# Patient Record
Sex: FEMALE | Race: WHITE | Employment: UNEMPLOYED | URBAN - METROPOLITAN AREA
[De-identification: names, ages, dates, MRNs, and addresses within clinical notes are randomized per-mention and may not be internally consistent; named-entity substitution may affect disease eponyms.]

---

## 2017-01-03 ENCOUNTER — HOSPITAL ENCOUNTER (INPATIENT)
Age: 67
LOS: 9 days | Discharge: NURSING, REHAB OR LONG-TERM CARE | DRG: 483 | End: 2017-01-13
Attending: EMERGENCY MEDICINE | Admitting: HOSPITALIST
Payer: MEDICARE

## 2017-01-03 ENCOUNTER — APPOINTMENT (OUTPATIENT)
Dept: CT IMAGING | Age: 67
DRG: 483 | End: 2017-01-03
Attending: PHYSICIAN ASSISTANT
Payer: MEDICARE

## 2017-01-03 ENCOUNTER — APPOINTMENT (OUTPATIENT)
Dept: GENERAL RADIOLOGY | Age: 67
DRG: 483 | End: 2017-01-03
Attending: PHYSICIAN ASSISTANT
Payer: MEDICARE

## 2017-01-03 ENCOUNTER — APPOINTMENT (OUTPATIENT)
Dept: GENERAL RADIOLOGY | Age: 67
DRG: 483 | End: 2017-01-03
Attending: EMERGENCY MEDICINE
Payer: MEDICARE

## 2017-01-03 ENCOUNTER — HOSPITAL ENCOUNTER (EMERGENCY)
Age: 67
Discharge: HOME | DRG: 483 | End: 2017-01-03
Attending: EMERGENCY MEDICINE
Payer: MEDICARE

## 2017-01-03 VITALS
WEIGHT: 293 LBS | BODY MASS INDEX: 51.91 KG/M2 | HEART RATE: 80 BPM | TEMPERATURE: 98.3 F | DIASTOLIC BLOOD PRESSURE: 79 MMHG | HEIGHT: 63 IN | SYSTOLIC BLOOD PRESSURE: 158 MMHG | OXYGEN SATURATION: 97 % | RESPIRATION RATE: 16 BRPM
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
## 2017-01-03 DIAGNOSIS — S20.212A CONTUSION, CHEST WALL, LEFT, INITIAL ENCOUNTER: ICD-10-CM

## 2017-01-03 DIAGNOSIS — S42.212A CLOSED DISPLACED FRACTURE OF SURGICAL NECK OF LEFT HUMERUS, UNSPECIFIED FRACTURE MORPHOLOGY, INITIAL ENCOUNTER: Primary | ICD-10-CM

## 2017-01-03 DIAGNOSIS — R74.8 ELEVATED LIVER ENZYMES: ICD-10-CM

## 2017-01-03 DIAGNOSIS — N39.0 URINARY TRACT INFECTION, SITE UNSPECIFIED: ICD-10-CM

## 2017-01-03 DIAGNOSIS — S81.811A LEG LACERATION, RIGHT, INITIAL ENCOUNTER: ICD-10-CM

## 2017-01-03 DIAGNOSIS — S42.202D CLOSED FRACTURE OF PROXIMAL END OF LEFT HUMERUS WITH ROUTINE HEALING, UNSPECIFIED FRACTURE MORPHOLOGY, SUBSEQUENT ENCOUNTER: ICD-10-CM

## 2017-01-03 DIAGNOSIS — R29.6 FREQUENT FALLS: ICD-10-CM

## 2017-01-03 LAB
ALBUMIN SERPL BCP-MCNC: 3.3 G/DL (ref 3.4–5)
ALBUMIN/GLOB SERPL: 0.7 {RATIO} (ref 0.8–1.7)
ALP SERPL-CCNC: 164 U/L (ref 45–117)
ALT SERPL-CCNC: 251 U/L (ref 13–56)
ANION GAP BLD CALC-SCNC: 9 MMOL/L (ref 3–18)
APAP SERPL-MCNC: <2 UG/ML (ref 10–30)
APPEARANCE UR: ABNORMAL
AST SERPL W P-5'-P-CCNC: 713 U/L (ref 15–37)
BACTERIA URNS QL MICRO: ABNORMAL /HPF
BASOPHILS # BLD AUTO: 0 K/UL (ref 0–0.06)
BASOPHILS # BLD: 0 % (ref 0–2)
BILIRUB SERPL-MCNC: 0.5 MG/DL (ref 0.2–1)
BILIRUB UR QL: ABNORMAL
BUN SERPL-MCNC: 15 MG/DL (ref 7–18)
BUN/CREAT SERPL: 14 (ref 12–20)
CALCIUM SERPL-MCNC: 9.3 MG/DL (ref 8.5–10.1)
CHLORIDE SERPL-SCNC: 104 MMOL/L (ref 100–108)
CO2 SERPL-SCNC: 29 MMOL/L (ref 21–32)
COLOR UR: ABNORMAL
CREAT SERPL-MCNC: 1.04 MG/DL (ref 0.6–1.3)
DIFFERENTIAL METHOD BLD: ABNORMAL
EOSINOPHIL # BLD: 0 K/UL (ref 0–0.4)
EOSINOPHIL NFR BLD: 0 % (ref 0–5)
EPITH CASTS URNS QL MICRO: ABNORMAL /LPF (ref 0–5)
ERYTHROCYTE [DISTWIDTH] IN BLOOD BY AUTOMATED COUNT: 14 % (ref 11.6–14.5)
GLOBULIN SER CALC-MCNC: 4.7 G/DL (ref 2–4)
GLUCOSE SERPL-MCNC: 144 MG/DL (ref 74–99)
GLUCOSE UR STRIP.AUTO-MCNC: NEGATIVE MG/DL
HCT VFR BLD AUTO: 46.7 % (ref 35–45)
HGB BLD-MCNC: 14.9 G/DL (ref 12–16)
HGB UR QL STRIP: NEGATIVE
KETONES UR QL STRIP.AUTO: NEGATIVE MG/DL
LEUKOCYTE ESTERASE UR QL STRIP.AUTO: ABNORMAL
LYMPHOCYTES # BLD AUTO: 12 % (ref 21–52)
LYMPHOCYTES # BLD: 1.3 K/UL (ref 0.9–3.6)
MCH RBC QN AUTO: 28.7 PG (ref 24–34)
MCHC RBC AUTO-ENTMCNC: 31.9 G/DL (ref 31–37)
MCV RBC AUTO: 90 FL (ref 74–97)
MONOCYTES # BLD: 0.9 K/UL (ref 0.05–1.2)
MONOCYTES NFR BLD AUTO: 8 % (ref 3–10)
NEUTS SEG # BLD: 8.4 K/UL (ref 1.8–8)
NEUTS SEG NFR BLD AUTO: 80 % (ref 40–73)
NITRITE UR QL STRIP.AUTO: POSITIVE
PH UR STRIP: 5 [PH] (ref 5–8)
PLATELET # BLD AUTO: 241 K/UL (ref 135–420)
PMV BLD AUTO: 11.6 FL (ref 9.2–11.8)
POTASSIUM SERPL-SCNC: 3.8 MMOL/L (ref 3.5–5.5)
PROT SERPL-MCNC: 8 G/DL (ref 6.4–8.2)
PROT UR STRIP-MCNC: 30 MG/DL
RBC # BLD AUTO: 5.19 M/UL (ref 4.2–5.3)
SODIUM SERPL-SCNC: 142 MMOL/L (ref 136–145)
SP GR UR REFRACTOMETRY: >1.03 (ref 1–1.03)
UROBILINOGEN UR QL STRIP.AUTO: 1 EU/DL (ref 0.2–1)
WBC # BLD AUTO: 10.5 K/UL (ref 4.6–13.2)
WBC URNS QL MICRO: ABNORMAL /HPF (ref 0–5)

## 2017-01-03 PROCEDURE — 81001 URINALYSIS AUTO W/SCOPE: CPT | Performed by: PHYSICIAN ASSISTANT

## 2017-01-03 PROCEDURE — 96375 TX/PRO/DX INJ NEW DRUG ADDON: CPT

## 2017-01-03 PROCEDURE — 75810000293 HC SIMP/SUPERF WND  RPR

## 2017-01-03 PROCEDURE — 77030018836 HC SOL IRR NACL ICUM -A

## 2017-01-03 PROCEDURE — 71010 XR CHEST PORT: CPT

## 2017-01-03 PROCEDURE — 85025 COMPLETE CBC W/AUTO DIFF WBC: CPT | Performed by: PHYSICIAN ASSISTANT

## 2017-01-03 PROCEDURE — 77030034850

## 2017-01-03 PROCEDURE — 70450 CT HEAD/BRAIN W/O DYE: CPT

## 2017-01-03 PROCEDURE — 74011250636 HC RX REV CODE- 250/636: Performed by: PHYSICIAN ASSISTANT

## 2017-01-03 PROCEDURE — 94640 AIRWAY INHALATION TREATMENT: CPT

## 2017-01-03 PROCEDURE — 73590 X-RAY EXAM OF LOWER LEG: CPT

## 2017-01-03 PROCEDURE — 51702 INSERT TEMP BLADDER CATH: CPT

## 2017-01-03 PROCEDURE — 0HQKXZZ REPAIR RIGHT LOWER LEG SKIN, EXTERNAL APPROACH: ICD-10-PCS | Performed by: EMERGENCY MEDICINE

## 2017-01-03 PROCEDURE — 80307 DRUG TEST PRSMV CHEM ANLYZR: CPT | Performed by: PHYSICIAN ASSISTANT

## 2017-01-03 PROCEDURE — 96374 THER/PROPH/DIAG INJ IV PUSH: CPT

## 2017-01-03 PROCEDURE — 72125 CT NECK SPINE W/O DYE: CPT

## 2017-01-03 PROCEDURE — 74011000250 HC RX REV CODE- 250: Performed by: PHYSICIAN ASSISTANT

## 2017-01-03 PROCEDURE — 77030002986 HC SUT PROL J&J -A

## 2017-01-03 PROCEDURE — 74011250637 HC RX REV CODE- 250/637: Performed by: PHYSICIAN ASSISTANT

## 2017-01-03 PROCEDURE — 80053 COMPREHEN METABOLIC PANEL: CPT | Performed by: PHYSICIAN ASSISTANT

## 2017-01-03 PROCEDURE — 99285 EMERGENCY DEPT VISIT HI MDM: CPT

## 2017-01-03 RX ORDER — HYDROCODONE BITARTRATE AND ACETAMINOPHEN 7.5; 325 MG/1; MG/1
1 TABLET ORAL
Status: DISCONTINUED | OUTPATIENT
Start: 2017-01-03 | End: 2017-01-13 | Stop reason: HOSPADM

## 2017-01-03 RX ORDER — NITROFURANTOIN 25; 75 MG/1; MG/1
100 CAPSULE ORAL
Status: COMPLETED | OUTPATIENT
Start: 2017-01-03 | End: 2017-01-03

## 2017-01-03 RX ORDER — ONDANSETRON 2 MG/ML
4 INJECTION INTRAMUSCULAR; INTRAVENOUS
Status: COMPLETED | OUTPATIENT
Start: 2017-01-03 | End: 2017-01-03

## 2017-01-03 RX ORDER — HYDROMORPHONE HYDROCHLORIDE 2 MG/ML
2 INJECTION, SOLUTION INTRAMUSCULAR; INTRAVENOUS; SUBCUTANEOUS ONCE
Status: COMPLETED | OUTPATIENT
Start: 2017-01-03 | End: 2017-01-03

## 2017-01-03 RX ORDER — TOLTERODINE TARTRATE 2 MG/1
2 TABLET, EXTENDED RELEASE ORAL 2 TIMES DAILY
Status: DISCONTINUED | OUTPATIENT
Start: 2017-01-04 | End: 2017-01-13 | Stop reason: HOSPADM

## 2017-01-03 RX ORDER — KETOROLAC TROMETHAMINE 30 MG/ML
60 INJECTION, SOLUTION INTRAMUSCULAR; INTRAVENOUS
Status: DISCONTINUED | OUTPATIENT
Start: 2017-01-03 | End: 2017-01-03

## 2017-01-03 RX ORDER — ONDANSETRON 4 MG/1
4 TABLET, ORALLY DISINTEGRATING ORAL
Status: COMPLETED | OUTPATIENT
Start: 2017-01-03 | End: 2017-01-03

## 2017-01-03 RX ORDER — HYDROCODONE BITARTRATE AND ACETAMINOPHEN 7.5; 325 MG/1; MG/1
1 TABLET ORAL
Qty: 20 TAB | Refills: 0 | Status: ON HOLD | OUTPATIENT
Start: 2017-01-03 | End: 2017-01-12

## 2017-01-03 RX ORDER — GABAPENTIN 100 MG/1
100 CAPSULE ORAL 3 TIMES DAILY
Status: DISCONTINUED | OUTPATIENT
Start: 2017-01-04 | End: 2017-01-04

## 2017-01-03 RX ORDER — IPRATROPIUM BROMIDE AND ALBUTEROL SULFATE 2.5; .5 MG/3ML; MG/3ML
3 SOLUTION RESPIRATORY (INHALATION)
Status: DISCONTINUED | OUTPATIENT
Start: 2017-01-03 | End: 2017-01-13 | Stop reason: HOSPADM

## 2017-01-03 RX ORDER — ENOXAPARIN SODIUM 100 MG/ML
60 INJECTION SUBCUTANEOUS DAILY
Status: DISCONTINUED | OUTPATIENT
Start: 2017-01-04 | End: 2017-01-04

## 2017-01-03 RX ORDER — HYDROMORPHONE HYDROCHLORIDE 1 MG/ML
1 INJECTION, SOLUTION INTRAMUSCULAR; INTRAVENOUS; SUBCUTANEOUS
Status: DISCONTINUED | OUTPATIENT
Start: 2017-01-03 | End: 2017-01-03

## 2017-01-03 RX ORDER — HYDROMORPHONE HYDROCHLORIDE 1 MG/ML
0.5 INJECTION, SOLUTION INTRAMUSCULAR; INTRAVENOUS; SUBCUTANEOUS
Status: DISCONTINUED | OUTPATIENT
Start: 2017-01-03 | End: 2017-01-10

## 2017-01-03 RX ORDER — HYDROMORPHONE HYDROCHLORIDE 1 MG/ML
1 INJECTION, SOLUTION INTRAMUSCULAR; INTRAVENOUS; SUBCUTANEOUS
Status: COMPLETED | OUTPATIENT
Start: 2017-01-03 | End: 2017-01-03

## 2017-01-03 RX ORDER — LORATADINE 10 MG/1
10 TABLET ORAL DAILY
Status: DISCONTINUED | OUTPATIENT
Start: 2017-01-04 | End: 2017-01-13 | Stop reason: HOSPADM

## 2017-01-03 RX ORDER — FLUTICASONE PROPIONATE AND SALMETEROL 250; 50 UG/1; UG/1
1 POWDER RESPIRATORY (INHALATION) 2 TIMES DAILY
Status: DISCONTINUED | OUTPATIENT
Start: 2017-01-04 | End: 2017-01-13 | Stop reason: HOSPADM

## 2017-01-03 RX ORDER — KETOROLAC TROMETHAMINE 30 MG/ML
30 INJECTION, SOLUTION INTRAMUSCULAR; INTRAVENOUS
Status: COMPLETED | OUTPATIENT
Start: 2017-01-03 | End: 2017-01-03

## 2017-01-03 RX ORDER — FLUTICASONE PROPIONATE 50 MCG
2 SPRAY, SUSPENSION (ML) NASAL DAILY
Status: DISCONTINUED | OUTPATIENT
Start: 2017-01-04 | End: 2017-01-13 | Stop reason: HOSPADM

## 2017-01-03 RX ORDER — PRAVASTATIN SODIUM 20 MG/1
40 TABLET ORAL
Status: DISCONTINUED | OUTPATIENT
Start: 2017-01-03 | End: 2017-01-04

## 2017-01-03 RX ORDER — LEVOFLOXACIN 5 MG/ML
500 INJECTION, SOLUTION INTRAVENOUS EVERY 24 HOURS
Status: DISCONTINUED | OUTPATIENT
Start: 2017-01-04 | End: 2017-01-11

## 2017-01-03 RX ORDER — ROPINIROLE 1 MG/1
2 TABLET, FILM COATED ORAL 3 TIMES DAILY
Status: DISCONTINUED | OUTPATIENT
Start: 2017-01-04 | End: 2017-01-04

## 2017-01-03 RX ORDER — IPRATROPIUM BROMIDE AND ALBUTEROL SULFATE 2.5; .5 MG/3ML; MG/3ML
3 SOLUTION RESPIRATORY (INHALATION)
Status: COMPLETED | OUTPATIENT
Start: 2017-01-03 | End: 2017-01-03

## 2017-01-03 RX ADMIN — KETOROLAC TROMETHAMINE 30 MG: 30 INJECTION, SOLUTION INTRAMUSCULAR at 07:20

## 2017-01-03 RX ADMIN — LIDOCAINE HYDROCHLORIDE 15 MG: 10; .005 INJECTION, SOLUTION EPIDURAL; INFILTRATION; INTRACAUDAL; PERINEURAL at 18:37

## 2017-01-03 RX ADMIN — HYDROMORPHONE HYDROCHLORIDE 2 MG: 2 INJECTION INTRAMUSCULAR; INTRAVENOUS; SUBCUTANEOUS at 09:36

## 2017-01-03 RX ADMIN — NITROFURANTOIN (MONOHYDRATE/MACROCRYSTALS) 100 MG: 75; 25 CAPSULE ORAL at 23:22

## 2017-01-03 RX ADMIN — IPRATROPIUM BROMIDE AND ALBUTEROL SULFATE 3 ML: .5; 3 SOLUTION RESPIRATORY (INHALATION) at 18:37

## 2017-01-03 RX ADMIN — ONDANSETRON 4 MG: 2 INJECTION INTRAMUSCULAR; INTRAVENOUS at 22:04

## 2017-01-03 RX ADMIN — ONDANSETRON 4 MG: 4 TABLET, ORALLY DISINTEGRATING ORAL at 06:55

## 2017-01-03 RX ADMIN — HYDROMORPHONE HYDROCHLORIDE 1 MG: 1 INJECTION, SOLUTION INTRAMUSCULAR; INTRAVENOUS; SUBCUTANEOUS at 22:04

## 2017-01-03 NOTE — ED NOTES
Fernie molina edt to apply shoulder immobilizer once pain medications has helped to decrease the pain

## 2017-01-03 NOTE — ED TRIAGE NOTES
Triage note: pt arrives via ems status post her second fall today. Per ems pt was in her motorized wheel chair trying to get something out of the refrigerator and she hit her breasts on the eliza stick and she fell to the right out of her wheel chair. Pt has a deep laceration to her right posterior shin. Pt has a shoulder immobilizer that she arrived with from her first visit today due to a left humerus fx. Pt is alert and oriented. Sepsis Screening completed    (  )Patient meets SIRS criteria. ( x )Patient does not meet SIRS criteria.       SIRS Criteria is achieved when two or more of the following are present   Temperature < 96.8°F (36°C) or > 100.9°F (38.3°C)   Heart Rate > 90 beats per minute   Respiratory Rate > 20 beats per minute   WBC count > 12,000 or <4,000 or > 10% bands

## 2017-01-03 NOTE — ED PROVIDER NOTES
HPI Comments:   5:30 PM   Veronica Moscoso is a 61 y.o. Female who is non-ambulatory presenting to the ED C/O fall from motorized wheelchair while trying to close refrigerator door PTA. Pt states her breast hit her joystick which moved the chair and caused it to jerk and pt fell onto her right side. Per EMS pt has a lac on right lower leg. Pt reports she is unsure what caused the lac but it could be form hitting leg against fridge when she fell. Pt denies pain in legs. However, pt reports 12/10 left arm pain. Pt was seen at the facility earlier today for another fall which caused a left broken humerus. Per EMS pt took pain medication an hour ago. Pt is on lovenox. PMHx include asthma. Pt reports she has had a cough lately. Pt denies LOC, hit to head, neck or back, back pain, HA, dizziness, blurred vision, slurred speech, CP, SOB, leg pain, UTD tetanus vaccine and any other symptoms or complaints. Written by NEWTON Velazco, as dictated by Jamar Hernadez PA-C      Patient is a 61 y.o. female presenting with fall. The history is provided by the patient. No  was used. Fall   The accident occurred less than 1 hour ago. She fell from a height of 1 - 2 ft. She landed on hard floor. She was not ambulatory at the scene. Pertinent negatives include no headaches.         Past Medical History:   Diagnosis Date    Active rheumatic fever with cardiac involvement     Anemia     Asthma     Chronic back pain     DDD (degenerative disc disease), cervical     Depression     Diabetes (HCC)     D[facility]deep vein thrombosis) in [facility] ([facility] [address]     Edema     Factor V Leiden mutation ([facility] [address]     Fatty liver disease, non-alcoholic 8/76/9933    GERD (gastroesophageal reflux disease)     Heart abnormality     HNP (herniated nucleus pulposus)     Hypercholesterolemia     Knee pain     Lymphedema     Osteopenia     RLS (restless legs syndrome)     Spinal stenosis     Uterine endometrial cancer, sarcoma [facility])        Past Surgical History:   Procedure Laterality Date    Hx gastric bypass       In [facility] many years ago.  Hx darryl and bso      Hx carpal tunnel release      Hx gastric bypass      Pr lap,cholecystectomy           History reviewed. No pertinent family history. Social History     Social History    Marital status: SINGLE     Spouse name: N/A    Number of children: N/A    Years of education: N/A     Occupational History    Not on file. Social History Main Topics    Smoking status: Never Smoker    Smokeless tobacco: Never Used    Alcohol use No    Drug use: No    Sexual activity: Not on file     Other Topics Concern    Not on file     Social History Narrative         ALLERGIES: Augmentin [amoxicillin-pot clavulanate]; Codeine; and Lodine [etodolac]    Review of Systems   Eyes: Negative for visual disturbance. Respiratory: Negative for shortness of breath. Cardiovascular: Negative for chest pain. Musculoskeletal: Positive for arthralgias. Negative for back pain and neck pain. Skin: Positive for wound. Neurological: Negative for dizziness, syncope, speech difficulty and headaches. All other systems reviewed and are negative. Vitals:    01/03/17 2045 01/03/17 2200 01/03/17 2300 01/04/17 0013   BP: 146/77 148/67 122/57 126/61   Pulse:  99 84 (!) 101   Resp:  17 17 17   Temp:  98.5 °F (36.9 °C) 98.8 °F (37.1 °C) 98.3 °F (36.8 °C)   SpO2: 91% 95% 97% 95%   Weight:       Height:                Physical Exam   Constitutional: She is oriented to person, place, and time. She appears well-developed and well-nourished. No distress. Exam limited secondary to body habitus, limited mobility and known left humerus fracture, lying on stretcher no resp distress, chronically ill appearing, alert, oriented x4    HENT:   Head: Normocephalic and atraumatic. Neck: Normal range of motion. Neck supple.    Non tender, no crepitus or step off, FROM      Cardiovascular: Normal rate, regular rhythm, normal heart sounds and intact distal pulses. No murmur heard. Pulmonary/Chest: Effort normal and breath sounds normal. No respiratory distress. She has no wheezes. She has no rales. She exhibits no tenderness. Coarse lung sounds bilaterally   Good air movement    Abdominal: Soft. Bowel sounds are normal. She exhibits no distension. There is no tenderness. There is no rebound and no guarding. Musculoskeletal:   RUE, BLE non tender  Left arm in shoulder immobilizer and sling   Limited mobility of lower extremities      Neurological: She is alert and oriented to person, place, and time. Skin:   Bruising of the BUE and BLE, various stages of healing  6 cm linear laceration to the anterior right shin, no active bleeding, well approximated    Psychiatric: She has a normal mood and affect. Judgment normal.   Nursing note and vitals reviewed. RESULTS:    9:47 PM  RADIOLOGY FINDINGS  tib/fib X-ray shows NAP  Pending review by Radiologist  Recorded by Selam Yusuf ED Scribe, as dictated by Don Fraser PA-C      CT SPINE CERV WO CONT   Final Result   1. No acute bony abnormality.     2. Multilevel spondylosis.     Please note that this CT was performed supine. It is highly sensitive for  fractures and dislocations but does not evaluate for ligamentous injury  including significant instability. If the patient's symptoms persist or if  otherwise clinically indicated, an erect plain film evaluation of the cervical  spine is suggested.   As read by the radiologist.    CT HEAD WO CONT   Final Result   1. No acute intracranial abnormalities.     2. Pansinus disease. As read by the radiologist.    XR CHEST PORT   Final Result   Bilateral subsegmental atelectasis or scarring. Otherwise, no focal  consolidation to suggest pneumonia.   As read by the radiologist.    XR TIB/FIB RT    (Results Pending)        Labs Reviewed   CBC WITH AUTOMATED DIFF - Abnormal; Notable for the following:        Result Value    HCT 46.7 (*)     NEUTROPHILS 80 (*)     LYMPHOCYTES 12 (*)     ABS. NEUTROPHILS 8.4 (*)     All other components within normal limits   METABOLIC PANEL, COMPREHENSIVE - Abnormal; Notable for the following:     Glucose 144 (*)     GFR est non-AA 53 (*)      (*)      (*)     Alk. phosphatase 164 (*)     Albumin 3.3 (*)     Globulin 4.7 (*)     A-G Ratio 0.7 (*)     All other components within normal limits   URINALYSIS W/ RFLX MICROSCOPIC - Abnormal; Notable for the following:     Specific gravity >1.030 (*)     Protein 30 (*)     Bilirubin MODERATE (*)     Nitrites POSITIVE (*)     Leukocyte Esterase TRACE (*)     All other components within normal limits   ACETAMINOPHEN - Abnormal; Notable for the following:     ACETAMINOPHEN <2 (*)     All other components within normal limits   URINE MICROSCOPIC ONLY - Abnormal; Notable for the following:     Bacteria 4+ (*)     All other components within normal limits   GLUCOSE, POC - Abnormal; Notable for the following:     Glucose (POC) 111 (*)     All other components within normal limits   CULTURE, URINE   CBC WITH AUTOMATED DIFF   METABOLIC PANEL, COMPREHENSIVE   HEMOGLOBIN A1C WITH EAG   HEPATITIS PANEL, ACUTE   POC GLUCOSE       Recent Results (from the past 12 hour(s))   CBC WITH AUTOMATED DIFF    Collection Time: 01/03/17  6:45 PM   Result Value Ref Range    WBC 10.5 4.6 - 13.2 K/uL    RBC 5.19 4.20 - 5.30 M/uL    HGB 14.9 12.0 - 16.0 g/dL    HCT 46.7 (H) 35.0 - 45.0 %    MCV 90.0 74.0 - 97.0 FL    MCH 28.7 24.0 - 34.0 PG    MCHC 31.9 31.0 - 37.0 g/dL    RDW 14.0 11.6 - 14.5 %    PLATELET 153 105 - 716 K/uL    MPV 11.6 9.2 - 11.8 FL    NEUTROPHILS 80 (H) 40 - 73 %    LYMPHOCYTES 12 (L) 21 - 52 %    MONOCYTES 8 3 - 10 %    EOSINOPHILS 0 0 - 5 %    BASOPHILS 0 0 - 2 %    ABS. NEUTROPHILS 8.4 (H) 1.8 - 8.0 K/UL    ABS. LYMPHOCYTES 1.3 0.9 - 3.6 K/UL    ABS. MONOCYTES 0.9 0.05 - 1.2 K/UL    ABS. EOSINOPHILS 0.0 0.0 - 0.4 K/UL    ABS.  BASOPHILS 0.0 0.0 - 0.06 K/UL    DF AUTOMATED     METABOLIC PANEL, COMPREHENSIVE    Collection Time: 01/03/17  6:45 PM   Result Value Ref Range    Sodium 142 136 - 145 mmol/L    Potassium 3.8 3.5 - 5.5 mmol/L    Chloride 104 100 - 108 mmol/L    CO2 29 21 - 32 mmol/L    Anion gap 9 3.0 - 18 mmol/L    Glucose 144 (H) 74 - 99 mg/dL    BUN 15 7.0 - 18 MG/DL    Creatinine 1.04 0.6 - 1.3 MG/DL    BUN/Creatinine ratio 14 12 - 20      GFR est AA >60 >60 ml/min/1.73m2    GFR est non-AA 53 (L) >60 ml/min/1.73m2    Calcium 9.3 8.5 - 10.1 MG/DL    Bilirubin, total 0.5 0.2 - 1.0 MG/DL     (H) 13 - 56 U/L     (H) 15 - 37 U/L    Alk.  phosphatase 164 (H) 45 - 117 U/L    Protein, total 8.0 6.4 - 8.2 g/dL    Albumin 3.3 (L) 3.4 - 5.0 g/dL    Globulin 4.7 (H) 2.0 - 4.0 g/dL    A-G Ratio 0.7 (L) 0.8 - 1.7     ACETAMINOPHEN    Collection Time: 01/03/17  6:45 PM   Result Value Ref Range    ACETAMINOPHEN <2 (L) 10 - 30 ug/mL   URINALYSIS W/ RFLX MICROSCOPIC    Collection Time: 01/03/17 10:20 PM   Result Value Ref Range    Color DARK YELLOW      Appearance CLOUDY      Specific gravity >1.030 (H) 1.005 - 1.030    pH (UA) 5.0 5.0 - 8.0      Protein 30 (A) NEG mg/dL    Glucose NEGATIVE  NEG mg/dL    Ketone NEGATIVE  NEG mg/dL    Bilirubin MODERATE (A) NEG      Blood NEGATIVE  NEG      Urobilinogen 1.0 0.2 - 1.0 EU/dL    Nitrites POSITIVE (A) NEG      Leukocyte Esterase TRACE (A) NEG     URINE MICROSCOPIC ONLY    Collection Time: 01/03/17 10:20 PM   Result Value Ref Range    WBC 0 to 2 0 - 5 /hpf    Epithelial cells FEW 0 - 5 /lpf    Bacteria 4+ (A) NEG /hpf   GLUCOSE, POC    Collection Time: 01/04/17 12:24 AM   Result Value Ref Range    Glucose (POC) 111 (H) 70 - 110 mg/dL        MDM  Number of Diagnoses or Management Options  Closed fracture of proximal end of left humerus with routine healing, unspecified fracture morphology, subsequent encounter:   Elevated liver enzymes:   Fall, initial encounter:   Frequent falls:   Leg laceration, right, initial encounter:   Urinary tract infection, site unspecified:   Diagnosis management comments: Frequent falls, head injury, intracranial bleed, skull fracture, tib/fib fx, FB, laceration in need of repair, will update tetanus        Amount and/or Complexity of Data Reviewed  Clinical lab tests: ordered and reviewed  Tests in the radiology section of CPT®: ordered and reviewed (CXR, tib/tib x-ray, head CT, cervical spine CT)  Discuss the patient with other providers: yes Gopi Morataya MD (hospitalist))  Independent visualization of images, tracings, or specimens: yes (CXR, tib/tib x-ray)      ED Course     MEDICATIONS GIVEN:  Medications   diph,Pertuss(AC),Tet Vac-PF (BOOSTRIX) suspension 0.5 mL (0.5 mL IntraMUSCular Refused 1/4/17 0117)   HYDROcodone-acetaminophen (Thuan Herrlich) 7.5-325 mg per tablet 1 Tab (not administered)   fluticasone-salmeterol (ADVAIR) 250mcg-50mcg/puff (not administered)   fluticasone (FLONASE) 50 mcg/actuation nasal spray 2 Spray (not administered)   pravastatin (PRAVACHOL) tablet 40 mg (40 mg Oral Given 1/4/17 0115)   enoxaparin (LOVENOX) injection 60 mg (not administered)   tolterodine (DETROL) tablet 2 mg (not administered)   loratadine (CLARITIN) tablet 10 mg (not administered)   HYDROmorphone (PF) (DILAUDID) injection 0.5 mg (0.5 mg IntraVENous Given 1/4/17 0224)   albuterol-ipratropium (DUO-NEB) 2.5 MG-0.5 MG/3 ML (not administered)   levoFLOXacin (LEVAQUIN) 500 mg in D5W IVPB (500 mg IntraVENous New Bag 1/4/17 0112)   insulin lispro (HUMALOG) injection (not administered)   glucose chewable tablet 16 g (not administered)   glucagon (GLUCAGEN) injection 1 mg (not administered)   dextrose (D50W) injection syrg 12.5-25 g (not administered)   gabapentin (NEURONTIN) capsule 300 mg (not administered)   gabapentin (NEURONTIN) capsule 600 mg (not administered)   rOPINIRole (REQUIP) tablet 1 mg (not administered)   rOPINIRole (REQUIP) tablet 2 mg (not administered)   traZODone (DESYREL) tablet 100 mg (not administered)   lidocaine-EPINEPHrine (PF) (XYLOCAINE) 1 %-1:200,000 injection 15 mg (15 mg SubCUTAneous Given by Provider 1/3/17 1837)   albuterol-ipratropium (DUO-NEB) 2.5 MG-0.5 MG/3 ML (3 mL Nebulization Given 1/3/17 1837)   HYDROmorphone (PF) (DILAUDID) injection 1 mg (1 mg IntraVENous Given 1/3/17 2204)   ondansetron (ZOFRAN) injection 4 mg (4 mg IntraVENous Given 1/3/17 2204)   nitrofurantoin (macrocrystal-monohydrate) (MACROBID) capsule 100 mg (100 mg Oral Given 1/3/17 2322)        Wound Repair  Date/Time: 1/3/2017 10:17 PM  Performed by: PAPreparation: skin prepped with Shur-Clens  Pre-procedure re-eval: Immediately prior to the procedure, the patient was reevaluated and found suitable for the planned procedure and any planned medications. Time out: Immediately prior to the procedure a time out was called to verify the correct patient, procedure, equipment, staff and marking as appropriate. .  Location details: right leg  Wound length:2.6 - 7.5 cm    Anesthesia:  Local Anesthetic: lidocaine 1% with epinephrine   Anesthetic total: 10 mL  Foreign bodies: no foreign bodies  Irrigation solution: saline  Irrigation method: syringe  Debridement: none  Skin closure: Prolene and 4-0 nylon  Number of sutures: 5  Technique: simple and interrupted  Approximation: close  Dressing: 4x4  Patient tolerance: Patient tolerated the procedure well with no immediate complications  My total time at bedside, performing this procedure was 1-15 minutes. PROGRESS NOTE:  5:30 PM  Initial assessment performed. She was 97% on 2 liters earlier today when she was first seen, now she is on 90-93% on RA. She does have some course lung sound and reports a cough recently, will check CXR. Written by NEWTON Cruz, as dictated by Oliver Luciano PA-C     PROGRESS NOTE:   7:35 PM  Pt has been re-examined by Oliver Luciano PA-C .  She went to 95% on RA after neb treatment, but then came back to 89% so pt was placed on 2 liters O2  Written by NEWTON Bowie, as dictated by Argentina Olsen PA-C .     CONSULT NOTE:   8:23 PM  Argentina Olsen PA-C  spoke with Jeane Francisco MD    Specialty: hospitalist   Discussed pt's hx, disposition, and available diagnostic and imaging results over the telephone. Reviewed care plans. Consulting physician agrees with plans as outlined. Pt will be admitted to obs. .   Written by NEWTON Bowie, as dictated by Argentina Olsen PA-C .      ADMISSION NOTE:  8:24 PM  Patient is being admitted to the hospital by Jeane Francisco MD . The results of their tests and reasons for their admission have been discussed with them and/or available family. They convey agreement and understanding for the need to be admitted and for their admission diagnosis. Written by NEWTON Bowie, as dictated by Argentina Olsen PA-C .     CONDITIONS ON ADMISSION:  8:24 PM   Deep Vein Thrombosis is not present at the time of admission. Thrombosis is not present at the time of admission. Urinary Tract Infection is present at the time of admission. Pneumonia is not present at the time of admission. MRSA is not present at the time of admission. Wound infection is not present at the time of admission. Pressure Ulcer is not present at the time of admission. Written by NEWTON Bowie, as dictated by Argentina Olsen PA-C . PROGRESS NOTE:   9:45 PM  Pt has been re-examined by Argentina Olsen PA-C . She states she has had Dilaudid before and tolerated it. Written by NEWTON Bowie, as dictated by Argentina Olsen PA-C .     CLINICAL IMPRESSION:    1. Fall, initial encounter    2. Frequent falls    3. Closed fracture of proximal end of left humerus with routine healing, unspecified fracture morphology, subsequent encounter    4. Elevated liver enzymes    5. Leg laceration, right, initial encounter    6.  Urinary tract infection, site unspecified        ATTESTATIONS:  This note is prepared by Brooke Romeo, acting as Scribe for Antonia Enriquez PA-C . Antonia Enriquez PA-C : The scribe's documentation has been prepared under my direction and personally reviewed by me in its entirety. I confirm that the note above accurately reflects all work, treatment, procedures, and medical decision making performed by me.

## 2017-01-03 NOTE — ED NOTES
Patient state she does not know how she is going to take care of herself. Patient reports she lives alone and has no one to help her. Consulted with Dr. Dany Ramos about this patient. He will ask case management to come see patient. Patient states the pain is a little better. States she does not want the immobilizer on yet.

## 2017-01-03 NOTE — ED PROVIDER NOTES
HPI Comments: 4:39 AM   Melani Castaneda is a 61 y.o. female presenting to the ED C/O left arm pain (9/10) s/p tripping and falling on the wheel of her hospital bed onset about an hour ago. Pt denies LOC but does admit to have rib pain. PMHx of fatty liver sidease, DM, Factor V Leiden mutation, GERD, depression, asthma, RLS, active rheumatic fever, chronic back pain, edema, osteopenia, lymphedema, Uterine endmetrial cancer, HNP,anemia, DDD, spinal stenosis and DVT. Pt denies  any other Sx or complaints. Patient is a 61 y.o. female presenting with fall. The history is provided by the patient. No  was used. Fall   The accident occurred 1 to 2 hours ago. The fall occurred while walking (Tripped over medical bed). She landed on hard floor. Point of impact: Left arm. Pain location: Left arm. The pain is at a severity of 9/10. Pertinent negatives include no loss of consciousness. Written by NEWTON Vinson, as dictated by David Lee MD    Past Medical History:   Diagnosis Date    Active rheumatic fever with cardiac involvement     Anemia     Asthma     Chronic back pain     DDD (degenerative disc disease), cervical     Depression     Diabetes ([address]     DVT (deep vein thrombosis) in pregnancy ([address]     Edema     Factor V Leiden mutation ([address]     Fatty liver disease, non-alcoholic 0/56/1829    GERD (gastroesophageal reflux disease)     Heart abnormality     HNP (herniated nucleus pulposus)     Hypercholesterolemia     Knee pain     Lymphedema     Osteopenia     RLS (restless legs syndrome)     Spinal stenosis     Uterine endometrial cancer, sarcoma (HCC)        Past Surgical History:   Procedure Laterality Date    Hx gastric bypass       In [facility] many years ago.  Hx darryl and bso      Hx carpal tunnel release      Hx gastric bypass      Pr lap,cholecystectomy           History reviewed. No pertinent family history.     Social History     Social History    Marital status: SINGLE     Spouse name: N/A    Number of children: N/A    Years of education: N/A     Occupational History    Not on file. Social History Main Topics    Smoking status: Never Smoker    Smokeless tobacco: Never Used    Alcohol use No    Drug use: No    Sexual activity: Not on file     Other Topics Concern    Not on file     Social History Narrative         ALLERGIES: Augmentin [amoxicillin-pot clavulanate]; Codeine; and Lodine [etodolac]    Review of Systems   Musculoskeletal: Positive for arthralgias (Left arm pain, Rib pain) and myalgias (Left arm pain, Rib pain). Neurological: Negative for loss of consciousness and syncope. All other systems reviewed and are negative. Vitals:    01/03/17 0434 01/03/17 0441 01/03/17 0500   BP: 148/84  142/64   Pulse: 91     Resp: 20     Temp: 98.1 °F (36.7 °C)     SpO2: 90% 95% 100%   Weight: 140.6 kg (310 lb)     Height: 5' 3\" (1.6 m)              Physical Exam   Constitutional: She is oriented to person, place, and time. She appears well-developed. Morbidly obese female in no acute distress, unkempt    HENT:   Head: Normocephalic and atraumatic. Eyes: Pupils are equal, round, and reactive to light. Neck: Neck supple. Cardiovascular: Normal rate, regular rhythm, S1 normal, S2 normal and normal heart sounds. Pulmonary/Chest: Breath sounds normal. No respiratory distress. She has no wheezes. She has no rales. She exhibits tenderness (Tender to left lateral chest area). She exhibits no crepitus. No bruising   Abdominal: Soft. She exhibits no mass. There is no tenderness. There is no guarding. Large pannus, Obese. Musculoskeletal: She exhibits no edema. Left shoulder: She exhibits decreased range of motion (Due to pain) and tenderness. Keeps shoulder and elbow in position of comfort. Trace edema both legs   Neurological: She is alert and oriented to person, place, and time. No cranial nerve deficit.    Skin: No rash noted. Psychiatric: She has a normal mood and affect. Her behavior is normal. Thought content normal.   Nursing note and vitals reviewed. RESULTS:  X-RAY FINDINGS:  6:41 AM  Left shoulder x-ray shows   left humerus neck fracture  Pending review by Radiologist  Recorded by Regan Eisenmenger, ED Scribe, as dictated by Rosemarie Sher MD     X-RAY FINDINGS:  6:41 AM  Left shoulder x-ray shows humeral displacement  Pending review by Radiologist  Recorded by Regan Eisenmenger, ED Scribe, as dictated by Rosemarie Sher MD     X-RAY FINDINGS:  6:41 AM  Left rib x-ray shows  No acute process  Pending review by Radiologist  Recorded by Regan Eisenmenger, ED Scribe, as dictated by Rosemarie Sher MD   XR SHOULDER LT AP/LAT MIN 2 V    (Results Pending)   XR HUMERUS LT    (Results Pending)   XR RIBS LT W PA CXR MIN 3 V    (Results Pending)        Labs Reviewed - No data to display    No results found for this or any previous visit (from the past 12 hour(s)). MDM  Number of Diagnoses or Management Options  Closed displaced fracture of surgical neck of left humerus, unspecified fracture morphology, initial encounter:   Contusion, chest wall, left, initial encounter:      Amount and/or Complexity of Data Reviewed  Tests in the radiology section of CPT®: reviewed and ordered (Left shoulder X-ray, Left humerus x-ray, Left rib x-ray)  Discuss the patient with other providers: yes Jose Cazares MD (Orthopedics))  Independent visualization of images, tracings, or specimens: yes (Left shoulder X-ray, Left humerus x-ray, Left rib x-ray)      ED Course     Medications   ondansetron (ZOFRAN ODT) tablet 4 mg (4 mg SubLINGual Given 1/3/17 2255)   ketorolac (TORADOL) injection 30 mg (30 mg IntraVENous Given 1/3/17 0720)      Procedures  PROGRESS NOTE:  4:39 AM  Initial assessment performed.   Written by Regan Eisenmenger, ED Scribe, as dictated by Rosemarie Sher MD     CONSULT NOTE:   7:53 AM  Rosemarie Sher MD spoke with Ford Edwards MD   Specialty: Orthopedics   Discussed pt's hx, disposition, and available diagnostic and imaging results over the telephone. Reviewed care plans. Consulting physician agrees with plans as outlined. Will follow up with pt. Written by Dari Bridges ED Scribe, as dictated by Maureen Moser MD.    BEDSIDE TRANSFER OF CARE:  7:54 AM  Patient care will be transferred from Maureen Moser MD to Maurice Duncan MD.  Discussed available diagnostic results and care plan at length at beside. Patient and family made aware of provider change. All questions answered. Patient and family voiced understanding. Written by Dari Bridges ED Scribe, as dictated by [employer], MD.     Procedure Note - Splint Assessment:  8:23 AM  Performed by: [employer], MD  Splint to patient's left shoulder - was placed by  Nurse and evaluated by [employer], MD. Splint in good position. N/V intact after treatment. The procedure took 1-15 minutes, and patient tolerated well. Written by Lauren Kim ED Scribe, as dictated by Maureen Moser MD.    DISCHARGE NOTE:  8:24 AM   Behzad Ordoñez  results have been reviewed with her. She has been counseled regarding her diagnosis, treatment, and plan. She verbally conveys understanding and agreement of the signs, symptoms, diagnosis, treatment and prognosis and additionally agrees to follow up as discussed. She also agrees with the care-plan and conveys that all of her questions have been answered. I have also provided discharge instructions for her that include: educational information regarding their diagnosis and treatment, and list of reasons why they would want to return to the ED prior to their follow-up appointment, should her condition change. The patient and/or family has been provided with education for proper Emergency Department utilization. CLINICAL IMPRESSION:    1.  Closed displaced fracture of surgical neck of left humerus, unspecified fracture morphology, initial encounter    2. Contusion, chest wall, left, initial encounter        PLAN: DISCHARGE HOME    Follow-up Information     Follow up With Details Comments Contact Info    Jonah Kern MD Schedule an appointment as soon as possible for a visit in 1 day Follow up with orthopedics [address] [place] [address] Rd  [address] [place]      Bryant Sanchez MD Schedule an appointment as soon as possible for a visit in 2 days Follow up with your primary care physician [address] [id number] [phone]      THE [facility] EMERGENCY DEPT Go to As needed, If symptoms worsen [address] [id number]  [phone]          Current Discharge Medication List      START taking these medications    Details   HYDROcodone-acetaminophen (Zaid Cox) 7.5-325 mg per tablet Take 1 Tab by mouth every six (6) hours as needed for Pain. Max Daily Amount: 4 Tabs. Qty: 20 Tab, Refills: 0         CONTINUE these medications which have NOT CHANGED    Details   budesonide-formoterol (SYMBICORT) 160-4.5 mcg/actuation HFA inhaler Take 2 Puffs by inhalation two (2) times a day.      gabapentin (NEURONTIN) 100 mg capsule Take  by mouth three (3) times daily. fluticasone (FLONASE) 50 mcg/actuation nasal spray 2 Sprays by Both Nostrils route daily. risedronate (ACTONEL) 150 mg tablet Take 150 mg by mouth every thirty (30) days. rOPINIRole (REQUIP) 1 mg tablet Take 2 mg by mouth three (3) times daily. Indications: Restless Legs Syndrome      pravastatin (PRAVACHOL) 40 mg tablet Take 40 mg by mouth nightly. tolterodine (DETROL) 2 mg tablet Take 1 Tab by mouth two (2) times a day. Indications: BLADDER HYPERACTIVITY  Qty: 60 Tab, Refills: 11      enoxaparin (LOVENOX) 60 mg/0.6 mL injection 60 mg by SubCUTAneous route daily. loratadine (CLARITIN) 10 mg tablet Take 10 mg by mouth daily. BUSPIRONE HCL (BUSPAR PO) Take  by mouth. FUROSEMIDE (LASIX PO) Take  by mouth. OMEPRAZOLE (PRILOSEC PO) Take  by mouth. MONTELUKAST SODIUM (SINGULAIR PO) Take  by mouth. TRAMADOL HCL (TRAMADOL PO) Take  by mouth. TRAZODONE HCL (TRAZODONE PO) Take  by mouth. SERTRALINE HCL (ZOLOFT PO) Take  by mouth. albuterol (PROVENTIL HFA, VENTOLIN HFA, PROAIR HFA) 90 mcg/actuation inhaler Take  by inhalation. ATTESTATIONS:  This note is prepared by Saul Jacinto, acting as Scribe for [employer], MD .    [employer], MD : The scribe's documentation has been prepared under my direction and personally reviewed by me in its entirety. I confirm that the note above accurately reflects all work, treatment, procedures, and medical decision making performed by me.

## 2017-01-03 NOTE — DISCHARGE INSTRUCTIONS
Broken Arm: Care Instructions  Your Care Instructions  Fractures can range from a small, hairline crack, to a bone or bones broken into two or more pieces. Your treatment depends on how bad the break is. Your doctor may have put your arm in a splint or cast to allow it to heal or to keep it stable until you see another doctor. It may take weeks or months for your arm to heal. You can help your arm heal with some care at home. You heal best when you take good care of yourself. Eat a variety of healthy foods, and don't smoke. You may have had a sedative to help you relax. You may be unsteady after having sedation. It can take a few hours for the medicine's effects to wear off. Common side effects of sedation include nausea, vomiting, and feeling sleepy or tired. The doctor has checked you carefully, but problems can develop later. If you notice any problems or new symptoms, get medical treatment right away. Follow-up care is a key part of your treatment and safety. Be sure to make and go to all appointments, and call your doctor if you are having problems. It's also a good idea to know your test results and keep a list of the medicines you take. How can you care for yourself at home? · If the doctor gave you a sedative:  ¨ For 24 hours, don't do anything that requires attention to detail. It takes time for the medicine's effects to completely wear off. ¨ For your safety, do not drive or operate any machinery that could be dangerous. Wait until the medicine wears off and you can think clearly and react easily. · Put ice or a cold pack on your arm for 10 to 20 minutes at a time. Try to do this every 1 to 2 hours for the next 3 days (when you are awake). Put a thin cloth between the ice and your cast or splint. Keep the cast or splint dry. · Follow the cast care instructions your doctor gives you. If you have a splint, do not take it off unless your doctor tells you to. · Be safe with medicines.  Take pain medicines exactly as directed. ¨ If the doctor gave you a prescription medicine for pain, take it as prescribed. ¨ If you are not taking a prescription pain medicine, ask your doctor if you can take an over-the-counter medicine. · Prop up your arm on pillows when you sit or lie down in the first few days after the injury. Keep the arm higher than the level of your heart. This will help reduce swelling. · Follow instructions for exercises to keep your arm strong. · Wiggle your fingers and wrist often to reduce swelling and stiffness. When should you call for help? Call 911 anytime you think you may need emergency care. For example, call if:  · You have trouble breathing. · You passed out (lost consciousness). Call your doctor now or seek immediate medical care if:  · You have new or worse nausea or vomiting. · You have increased or severe pain. · Your hand is cool or pale or changes color. · You have tingling, weakness, or numbness in your hand or fingers. · Your cast or splint feels too tight. · You cannot move your fingers. · The skin under your cast or splint is burning or stinging. Watch closely for changes in your health, and be sure to contact your doctor if:  · You do not get better as expected. Where can you learn more? Go to http://sarahi-nathaniel.info/. Enter O962 in the search box to learn more about \"Broken Arm: Care Instructions. \"  Current as of: May 23, 2016  Content Version: 11.1  © 2115-8805 [employer]. Care instructions adapted under license by [employer] (which disclaims liability or warranty for this information). If you have questions about a medical condition or this instruction, always ask your healthcare professional. [place] [address] any warranty or liability for your use of this information.

## 2017-01-03 NOTE — PROGRESS NOTES
Met with pt at bedside. Pt fell, fractured left humerus, plans to discharge home with ortho follow up. Pt lives alone on first floor of one floor apartment. She uses power wheelchair for mobility, walking short distance in apartment to bathroom and back to bed d/t spinal stenosis. Pt is independent, able to care for self prior to admission, uses [facility] for MD appointments, [facility] for shopping, etc.  Pt is right handed. Pt states she will be fine at home, is agreeable to home health short term. Pt feels home health can assist her with adapting to using right hand/arm only while she is healing. Pt has appointment with PCP Dr. Emerson Garza on January 11th, sees him q 3-4 months. FOC offered and pt chose [facility] for follow up; referral placed. Pt will need medical transport home for safety, states she has neighbors she will call to have check on her once she gets home; pt also has sister in [place] whom she can call if needed, but does not want to bother her as sister's  is dying. Plan: home via medical transport, [facility] for follow up. Care Management Interventions  PCP Verified by CM:  Yes  Transition of Care Consult (CM Consult): [address]: No  Current Support Network: Lives Alone  Confirm Follow Up Transport: [address]  discussed with Pt/Family/Caregiver: Yes  Freedom of Choice Offered: Yes  Discharge Location  Discharge Placement: Home with home health

## 2017-01-03 NOTE — ED NOTES
Ed medic remains at the bedside attempting IV access and blood work. Pt noted to have non-symmetrical nose with no swelling or bruising noted. Pt denies hitting her face during her 2 falls today. Pt reports that she is able to walk short distances in her house and to the bathroom. Pt reports that she uses the motorized wheel chair as well.

## 2017-01-03 NOTE — ED TRIAGE NOTES
Pt c/o left arm pain after \"tripping on the wheel of my hospital bed and falling. \"  Pt denies LOC. Pt arrived via ems. Per ems pt was given Zofran 4 mg, Fentanyl 50 mcg IV en route. Sepsis Screening completed    (  )Patient meets SIRS criteria. (xx  )Patient does not meet SIRS criteria.       SIRS Criteria is achieved when two or more of the following are present   Temperature < 96.8°F (36°C) or > 100.9°F (38.3°C)   Heart Rate > 90 beats per minute   Respiratory Rate > 20 beats per minute   WBC count > 12,000 or <4,000 or > 10% bands

## 2017-01-03 NOTE — ED NOTES
leonarda Conway, RN in to talk to patient will have medical transport transport her home due to patient is not ambulatory and has wheel chair and hospital bed at home. dEith Abdul will have home health care make a home visit for evaluation with adls and ot.

## 2017-01-04 ENCOUNTER — APPOINTMENT (OUTPATIENT)
Dept: ULTRASOUND IMAGING | Age: 67
DRG: 483 | End: 2017-01-04
Attending: FAMILY MEDICINE
Payer: MEDICARE

## 2017-01-04 PROBLEM — R29.6 FREQUENT FALLS: Status: ACTIVE | Noted: 2017-01-04

## 2017-01-04 PROBLEM — R09.02 HYPOXIA: Status: ACTIVE | Noted: 2017-01-04

## 2017-01-04 PROBLEM — N39.0 UTI (URINARY TRACT INFECTION): Status: ACTIVE | Noted: 2017-01-04

## 2017-01-04 PROBLEM — R79.89 ELEVATED LFTS: Status: ACTIVE | Noted: 2017-01-04

## 2017-01-04 PROBLEM — Z74.09 IMMOBILITY: Status: ACTIVE | Noted: 2017-01-04

## 2017-01-04 PROBLEM — S42.302A FRACTURE OF LEFT HUMERUS: Status: ACTIVE | Noted: 2017-01-04

## 2017-01-04 LAB
ALBUMIN SERPL BCP-MCNC: 2.4 G/DL (ref 3.4–5)
ALBUMIN/GLOB SERPL: 0.6 {RATIO} (ref 0.8–1.7)
ALP SERPL-CCNC: 143 U/L (ref 45–117)
ALT SERPL-CCNC: 294 U/L (ref 13–56)
ANION GAP BLD CALC-SCNC: 6 MMOL/L (ref 3–18)
AST SERPL W P-5'-P-CCNC: 868 U/L (ref 15–37)
BASOPHILS # BLD AUTO: 0 K/UL (ref 0–0.06)
BASOPHILS # BLD: 0 % (ref 0–2)
BILIRUB SERPL-MCNC: 1 MG/DL (ref 0.2–1)
BUN SERPL-MCNC: 11 MG/DL (ref 7–18)
BUN/CREAT SERPL: 15 (ref 12–20)
CALCIUM SERPL-MCNC: 8.4 MG/DL (ref 8.5–10.1)
CHLORIDE SERPL-SCNC: 108 MMOL/L (ref 100–108)
CO2 SERPL-SCNC: 30 MMOL/L (ref 21–32)
CREAT SERPL-MCNC: 0.75 MG/DL (ref 0.6–1.3)
DIFFERENTIAL METHOD BLD: ABNORMAL
EOSINOPHIL # BLD: 0 K/UL (ref 0–0.4)
EOSINOPHIL NFR BLD: 0 % (ref 0–5)
ERYTHROCYTE [DISTWIDTH] IN BLOOD BY AUTOMATED COUNT: 14.2 % (ref 11.6–14.5)
EST. AVERAGE GLUCOSE BLD GHB EST-MCNC: 126 MG/DL
GLOBULIN SER CALC-MCNC: 3.7 G/DL (ref 2–4)
GLUCOSE BLD STRIP.AUTO-MCNC: 101 MG/DL (ref 70–110)
GLUCOSE BLD STRIP.AUTO-MCNC: 110 MG/DL (ref 70–110)
GLUCOSE BLD STRIP.AUTO-MCNC: 111 MG/DL (ref 70–110)
GLUCOSE BLD STRIP.AUTO-MCNC: 114 MG/DL (ref 70–110)
GLUCOSE SERPL-MCNC: 117 MG/DL (ref 74–99)
HAV IGM SERPL QL IA: NEGATIVE
HBA1C MFR BLD: 6 % (ref 4.5–5.6)
HBV CORE IGM SER QL: NEGATIVE
HBV SURFACE AG SER QL: <0.1 INDEX
HBV SURFACE AG SER QL: NEGATIVE
HCT VFR BLD AUTO: 38.1 % (ref 35–45)
HCV AB SER IA-ACNC: 0.04 INDEX
HCV AB SERPL QL IA: NEGATIVE
HCV COMMENT,HCGAC: NORMAL
HGB BLD-MCNC: 12.1 G/DL (ref 12–16)
LYMPHOCYTES # BLD AUTO: 29 % (ref 21–52)
LYMPHOCYTES # BLD: 1.8 K/UL (ref 0.9–3.6)
MCH RBC QN AUTO: 28.4 PG (ref 24–34)
MCHC RBC AUTO-ENTMCNC: 31.8 G/DL (ref 31–37)
MCV RBC AUTO: 89.4 FL (ref 74–97)
MONOCYTES # BLD: 0.8 K/UL (ref 0.05–1.2)
MONOCYTES NFR BLD AUTO: 13 % (ref 3–10)
NEUTS SEG # BLD: 3.6 K/UL (ref 1.8–8)
NEUTS SEG NFR BLD AUTO: 58 % (ref 40–73)
PLATELET # BLD AUTO: 196 K/UL (ref 135–420)
PMV BLD AUTO: 11.5 FL (ref 9.2–11.8)
POTASSIUM SERPL-SCNC: 3.2 MMOL/L (ref 3.5–5.5)
PROT SERPL-MCNC: 6.1 G/DL (ref 6.4–8.2)
RBC # BLD AUTO: 4.26 M/UL (ref 4.2–5.3)
SODIUM SERPL-SCNC: 144 MMOL/L (ref 136–145)
SP1: NORMAL
SP2: NORMAL
SP3: NORMAL
WBC # BLD AUTO: 6.2 K/UL (ref 4.6–13.2)

## 2017-01-04 PROCEDURE — 80074 ACUTE HEPATITIS PANEL: CPT | Performed by: FAMILY MEDICINE

## 2017-01-04 PROCEDURE — 87077 CULTURE AEROBIC IDENTIFY: CPT | Performed by: FAMILY MEDICINE

## 2017-01-04 PROCEDURE — 82962 GLUCOSE BLOOD TEST: CPT

## 2017-01-04 PROCEDURE — 83036 HEMOGLOBIN GLYCOSYLATED A1C: CPT | Performed by: FAMILY MEDICINE

## 2017-01-04 PROCEDURE — 99218 HC RM OBSERVATION: CPT

## 2017-01-04 PROCEDURE — 74011250637 HC RX REV CODE- 250/637: Performed by: FAMILY MEDICINE

## 2017-01-04 PROCEDURE — 93005 ELECTROCARDIOGRAM TRACING: CPT

## 2017-01-04 PROCEDURE — 87186 SC STD MICRODIL/AGAR DIL: CPT | Performed by: FAMILY MEDICINE

## 2017-01-04 PROCEDURE — 74011250636 HC RX REV CODE- 250/636: Performed by: FAMILY MEDICINE

## 2017-01-04 PROCEDURE — 76705 ECHO EXAM OF ABDOMEN: CPT

## 2017-01-04 PROCEDURE — 85025 COMPLETE CBC W/AUTO DIFF WBC: CPT | Performed by: FAMILY MEDICINE

## 2017-01-04 PROCEDURE — 74011250637 HC RX REV CODE- 250/637: Performed by: HOSPITALIST

## 2017-01-04 PROCEDURE — 80053 COMPREHEN METABOLIC PANEL: CPT | Performed by: FAMILY MEDICINE

## 2017-01-04 PROCEDURE — 77010033678 HC OXYGEN DAILY

## 2017-01-04 PROCEDURE — 87086 URINE CULTURE/COLONY COUNT: CPT | Performed by: FAMILY MEDICINE

## 2017-01-04 PROCEDURE — 36415 COLL VENOUS BLD VENIPUNCTURE: CPT | Performed by: FAMILY MEDICINE

## 2017-01-04 PROCEDURE — 74011000258 HC RX REV CODE- 258: Performed by: HOSPITALIST

## 2017-01-04 RX ORDER — POTASSIUM CHLORIDE 20 MEQ/1
20 TABLET, EXTENDED RELEASE ORAL DAILY
Status: DISCONTINUED | OUTPATIENT
Start: 2017-01-04 | End: 2017-01-13 | Stop reason: HOSPADM

## 2017-01-04 RX ORDER — DEXTROSE MONOHYDRATE AND SODIUM CHLORIDE 5; .45 G/100ML; G/100ML
75 INJECTION, SOLUTION INTRAVENOUS CONTINUOUS
Status: DISCONTINUED | OUTPATIENT
Start: 2017-01-04 | End: 2017-01-05

## 2017-01-04 RX ORDER — CLINDAMYCIN PHOSPHATE 150 MG/ML
900 INJECTION, SOLUTION INTRAVENOUS
Status: DISCONTINUED | OUTPATIENT
Start: 2017-01-04 | End: 2017-01-04 | Stop reason: CLARIF

## 2017-01-04 RX ORDER — ROPINIROLE 1 MG/1
2 TABLET, FILM COATED ORAL
Status: DISCONTINUED | OUTPATIENT
Start: 2017-01-05 | End: 2017-01-05

## 2017-01-04 RX ORDER — INSULIN LISPRO 100 [IU]/ML
INJECTION, SOLUTION INTRAVENOUS; SUBCUTANEOUS
Status: DISCONTINUED | OUTPATIENT
Start: 2017-01-04 | End: 2017-01-13 | Stop reason: HOSPADM

## 2017-01-04 RX ORDER — FERROUS SULFATE, DRIED 160(50) MG
1 TABLET, EXTENDED RELEASE ORAL DAILY
COMMUNITY

## 2017-01-04 RX ORDER — DEXTROMETHORPHAN HYDROBROMIDE, GUAIFENESIN 5; 100 MG/5ML; MG/5ML
1300 LIQUID ORAL 2 TIMES DAILY
COMMUNITY

## 2017-01-04 RX ORDER — GABAPENTIN 300 MG/1
300 CAPSULE ORAL EVERY EVENING
Status: DISCONTINUED | OUTPATIENT
Start: 2017-01-04 | End: 2017-01-13 | Stop reason: HOSPADM

## 2017-01-04 RX ORDER — BISMUTH SUBSALICYLATE 262 MG
1 TABLET,CHEWABLE ORAL DAILY
COMMUNITY

## 2017-01-04 RX ORDER — GABAPENTIN 300 MG/1
600 CAPSULE ORAL
Status: DISCONTINUED | OUTPATIENT
Start: 2017-01-05 | End: 2017-01-13 | Stop reason: HOSPADM

## 2017-01-04 RX ORDER — DEXTROSE 50 % IN WATER (D50W) INTRAVENOUS SYRINGE
25-50 AS NEEDED
Status: DISCONTINUED | OUTPATIENT
Start: 2017-01-04 | End: 2017-01-13 | Stop reason: HOSPADM

## 2017-01-04 RX ORDER — FLAXSEED OIL 1000 MG
CAPSULE ORAL
COMMUNITY
End: 2021-07-28

## 2017-01-04 RX ORDER — LANOLIN ALCOHOL/MO/W.PET/CERES
1 CREAM (GRAM) TOPICAL DAILY
COMMUNITY

## 2017-01-04 RX ORDER — ROPINIROLE 0.25 MG/1
1 TABLET, FILM COATED ORAL EVERY EVENING
Status: DISCONTINUED | OUTPATIENT
Start: 2017-01-04 | End: 2017-01-13 | Stop reason: HOSPADM

## 2017-01-04 RX ORDER — TRAZODONE HYDROCHLORIDE 100 MG/1
100 TABLET ORAL
Status: DISCONTINUED | OUTPATIENT
Start: 2017-01-04 | End: 2017-01-13 | Stop reason: HOSPADM

## 2017-01-04 RX ORDER — MAGNESIUM SULFATE 100 %
4 CRYSTALS MISCELLANEOUS AS NEEDED
Status: DISCONTINUED | OUTPATIENT
Start: 2017-01-04 | End: 2017-01-13 | Stop reason: HOSPADM

## 2017-01-04 RX ADMIN — HYDROMORPHONE HYDROCHLORIDE 0.5 MG: 1 INJECTION, SOLUTION INTRAMUSCULAR; INTRAVENOUS; SUBCUTANEOUS at 02:24

## 2017-01-04 RX ADMIN — GABAPENTIN 300 MG: 300 CAPSULE ORAL at 17:22

## 2017-01-04 RX ADMIN — FLUTICASONE PROPIONATE AND SALMETEROL 1 PUFF: 50; 250 POWDER RESPIRATORY (INHALATION) at 21:50

## 2017-01-04 RX ADMIN — TOLTERODINE TARTRATE 2 MG: 2 TABLET, FILM COATED ORAL at 09:13

## 2017-01-04 RX ADMIN — HYDROMORPHONE HYDROCHLORIDE 0.5 MG: 1 INJECTION, SOLUTION INTRAMUSCULAR; INTRAVENOUS; SUBCUTANEOUS at 21:50

## 2017-01-04 RX ADMIN — LORATADINE 10 MG: 10 TABLET ORAL at 09:13

## 2017-01-04 RX ADMIN — PRAVASTATIN SODIUM 40 MG: 20 TABLET ORAL at 01:15

## 2017-01-04 RX ADMIN — HYDROMORPHONE HYDROCHLORIDE 0.5 MG: 1 INJECTION, SOLUTION INTRAMUSCULAR; INTRAVENOUS; SUBCUTANEOUS at 10:41

## 2017-01-04 RX ADMIN — HYDROMORPHONE HYDROCHLORIDE 0.5 MG: 1 INJECTION, SOLUTION INTRAMUSCULAR; INTRAVENOUS; SUBCUTANEOUS at 15:48

## 2017-01-04 RX ADMIN — TOLTERODINE TARTRATE 2 MG: 2 TABLET, FILM COATED ORAL at 21:50

## 2017-01-04 RX ADMIN — ENOXAPARIN SODIUM 60 MG: 60 INJECTION SUBCUTANEOUS at 09:13

## 2017-01-04 RX ADMIN — LEVOFLOXACIN 500 MG: 5 INJECTION, SOLUTION INTRAVENOUS at 01:12

## 2017-01-04 RX ADMIN — FLUTICASONE PROPIONATE AND SALMETEROL 1 PUFF: 50; 250 POWDER RESPIRATORY (INHALATION) at 09:14

## 2017-01-04 RX ADMIN — ROPINIROLE 1 MG: 1 TABLET, FILM COATED ORAL at 17:22

## 2017-01-04 RX ADMIN — HYDROMORPHONE HYDROCHLORIDE 0.5 MG: 1 INJECTION, SOLUTION INTRAMUSCULAR; INTRAVENOUS; SUBCUTANEOUS at 06:49

## 2017-01-04 RX ADMIN — FLUTICASONE PROPIONATE 2 SPRAY: 50 SPRAY, METERED NASAL at 09:14

## 2017-01-04 RX ADMIN — POTASSIUM CHLORIDE 20 MEQ: 20 TABLET, EXTENDED RELEASE ORAL at 15:48

## 2017-01-04 RX ADMIN — DEXTROSE MONOHYDRATE AND SODIUM CHLORIDE 75 ML/HR: 5; .45 INJECTION, SOLUTION INTRAVENOUS at 10:23

## 2017-01-04 RX ADMIN — HYDROCODONE BITARTRATE AND ACETAMINOPHEN 1 TABLET: 7.5; 325 TABLET ORAL at 19:56

## 2017-01-04 NOTE — ED NOTES
Pt hourly rounding competed. Safety   Pt (x) resting on stretcher with side rails up and call bell in reach. () in chair    () in parents arms. Toileting   Pt offered ()Bedpan     ()Assistance to Restroom     ()Urinal  Dewitt catheter inserted with patient tolerating well. Ongoing Updates  Updated on plan of care and status of test results.   Pain Management  Inquired as to comfort and offered comfort measures:    (x) warm blankets   (x) dimmed lights

## 2017-01-04 NOTE — ROUTINE PROCESS
10:32 PM Arrived at ED for report. ED RN not yet available for report. 11:27 PM   TRANSFER - IN REPORT:    Verbal report received from KIMBERLY Banda RN(name) on [employer]  being received from ED(unit) for routine progression of care      Report consisted of patients Situation, Background, Assessment and   Recommendations(SBAR). Information from the following report(s) SBAR, Kardex, Intake/Output and MAR was reviewed with the receiving nurse. Opportunity for questions and clarification was provided. Assessment completed upon patients arrival to unit and care assumed.

## 2017-01-04 NOTE — CONSULTS
Consult Note    Patient: Sarah Rivera               Sex: female          DOA: 1/3/2017         YOB: 1950      Age:  61 y.o.        LOS:  LOS: 1 day              HPI:     Sarah Rivera is a 61 y.o. female who has been seen for left proximal humeral displaced fracture    Past Medical History   Diagnosis Date    Active rheumatic fever with cardiac involvement     Anemia     Asthma     Chronic back pain     DDD (degenerative disc disease), cervical     Depression     [facility]     DVT (deep vein thrombosis) in pregnancy ([facility]     Edema     Factor V Leiden mutation ([facility]     Fatty liver disease, non-alcoholic 1/20/2278    GERD (gastroesophageal reflux disease)     Heart abnormality     HNP (herniated nucleus pulposus)     Hypercholesterolemia     Knee pain     Lymphedema     Osteopenia     RLS (restless legs syndrome)     Spinal stenosis     Uterine endometrial cancer, sarcoma (HCC)        Past Surgical History   Procedure Laterality Date    Hx gastric bypass       In [facility] ago.  Hx darryl and bso      Hx carpal tunnel release      Hx gastric bypass      Pr lap,cholecystectomy         History reviewed. No pertinent family history. Social History     Social History    Marital status: SINGLE     Spouse name: N/A    Number of children: N/A    Years of education: N/A     Social History Main Topics    Smoking status: Never Smoker    Smokeless tobacco: Never Used    Alcohol use No    Drug use: No    Sexual activity: Not Asked     Other Topics Concern    None     Social History Narrative       Prior to Admission medications    Medication Sig Start Date End Date Taking? Authorizing Provider   glucosamine-chondroitin (ARTHX) 500-400 mg cap Take 1 Cap by mouth daily. Yes Historical Provider   HYDROcodone-acetaminophen (NORCO) 7.5-325 mg per tablet Take 1 Tab by mouth every six (6) hours as needed for Pain. Max Daily Amount: 4 Tabs.  1/3/17  Yes Sissy SUTHERLAND MD Roger   budesonide-formoterol (SYMBICORT) 160-4.5 mcg/actuation HFA inhaler Take 2 Puffs by inhalation two (2) times a day. Yes Historical Provider   gabapentin (NEURONTIN) 100 mg capsule Take 300 mg by mouth two (2) times a day. 300 mg in evening, then 600 mg at bedtime  Indications: NEUROPATHIC PAIN   Yes Historical Provider   albuterol (PROVENTIL HFA, VENTOLIN HFA, PROAIR HFA) 90 mcg/actuation inhaler Take  by inhalation. Yes Historical Provider   fluticasone (FLONASE) 50 mcg/actuation nasal spray 2 Sprays by Both Nostrils route daily. Yes Historical Provider   risedronate (ACTONEL) 150 mg tablet Take 150 mg by mouth every thirty (30) days. Yes Historical Provider   rOPINIRole (REQUIP) 1 mg tablet Take 2 mg by mouth three (3) times daily. 1 mg in evening (1900), then 2 mg at bedtime (0100)  Indications: Restless Legs Syndrome   Yes Historical Provider   pravastatin (PRAVACHOL) 40 mg tablet Take 40 mg by mouth nightly. Yes Historical Provider   tolterodine (DETROL) 2 mg tablet Take 1 Tab by mouth two (2) times a day. Indications: BLADDER HYPERACTIVITY 9/21/16  Yes Jo Ann Coles MD   enoxaparin (LOVENOX) 60 mg/0.6 mL injection 60 mg by SubCUTAneous route daily. Yes Cedrick Ashraf MD   loratadine (CLARITIN) 10 mg tablet Take 10 mg by mouth daily. Yes Historical Provider   BUSPIRONE HCL (BUSPAR PO) Take 10 mg by mouth two (2) times a day. Yes Historical Provider   FUROSEMIDE (LASIX PO) Take 20 mg by mouth daily. Yes Historical Provider   MONTELUKAST SODIUM (SINGULAIR PO) Take  by mouth. Yes Historical Provider   TRAMADOL HCL (TRAMADOL PO) Take 50 mg by mouth four (4) times daily. Yes Historical Provider   TRAZODONE HCL (TRAZODONE PO) Take 200 mg by mouth nightly as needed for Other (sleep). Yes Historical Provider   SERTRALINE HCL (ZOLOFT PO) Take 100 mg by mouth daily. Yes Historical Provider   multivitamin (ONE A DAY) tablet Take 1 Tab by mouth daily.     Historical Provider calcium-vitamin D (OYSTER SHELL) 500 mg(1,250mg) -200 unit per tablet Take 1 Tab by mouth two (2) times daily (with meals). Historical Provider   omega-3 fatty acids-vitamin e (FISH OIL) 1,000 mg cap Take 1 Cap by mouth. Historical Provider   flaxseed oil 1,000 mg cap Take  by mouth. Historical Provider   acetaminophen (TYLENOL ARTHRITIS PAIN) 650 mg CR tablet Take 650 mg by mouth every six (6) hours as needed for Pain. Historical Provider       Allergies   Allergen Reactions    Augmentin [Amoxicillin-Pot Clavulanate] Other (comments)     Sever diarrhea    Codeine Unknown (comments)    Lodine [Etodolac] Unknown (comments)       Review of Systems  A comprehensive review of systems was negative except for that written in the History of Present Illness. Physical Exam:      Visit Vitals    /62    Pulse 94    Temp 98.4 °F (36.9 °C)    Resp 17    Ht 5' 3\" (1.6 m)    Wt 140.6 kg (310 lb)    SpO2 94%    BMI 54.91 kg/m2       Physical Exam:  Physical exam was negative except for: Musculoskeletal: positive for tenderness and swelling left shoulder    Labs Reviewed: All lab results for the last 24 hours reviewed. Assessment/Plan     Active Problems: Morbid obesity with BMI of 50.0-59.9, adult ([facility] (9/21/2016)      Frequent falls (1/4/2017)      UTI (urinary tract infection) (1/4/2017)      Elevated LFTs (1/4/2017)      Fracture of left humerus (1/4/2017)      Hypoxia (1/4/2017)      Immobility (1/4/2017)        Given the displacement between the two ER visits, she will be scheduled for a reverse total shoulder replacement at the end of the OR schedule Friday, 1/6/17 if she can be cleared medically. Case discussed with Dr. Kang Gutiérrez.

## 2017-01-04 NOTE — ROUTINE PROCESS
TRANSFER - OUT REPORT:    Verbal report given to [employer] on [employer]  being transferred to [facility] for routine progression of care       Report consisted of patients Situation, Background, Assessment and   Recommendations(SBAR). Information from the following report(s) SBAR, ED Summary and Intake/Output was reviewed with the receiving nurse. Lines:   Peripheral IV 01/03/17 Right Wrist (Active)   Site Assessment Clean, dry, & intact 1/3/2017  6:28 PM   Dressing Status Clean, dry, & intact 1/3/2017  6:28 PM   Dressing Type Transparent 1/3/2017  6:28 PM   Hub Color/Line Status Blue 1/3/2017  6:28 PM        Opportunity for questions and clarification was provided.       Patient transported with:   [employer]

## 2017-01-04 NOTE — PROGRESS NOTES
Hospitalist Progress Note    Patient: Lesa Ruffin MRN: [id number]  CSN: [id number]    YOB: 1950  Age: 61 y.o.   Sex: female    DOA: 1/3/2017 LOS:  LOS: 1 day          Chief Complaint:    UTI, humerus fx, fall      Assessment/Plan     Fall  Left humerus fracture  Immobility chronically  Morbid obesity  hypokalemia  Fatty liver, elevated LFT  UTI  NIDDM  Laceration right leg w/ sutures  Hypoxia  Coagulation problem-factor 5-on chronic lovenox  Hypoxia resolved    US of abd shows fatty liver  Continue levaquin for UTI, follow cx results  Add PO Kdur  Watch BS  Start IV dextrose as was NPO for US and has tendency to have low BS  Ortho consult pending  PT/OT  Look at acute rehab for her    Patient Active Problem List   Diagnosis Code    S/P gastric bypass Z98.84    NAFLD (nonalcoholic fatty liver disease) K76.0    Morbid obesity with BMI of 50.0-59.9, adult ([facility] [address] E66.01, Z68.43    Frequent falls R29.6    UTI (urinary tract infection) N39.0    Elevated LFTs R79.89    Fracture of left humerus S42.302A    Hypoxia R09.02    Immobility Z74.09       Subjective:  Pain in left arm  Usually in motorized wc at home  Denies N/V  No abd pain      Review of systems:    Constitutional: denies fevers, chills, myalgias  Respiratory: denies SOB, cough  Cardiovascular: denies chest pain, palpitations  Gastrointestinal: denies nausea, vomiting, diarrhea      Vital signs/Intake and Output:  Visit Vitals    /61    Pulse 98    Temp 98.9 °F (37.2 °C)    Resp 17    Ht 5' 3\" (1.6 m)    Wt 140.6 kg (310 lb)    SpO2 95%    BMI 54.91 kg/m2     Current Shift:     Last three shifts:  01/02 1901 - 01/04 0700  In: -   Out: 500 [Urine:500]    Exam:    General:obese WF, NAD, ox3  Head/Neck: NCAT, supple, No masses, No lymphadenopathy  CVS:Regular rate and rhythm, no M/R/G, S1/S2 heard, no thrill  Lungs:Clear to auscultation bilaterally, no wheezes, rhonchi, or rales  Abdomen: Soft, Nontender, No distention, Normal Bowel sounds, No hepatomegaly  Extremities: large legs, right ant shin lac with stitches, edema 2 plus LE  Neuro:grossly normal , follows commands  Psych:appropriate                Labs: Results:       Chemistry Recent Labs      01/04/17 0358 01/03/17   1845   GLU  117*  144*   NA  144  142   K  3.2*  3.8   CL  108  104   CO2  30  29   BUN  11  15   CREA  0.75  1.04   CA  8.4*  9.3   AGAP  6  9   BUCR  15  14   AP  143*  164*   TP  6.1*  8.0   ALB  2.4*  3.3*   GLOB  3.7  4.7*   AGRAT  0.6*  0.7*      CBC w/Diff Recent Labs      01/04/17 0358 01/03/17   1845   WBC  6.2  10.5   RBC  4.26  5.19   HGB  12.1  14.9   HCT  38.1  46.7*   PLT  196  241   GRANS  58  80*   LYMPH  29  12*   EOS  0  0      Cardiac Enzymes No results for input(s): CPK, CKND1, YESSENIA in the last 72 hours. No lab exists for component: CKRMB, TROIP   Coagulation No results for input(s): PTP, INR, APTT in the last 72 hours. No lab exists for component: INREXT    Lipid Panel No results found for: CHOL, CHOLPOCT, CHOLX, CHLST, CHOLV, [id number], HDL, LDL, NLDLCT, DLDL, LDLC, DLDLP, [id number], VLDLC, VLDL, TGL, TGLX, TRIGL, [id number], TRIGP, TGLPOCT, [id number], CHHD, CHHDX   BNP No results for input(s): BNPP in the last 72 hours.    Liver Enzymes Recent Labs      01/04/17 0358   TP  6.1*   ALB  2.4*   AP  143*   SGOT  868*      Thyroid Studies No results found for: T4, T3U, TSH, TSHEXT     Procedures/imaging: see electronic medical records for all procedures/Xrays and details which were not copied into this note but were reviewed prior to creation of Jocelyn Acuna MD

## 2017-01-04 NOTE — ED NOTES
Pt hourly rounding competed. Safety   Pt x() resting on stretcher with side rails up and call bell in reach. () in chair    () in parents arms. Toileting   Pt offered ()Bedpan     ()Assistance to Restroom     ()Urinal  Ongoing Updates  Updated on plan of care and status of test results.   Pain Management  Inquired as to comfort and offered comfort measures:    x() warm blankets   ()x dimmed lights

## 2017-01-04 NOTE — PROGRESS NOTES

## 2017-01-04 NOTE — PROGRESS NOTES
Readmission Risk Assessment:     High Risk and MSSP/[facility] patients    RRAT Score:  21 or greater (21)    Initial Assessment: Chart reviewed, noted 61 yr old female with [address] Medicaid admitted from home to observation for freq falls, UTI, elevated LFTs, left humerus fx, hypoxia; noted CMgr met with pt in the ED yesterday at which time pt stated pt would go home with neighbors checking on her and CMgr set up home health with [facility]; noted Ortho, PT, and OT consults are pending today. Met with pt who stated that: she had planned to go home alone, but has been told she should go to rehab instead. List of area facilities given to pt: [address] completed for [place] [address] (pt stated she did not want [facility] nor [facility]). Advised pt that once she chose an accepting SNF, the SNF would contact pt's [facility] Medicare for an auth for SNF. Advised pt of her observation status and that her [facility] [place] Medicare, unlike [facility] Medicare,  did not have the requirement of three inpt overnights before it would authorize SNF. Observation letter presented to and signed by pt; copy of letter given to pt; original letter placed on pt's paper chart. Referral called to CMS to send out to SNFs. Will cont to follow. Emergency Contact:      Pertinent Medical Hx:        PCP/Specialists:       Community Services:      DME:         High Risk Care Transition Plan:  1. Evaluate for [facility] or [facility], SNF, acute rehab, community care coordination of Resources. 2. Involve patient/caregiver in assessment, planning, education and implement of intervention. 3. CM daily patient care huddles/interdisciplinary rounds. 4. PCP/Specialist appointment within 48 hours of discharge unless otherwise specified. 5. Facilitate transportation and logistics for follow-up appointments. 6. Medication reconciliation [address]  7.  Discharge follow-up phone call within 2  4 days (NN, [employer] [facility], Nursing) CM follow up as assigned. 8. Formal handoff between hospital provider and post-acute provider to transition patient  9. Handoff to [address] [place] [address] Nurse Navigator or PCP practice.

## 2017-01-04 NOTE — ED NOTES
Patient tolerated suturing procedure well. Wound cleaned and dressed at this time. Patient is resting to comfort with no s/s distress noted.

## 2017-01-04 NOTE — PROGRESS NOTES
Shift Summary  US abdomen, EKG done today. 2 D echo ordered. Pt. Surgery scheduled Friday. Pain managed by pain medication. Denies any nausea.

## 2017-01-04 NOTE — PROGRESS NOTES
0230 Rash-like reaction developed at IV site during levofloxacin infusion, which started as a red line along the vein. Pt denies difficulty breathing. Dr. Libertad Wilcox was informed; no new orders regarding this, but continue to monitor. Also reviewed with him pt's PTA doses of gabapentin and requip versus current order; he stated to change orders as the pt does at home. New order of Trazodone 100mg po bedtime prn received. Pt was also placed on sliding scale insulin and hypoglycemic protocol. 0445 Pt reported fullness in bladder. Repositioned catheter, and urine began to drain. Pt stated relief. Post-scan yielded 0mL in bladder. SHIFT SUMMARY: Rash-like reaction on IV site resolved. NPO since 0200 in preparation for abd US. Pain located in left arm. Arm sling at bedside. Laceration on right mid leg has sutures. Slight serosanguinous drainage. Mepilex dressing applied. Offered BOOSTER shot, but pt declined during this shift and wanted to take it a later time before discharge.

## 2017-01-04 NOTE — ED NOTES
Pt hourly rounding competed. Safety   Pt ()x resting on stretcher with side rails up and call bell in reach. () in chair    () in parents arms. Toileting   Pt offered ()Bedpan     ()Assistance to Restroom     ()Urinal  Ongoing Updates  Updated on plan of care and status of test results.   Pain Management  Inquired as to comfort and offered comfort measures:    ()x warm blankets   (x) dimmed lights

## 2017-01-04 NOTE — ED NOTES
Pt hourly rounding competed. Safety   Pt x() resting on stretcher with side rails up and call bell in reach. () in chair    () in parents arms. Toileting   Pt offered ()Bedpan     ()Assistance to Restroom     ()Urinal  Ongoing Updates  Updated on plan of care and status of test results.   Pain Management  Inquired as to comfort and offered comfort measures:    x) warm blankets   ()x dimmed lights

## 2017-01-04 NOTE — PROGRESS NOTES
Occupational Therapy Screening:  Services are indicated. Evaluate and Treat Order is requested. An InBasket screening referral was triggered for occupational therapy based on results obtained during the nursing admission assessment. The patients chart was reviewed and the patient is appropriate for a skilled therapy evaluation. Please order a consult for occupational therapy if you are in agreement and would like an evaluation to be completed. Thank you.     Angely Bahena MS OTR/L

## 2017-01-04 NOTE — H&P
History & Physical    Patient: Yecenia Roblero MRN: [id number]  CSN: [id number]    YOB: 1950  Age: 61 y.o. Sex: female      DOA: 1/3/2017  Primary Care Provider:  Anita Mishra MD      Assessment/Plan     Patient Active Problem List   Diagnosis Code    Nocturia R35.1    Nocturnal enuresis N39.44    S/P gastric bypass Z98.84    Hyperlipidemia E78.5    NAFLD (nonalcoholic fatty liver disease) K76.0    Morbid obesity with BMI of 50.0-59.9, adult ([facility] [address] E66.01, Z68.43    Urge incontinence N39.41    minor BOGDAN, NO PADS N39.3       -Frequent falls  -UTI  -elevated LFTs  -Left humerus fracture  -hypoxia    Plan;  -admit to medicine for observation  -levaquin IV daily; f/u urine culture  -hepatitis profile, RUQ ultrasound  -orthopedic consultation for right humerus fx  -duonebs as needed  -pain control  -PT and case management consultation  -DVT ppx- lovenox        CC: frequent falls       HPI:     Yecenia Roblero is a 61 y.o. female who is non-ambulatory presenting to the ED C/O fall from motorized wheelchair while trying to close refrigerator door. Pt states her breast hit her joystick which made the wheelchair move forward causing her to fall onto her right side. Also suffered a laceration to the right leg during the course of the fall. Pt denies pain in legs. Pt was seen at the facility earlier today for another fall which caused a left humerus fracture. Pt is on lovenox. PMHx include asthma. Pt denies LOC or any head ot neck injury. Denies of any other symptoms or complaints. On arrival to the ED she was found to be hypoxic with O2 89%, started on O2 via NC and nebulizer with good results. UA shows UTI. Tanner Becker LFTs elevated. CXR shows BL atelectasis.     Past Medical History   Diagnosis Date    Active rheumatic fever with cardiac involvement     Anemia     Asthma     Chronic back pain     DDD (degenerative disc disease), cervical     Depression     Diabetes (HCC)     DVT (deep vein thrombosis) in pregnancy ([facility] [address]     Edema     Factor V Leiden mutation ([facility] [address]     Fatty liver disease, non-alcoholic 2/85/6147    GERD (gastroesophageal reflux disease)     Heart abnormality     HNP (herniated nucleus pulposus)     Hypercholesterolemia     Knee pain     Lymphedema     Osteopenia     RLS (restless legs syndrome)     Spinal stenosis     Uterine endometrial cancer, sarcoma (HCC)        Past Surgical History   Procedure Laterality Date    Hx gastric bypass       In [facility] years [facility].  Hx darryl and bso      Hx carpal tunnel release      Hx gastric bypass      Pr lap,cholecystectomy         History reviewed. No pertinent family history. Social History     Social History    Marital status: SINGLE     Spouse name: N/A    Number of children: N/A    Years of education: N/A     Social History Main Topics    Smoking status: Never Smoker    Smokeless tobacco: Never Used    Alcohol use No    Drug use: No    Sexual activity: Not Asked     Other Topics Concern    None     Social History Narrative       Prior to Admission medications    Medication Sig Start Date End Date Taking? Authorizing Provider   HYDROcodone-acetaminophen (NORCO) 7.5-325 mg per tablet Take 1 Tab by mouth every six (6) hours as needed for Pain. Max Daily Amount: 4 Tabs. 1/3/17  Yes John Reese MD   budesonide-formoterol (SYMBICORT) 160-4.5 mcg/actuation HFA inhaler Take 2 Puffs by inhalation two (2) times a day. Yes Historical Provider   gabapentin (NEURONTIN) 100 mg capsule Take  by mouth three (3) times daily. Yes Historical Provider   albuterol (PROVENTIL HFA, VENTOLIN HFA, PROAIR HFA) 90 mcg/actuation inhaler Take  by inhalation. Yes Historical Provider   fluticasone (FLONASE) 50 mcg/actuation nasal spray 2 Sprays by Both Nostrils route daily. Yes Historical Provider   risedronate (ACTONEL) 150 mg tablet Take 150 mg by mouth every thirty (30) days.    Yes Historical Provider   rOPINIRole (REQUIP) 1 mg tablet Take 2 mg by mouth three (3) times daily. Indications: Restless Legs Syndrome   Yes Historical Provider   pravastatin (PRAVACHOL) 40 mg tablet Take 40 mg by mouth nightly. Yes Historical Provider   tolterodine (DETROL) 2 mg tablet Take 1 Tab by mouth two (2) times a day. Indications: BLADDER HYPERACTIVITY 9/21/16  Yes Ricci Posada MD   enoxaparin (LOVENOX) 60 mg/0.6 mL injection 60 mg by SubCUTAneous route daily. Yes Cedrick Ashraf MD   loratadine (CLARITIN) 10 mg tablet Take 10 mg by mouth daily. Yes Historical Provider   BUSPIRONE HCL (BUSPAR PO) Take  by mouth. Yes Historical Provider   FUROSEMIDE (LASIX PO) Take  by mouth. Yes Historical Provider   MONTELUKAST SODIUM (SINGULAIR PO) Take  by mouth. Yes Historical Provider   TRAMADOL HCL (TRAMADOL PO) Take  by mouth. Yes Historical Provider   TRAZODONE HCL (TRAZODONE PO) Take  by mouth. Yes Historical Provider   SERTRALINE HCL (ZOLOFT PO) Take  by mouth. Yes Historical Provider       Allergies   Allergen Reactions    Augmentin [Amoxicillin-Pot Clavulanate] Other (comments)     Sever diarrhea    Codeine Unknown (comments)    Lodine [Etodolac] Unknown (comments)       Review of Systems  Gen: No fever, chills, malaise, weight loss/gain. Heent: No headache, rhinorrhea, epistaxis, ear pain, hearing loss, sinus pain, neck pain/stiffness, sore throat. Heart: No chest pain, palpitations, JUDGE, pnd, or orthopnea. Resp: No cough, hemoptysis, wheezing and shortness of breath. GI: No nausea, vomiting, diarrhea, constipation, melena or hematochezia. : No urinary obstruction, dysuria or hematuria. Derm: + leg wound. No rash or pruritis. Musc/skeletal: + arthralgias. .  Vasc: + leg edema. No cyanosis or claudication. Endo: No heat/cold intolerance, no polyuria,polydipsia or polyphagia. Neuro: No unilateral weakness, numbness, tingling. No seizures. Heme: No easy bruising or bleeding.           Physical Exam: Physical Exam:  Visit Vitals    /71 (BP 1 Location: Right arm, BP Patient Position: At rest)    Pulse 95    Temp 98.8 °F (37.1 °C)    Resp 17    Ht 5' 3\" (1.6 m)    Wt 140.6 kg (310 lb)    SpO2 96%    BMI 54.91 kg/m2    O2 Flow Rate (L/min): 2 l/min O2 Device: Nasal cannula    Temp (24hrs), Av.4 °F (36.9 °C), Min:98.1 °F (36.7 °C), Max:98.8 °F (37.1 °C)             General:  Awake, cooperative, no distress. Morbidly obese. Head:  Normocephalic, without obvious abnormality, atraumatic. Eyes:  Conjunctivae/corneas clear, sclera anicteric, PERRL, EOMs intact. Nose: Nares normal. No drainage or sinus tenderness. Throat: Lips, mucosa, and tongue normal.    Neck: Supple, symmetrical, trachea midline, no adenopathy. Lungs:   Coarse breath sounds bilaterally. [place] Heart:  Regular rate and rhythm, S1, S2 normal, no murmur, click, rub or gallop. Abdomen: Soft, non-tender. Bowel sounds normal. No masses,  No organomegaly. Extremities: Left arm in shoulder immobilizer, no cyanosis. 2+ BL LE edema Capillary refill normal. + right leg laceration with sutures intact   Pulses: 2+ and symmetric all extremities. Skin: Skin color pink, turgor normal. No rashes or lesions   Neurologic: CNII-XII intact. No focal motor or sensory deficit.       tib/fib X-ray shows NAP       CT SPINE CERV WO CONT   Final Result   1. No acute bony abnormality.      2. Multilevel spondylosis.     Please note that this CT was performed supine. It is highly sensitive for  fractures and dislocations but does not evaluate for ligamentous injury  including significant instability. If the patient's symptoms persist or if  otherwise clinically indicated, an erect plain film evaluation of the cervical  spine is suggested.   As read by the radiologist.      CT HEAD WO CONT   Final Result   1. No acute intracranial abnormalities.      2. Pansinus disease.   As read by the radiologist.    XR CHEST PORT   Final Result Bilateral subsegmental atelectasis or scarring. Otherwise, no focal  consolidation to suggest pneumonia. As read by the radiologist.          Labs Reviewed:    Recent Results (from the past 24 hour(s))   CBC WITH AUTOMATED DIFF    Collection Time: 01/03/17  6:45 PM   Result Value Ref Range    WBC 10.5 4.6 - 13.2 K/uL    RBC 5.19 4.20 - 5.30 M/uL    HGB 14.9 12.0 - 16.0 g/dL    HCT 46.7 (H) 35.0 - 45.0 %    MCV 90.0 74.0 - 97.0 FL    MCH 28.7 24.0 - 34.0 PG    MCHC 31.9 31.0 - 37.0 g/dL    RDW 14.0 11.6 - 14.5 %    PLATELET 538 679 - 963 K/uL    MPV 11.6 9.2 - 11.8 FL    NEUTROPHILS 80 (H) 40 - 73 %    LYMPHOCYTES 12 (L) 21 - 52 %    MONOCYTES 8 3 - 10 %    EOSINOPHILS 0 0 - 5 %    BASOPHILS 0 0 - 2 %    ABS. NEUTROPHILS 8.4 (H) 1.8 - 8.0 K/UL    ABS. LYMPHOCYTES 1.3 0.9 - 3.6 K/UL    ABS. MONOCYTES 0.9 0.05 - 1.2 K/UL    ABS. EOSINOPHILS 0.0 0.0 - 0.4 K/UL    ABS. BASOPHILS 0.0 0.0 - 0.06 K/UL    DF AUTOMATED     METABOLIC PANEL, COMPREHENSIVE    Collection Time: 01/03/17  6:45 PM   Result Value Ref Range    Sodium 142 136 - 145 mmol/L    Potassium 3.8 3.5 - 5.5 mmol/L    Chloride 104 100 - 108 mmol/L    CO2 29 21 - 32 mmol/L    Anion gap 9 3.0 - 18 mmol/L    Glucose 144 (H) 74 - 99 mg/dL    BUN 15 7.0 - 18 MG/DL    Creatinine 1.04 0.6 - 1.3 MG/DL    BUN/Creatinine ratio 14 12 - 20      GFR est AA >60 >60 ml/min/1.73m2    GFR est non-AA 53 (L) >60 ml/min/1.73m2    Calcium 9.3 8.5 - 10.1 MG/DL    Bilirubin, total 0.5 0.2 - 1.0 MG/DL     (H) 13 - 56 U/L     (H) 15 - 37 U/L    Alk.  phosphatase 164 (H) 45 - 117 U/L    Protein, total 8.0 6.4 - 8.2 g/dL    Albumin 3.3 (L) 3.4 - 5.0 g/dL    Globulin 4.7 (H) 2.0 - 4.0 g/dL    A-G Ratio 0.7 (L) 0.8 - 1.7     ACETAMINOPHEN    Collection Time: 01/03/17  6:45 PM   Result Value Ref Range    ACETAMINOPHEN <2 (L) 10 - 30 ug/mL       Procedures/imaging: see electronic medical records for all procedures/Xrays and details which were not copied into this note but were reviewed prior to creation of Plan    CC: Ar Coffey MD

## 2017-01-04 NOTE — ED NOTES
Assumed patient care at this time. Patient is noted to be resting to position of comfort at this time. Patient continues to report pain as documented. Patient is noted not to be wearing humeral brace at time of assessment d/t recent x-ray and patient is to go to CT. Patient was placed on O2 at 2 liter via NC d/t low sats of 88-89%. Patient does not appear to be in distress at this time.

## 2017-01-04 NOTE — PROGRESS NOTES
conducted an initial consultation and Spiritual Assessment for Farhan Mcconnell, who is a 61 y.o.,female. Patients Primary Language is: [place]. According to the patients EMR Nondenominational Affiliation is: Torrey Barry. The reason the Patient came to the hospital is:   Patient Active Problem List    Diagnosis Date Noted    Frequent falls 01/04/2017    UTI (urinary tract infection) 01/04/2017    Elevated LFTs 01/04/2017    Fracture of left humerus 01/04/2017    Hypoxia 01/04/2017    Immobility 01/04/2017    Morbid obesity with BMI of 50.0-59.9, adult ([facility] [address] 09/21/2016    NAFLD (nonalcoholic fatty liver disease) 01/02/2012    S/P gastric bypass         The  provided the following Interventions:  Initiated a relationship of care and support. Explored issues of shahab, belief, spirituality and Episcopal/ritual needs while hospitalized. Listened empathically. Provided chaplaincy education. Provided a brochure about [phone]. Offered assurance of prayer on patient's behalf. Chart reviewed. The following outcomes where achieved:  Patient shared limited information about both their medical narrative and spiritual journey/beliefs.  confirmed Patient's Nondenominational Affiliation. Patient expressed gratitude for 's visit. Assessment:  Patient stated she will be going to a rehab facility  Until she is able to care for herself at home. The admit documents state she is Episcopal, however, Patient states she is Torrey Barry not a Episcopal.  had admitting change patient to Torrey Barry in her Medical Record. Patient does not have any Episcopal/cultural needs that will affect patients preferences in health care. Plan:  Chaplains will continue to follow and will provide pastoral care on an as needed/requested basis.  recommends bedside caregivers page  on duty if patient shows signs of acute spiritual or emotional distress. Rev.  Kelsey Moura [address]  [phone]

## 2017-01-05 ENCOUNTER — APPOINTMENT (OUTPATIENT)
Dept: GENERAL RADIOLOGY | Age: 67
DRG: 483 | End: 2017-01-05
Attending: HOSPITALIST
Payer: MEDICARE

## 2017-01-05 ENCOUNTER — ANESTHESIA EVENT (OUTPATIENT)
Dept: SURGERY | Age: 67
DRG: 483 | End: 2017-01-05
Payer: MEDICARE

## 2017-01-05 LAB
ALBUMIN SERPL BCP-MCNC: 2.1 G/DL (ref 3.4–5)
ALBUMIN/GLOB SERPL: 0.5 {RATIO} (ref 0.8–1.7)
ALP SERPL-CCNC: 166 U/L (ref 45–117)
ALT SERPL-CCNC: 348 U/L (ref 13–56)
ANION GAP BLD CALC-SCNC: 6 MMOL/L (ref 3–18)
AST SERPL W P-5'-P-CCNC: 723 U/L (ref 15–37)
BASOPHILS # BLD AUTO: 0 K/UL (ref 0–0.06)
BASOPHILS # BLD: 0 % (ref 0–2)
BILIRUB SERPL-MCNC: 1 MG/DL (ref 0.2–1)
BUN SERPL-MCNC: 5 MG/DL (ref 7–18)
BUN/CREAT SERPL: 9 (ref 12–20)
CALCIUM SERPL-MCNC: 8.2 MG/DL (ref 8.5–10.1)
CHLORIDE SERPL-SCNC: 105 MMOL/L (ref 100–108)
CO2 SERPL-SCNC: 30 MMOL/L (ref 21–32)
CREAT SERPL-MCNC: 0.58 MG/DL (ref 0.6–1.3)
DIFFERENTIAL METHOD BLD: ABNORMAL
EOSINOPHIL # BLD: 0 K/UL (ref 0–0.4)
EOSINOPHIL NFR BLD: 1 % (ref 0–5)
ERYTHROCYTE [DISTWIDTH] IN BLOOD BY AUTOMATED COUNT: 14.2 % (ref 11.6–14.5)
GLOBULIN SER CALC-MCNC: 3.9 G/DL (ref 2–4)
GLUCOSE BLD STRIP.AUTO-MCNC: 113 MG/DL (ref 70–110)
GLUCOSE BLD STRIP.AUTO-MCNC: 116 MG/DL (ref 70–110)
GLUCOSE BLD STRIP.AUTO-MCNC: 154 MG/DL (ref 70–110)
GLUCOSE BLD STRIP.AUTO-MCNC: 98 MG/DL (ref 70–110)
GLUCOSE SERPL-MCNC: 136 MG/DL (ref 74–99)
HCT VFR BLD AUTO: 39.3 % (ref 35–45)
HGB BLD-MCNC: 12.3 G/DL (ref 12–16)
LYMPHOCYTES # BLD AUTO: 28 % (ref 21–52)
LYMPHOCYTES # BLD: 1.8 K/UL (ref 0.9–3.6)
MCH RBC QN AUTO: 28.3 PG (ref 24–34)
MCHC RBC AUTO-ENTMCNC: 31.3 G/DL (ref 31–37)
MCV RBC AUTO: 90.6 FL (ref 74–97)
MONOCYTES # BLD: 0.8 K/UL (ref 0.05–1.2)
MONOCYTES NFR BLD AUTO: 11 % (ref 3–10)
NEUTS SEG # BLD: 4 K/UL (ref 1.8–8)
NEUTS SEG NFR BLD AUTO: 60 % (ref 40–73)
PLATELET # BLD AUTO: 172 K/UL (ref 135–420)
PMV BLD AUTO: 11.6 FL (ref 9.2–11.8)
POTASSIUM SERPL-SCNC: 3.3 MMOL/L (ref 3.5–5.5)
PROT SERPL-MCNC: 6 G/DL (ref 6.4–8.2)
RBC # BLD AUTO: 4.34 M/UL (ref 4.2–5.3)
SODIUM SERPL-SCNC: 141 MMOL/L (ref 136–145)
WBC # BLD AUTO: 6.6 K/UL (ref 4.6–13.2)

## 2017-01-05 PROCEDURE — 94640 AIRWAY INHALATION TREATMENT: CPT

## 2017-01-05 PROCEDURE — 77010033678 HC OXYGEN DAILY

## 2017-01-05 PROCEDURE — 80053 COMPREHEN METABOLIC PANEL: CPT | Performed by: FAMILY MEDICINE

## 2017-01-05 PROCEDURE — 74011250636 HC RX REV CODE- 250/636: Performed by: INTERNAL MEDICINE

## 2017-01-05 PROCEDURE — 74011250637 HC RX REV CODE- 250/637: Performed by: HOSPITALIST

## 2017-01-05 PROCEDURE — 74011000250 HC RX REV CODE- 250: Performed by: FAMILY MEDICINE

## 2017-01-05 PROCEDURE — 73660 X-RAY EXAM OF TOE(S): CPT

## 2017-01-05 PROCEDURE — 99218 HC RM OBSERVATION: CPT

## 2017-01-05 PROCEDURE — 65270000029 HC RM PRIVATE

## 2017-01-05 PROCEDURE — G8979 MOBILITY GOAL STATUS: HCPCS

## 2017-01-05 PROCEDURE — 97163 PT EVAL HIGH COMPLEX 45 MIN: CPT

## 2017-01-05 PROCEDURE — 36415 COLL VENOUS BLD VENIPUNCTURE: CPT | Performed by: FAMILY MEDICINE

## 2017-01-05 PROCEDURE — 74011250637 HC RX REV CODE- 250/637: Performed by: FAMILY MEDICINE

## 2017-01-05 PROCEDURE — 82962 GLUCOSE BLOOD TEST: CPT

## 2017-01-05 PROCEDURE — 74011000258 HC RX REV CODE- 258: Performed by: HOSPITALIST

## 2017-01-05 PROCEDURE — C8924 2D TTE W OR W/O FOL W/CON,FU: HCPCS

## 2017-01-05 PROCEDURE — 77030027138 HC INCENT SPIROMETER -A

## 2017-01-05 PROCEDURE — 85025 COMPLETE CBC W/AUTO DIFF WBC: CPT | Performed by: FAMILY MEDICINE

## 2017-01-05 PROCEDURE — G8978 MOBILITY CURRENT STATUS: HCPCS

## 2017-01-05 PROCEDURE — 74011000250 HC RX REV CODE- 250: Performed by: INTERNAL MEDICINE

## 2017-01-05 PROCEDURE — 74011250636 HC RX REV CODE- 250/636: Performed by: HOSPITALIST

## 2017-01-05 PROCEDURE — 74011636637 HC RX REV CODE- 636/637: Performed by: FAMILY MEDICINE

## 2017-01-05 PROCEDURE — 74011250636 HC RX REV CODE- 250/636: Performed by: FAMILY MEDICINE

## 2017-01-05 RX ORDER — ROPINIROLE 1 MG/1
2 TABLET, FILM COATED ORAL
Status: DISCONTINUED | OUTPATIENT
Start: 2017-01-06 | End: 2017-01-13 | Stop reason: HOSPADM

## 2017-01-05 RX ORDER — POTASSIUM CHLORIDE 20 MEQ/1
40 TABLET, EXTENDED RELEASE ORAL
Status: COMPLETED | OUTPATIENT
Start: 2017-01-05 | End: 2017-01-05

## 2017-01-05 RX ORDER — FUROSEMIDE 10 MG/ML
40 INJECTION INTRAMUSCULAR; INTRAVENOUS ONCE
Status: COMPLETED | OUTPATIENT
Start: 2017-01-05 | End: 2017-01-05

## 2017-01-05 RX ORDER — FUROSEMIDE 40 MG/1
40 TABLET ORAL DAILY
Status: DISCONTINUED | OUTPATIENT
Start: 2017-01-05 | End: 2017-01-13 | Stop reason: HOSPADM

## 2017-01-05 RX ADMIN — FUROSEMIDE 40 MG: 10 INJECTION, SOLUTION INTRAMUSCULAR; INTRAVENOUS at 11:36

## 2017-01-05 RX ADMIN — TOLTERODINE TARTRATE 2 MG: 2 TABLET, FILM COATED ORAL at 22:06

## 2017-01-05 RX ADMIN — HYDROMORPHONE HYDROCHLORIDE 0.5 MG: 1 INJECTION, SOLUTION INTRAMUSCULAR; INTRAVENOUS; SUBCUTANEOUS at 13:51

## 2017-01-05 RX ADMIN — DEXTROSE MONOHYDRATE AND SODIUM CHLORIDE 75 ML/HR: 5; .45 INJECTION, SOLUTION INTRAVENOUS at 06:06

## 2017-01-05 RX ADMIN — FUROSEMIDE 40 MG: 40 TABLET ORAL at 12:58

## 2017-01-05 RX ADMIN — GABAPENTIN 600 MG: 300 CAPSULE ORAL at 01:19

## 2017-01-05 RX ADMIN — FLUTICASONE PROPIONATE AND SALMETEROL 1 PUFF: 50; 250 POWDER RESPIRATORY (INHALATION) at 10:00

## 2017-01-05 RX ADMIN — TOLTERODINE TARTRATE 2 MG: 2 TABLET, FILM COATED ORAL at 10:00

## 2017-01-05 RX ADMIN — HYDROCODONE BITARTRATE AND ACETAMINOPHEN 1 TABLET: 7.5; 325 TABLET ORAL at 05:17

## 2017-01-05 RX ADMIN — IPRATROPIUM BROMIDE AND ALBUTEROL SULFATE 3 ML: .5; 3 SOLUTION RESPIRATORY (INHALATION) at 10:28

## 2017-01-05 RX ADMIN — HYDROMORPHONE HYDROCHLORIDE 0.5 MG: 1 INJECTION, SOLUTION INTRAMUSCULAR; INTRAVENOUS; SUBCUTANEOUS at 01:20

## 2017-01-05 RX ADMIN — FLUTICASONE PROPIONATE AND SALMETEROL 1 PUFF: 50; 250 POWDER RESPIRATORY (INHALATION) at 22:07

## 2017-01-05 RX ADMIN — IPRATROPIUM BROMIDE AND ALBUTEROL SULFATE 3 ML: .5; 3 SOLUTION RESPIRATORY (INHALATION) at 22:06

## 2017-01-05 RX ADMIN — INSULIN LISPRO 2 UNITS: 100 INJECTION, SOLUTION INTRAVENOUS; SUBCUTANEOUS at 12:58

## 2017-01-05 RX ADMIN — HYDROMORPHONE HYDROCHLORIDE 0.5 MG: 1 INJECTION, SOLUTION INTRAMUSCULAR; INTRAVENOUS; SUBCUTANEOUS at 22:08

## 2017-01-05 RX ADMIN — HYDROMORPHONE HYDROCHLORIDE 0.5 MG: 1 INJECTION, SOLUTION INTRAMUSCULAR; INTRAVENOUS; SUBCUTANEOUS at 17:36

## 2017-01-05 RX ADMIN — GABAPENTIN 300 MG: 300 CAPSULE ORAL at 17:36

## 2017-01-05 RX ADMIN — ROPINIROLE 2 MG: 1 TABLET, FILM COATED ORAL at 01:20

## 2017-01-05 RX ADMIN — LORATADINE 10 MG: 10 TABLET ORAL at 10:01

## 2017-01-05 RX ADMIN — HYDROCODONE BITARTRATE AND ACETAMINOPHEN 1 TABLET: 7.5; 325 TABLET ORAL at 11:36

## 2017-01-05 RX ADMIN — ROPINIROLE 1 MG: 1 TABLET, FILM COATED ORAL at 17:36

## 2017-01-05 RX ADMIN — POTASSIUM CHLORIDE 20 MEQ: 20 TABLET, EXTENDED RELEASE ORAL at 10:00

## 2017-01-05 RX ADMIN — FLUTICASONE PROPIONATE 2 SPRAY: 50 SPRAY, METERED NASAL at 10:00

## 2017-01-05 RX ADMIN — POTASSIUM CHLORIDE 40 MEQ: 20 TABLET, EXTENDED RELEASE ORAL at 10:01

## 2017-01-05 RX ADMIN — IPRATROPIUM BROMIDE AND ALBUTEROL SULFATE 3 ML: .5; 3 SOLUTION RESPIRATORY (INHALATION) at 05:31

## 2017-01-05 RX ADMIN — HYDROMORPHONE HYDROCHLORIDE 0.5 MG: 1 INJECTION, SOLUTION INTRAMUSCULAR; INTRAVENOUS; SUBCUTANEOUS at 09:47

## 2017-01-05 RX ADMIN — HYDROCODONE BITARTRATE AND ACETAMINOPHEN 1 TABLET: 7.5; 325 TABLET ORAL at 18:23

## 2017-01-05 RX ADMIN — LEVOFLOXACIN 500 MG: 5 INJECTION, SOLUTION INTRAVENOUS at 01:20

## 2017-01-05 RX ADMIN — PERFLUTREN 1 ML: 6.52 INJECTION, SUSPENSION INTRAVENOUS at 12:16

## 2017-01-05 RX ADMIN — GABAPENTIN 600 MG: 300 CAPSULE ORAL at 22:06

## 2017-01-05 NOTE — ROUTINE PROCESS
Bedside and Verbal shift change report given to Loren Barbosa RN (oncoming nurse) by Ashley Jacobo RN   (offgoing nurse). Report included the following information SBAR, Kardex, Intake/Output, MAR and Recent Results.

## 2017-01-05 NOTE — PROGRESS NOTES
Problem: Mobility Impaired (Adult and Pediatric)  Goal: *Acute Goals and Plan of Care (Insert Text)  Physical Therapy Goals  Initiated 1/5/2017 to be met within 2 days  STGs:  1. Rolling toward right with min/mod assist; Supine to/from supine with min assist in prep for ADL activity. 2. Tolerated sitting EOB 10+min for ADL activity. Outcome: Progressing Towards Goal  PHYSICAL THERAPY EVALUATION     Patient: Josh Linn (75 y.o. female)  Date: 1/5/2017  Primary Diagnosis: fall  SHOULDER FRACTURE  Frequent falls  Procedure(s) (LRB):  REVERSE LEFT TOTAL SHOULDER ARTHROPLASTY, PATIENT IS IN ROOM 327 (Left)     Precautions:Fall      ASSESSMENT :  Based on the objective data described below, the patient presents with decreased independence in functional mobility with regard to bed mobility, transfers, gait, balance and activity tolerance following fall at home twice-sustaining fracture proximal left humerus. Patient reports pain 5/10 pre and 8/10 post treatment. Pt able to transition to sit EOB and demonstrate good sitting balance x8 min before fatigue and needing to return to supine (mod/max assist of 2 required). Additional time needed to complete all tasks. Required total assist of 2 and additional time to shift up in bed. Pt left sitting up in bed with all needs in reach and nurse [place] present  Pt scheduled for reverse TSR tomorrow. Will most likely require SNF for f/u rehab upon discharge from hospital.        Patient will benefit from skilled intervention to address the above impairments.   Patients rehabilitation potential is considered to be Fair  Factors which may influence rehabilitation potential include:   [ ]         None noted  [ ]         Mental ability/status  [X]         Medical condition  [ ]         Home/family situation and support systems  [ ]         Safety awareness  [ ]         Pain tolerance/management  [ ]         Other:        PLAN :  Recommendations and Planned Interventions:  [X]           Bed Mobility Training             [ ]    Neuromuscular Re-Education  [X]           Transfer Training                   [ ]    Orthotic/Prosthetic Training  [ ]           Gait Training                          [ ]    Modalities  [X]           Therapeutic Exercises          [ ]    Edema Management/Control  [X]           Therapeutic Activities            [X]    Patient and Family Training/Education  [ ]           Other (comment):     Frequency/Duration: Patient will be followed by physical therapy daily to address goals. Discharge Recommendations: Bryan Marmolejo  Further Equipment Recommendations for Discharge: N/A       SUBJECTIVE:   Patient stated It's just a pain.       OBJECTIVE DATA SUMMARY:       Past Medical History   Diagnosis Date    Active rheumatic fever with cardiac involvement      Anemia      Asthma      Chronic back pain      DDD (degenerative disc disease), cervical      Depression      Diabetes (HCC)      D[facility]deep vein thrombosis[facility]      Edema      Factor V Leiden mutation ([facility]      Fatty liver disease, non-alcoholic 9/98/8629    GERD (gastroesophageal reflux disease)      Heart abnormality      HNP (herniated nucleus pulposus)      Hypercholesterolemia      Knee pain      Lymphedema      Osteopenia      RLS (restless legs syndrome)      Spinal stenosis      Uterine endometrial cancer, sarcoma (HCC)       Past Surgical History   Procedure Laterality Date    Hx gastric bypass           In [facility] years ago.  Hx darryl and bso        Hx carpal tunnel release        Hx gastric bypass        Pr lap,cholecystectomy         Barriers to Learning/Limitations: yes;  physical  Compensate with: Verbal Cues and Tactile Cues  Prior Level of Function/Home Situation: pt reports ability to ambulate short distances in apartment (bedroom to living room).   Uses motorized w/c to traverse home otherwise  [address] [place] [address] Environment: Apartment  # Steps to Enter: 0  One/Two Story Residence: One story  Living Alone: Yes  Support Systems: Friends \ neighbors  Patient Expects to be Discharged to[de-identified] Apartment  Current DME Used/Available at Home: Cane, straight, Wheelchair, power  Tub or Shower Type: Shower (handicapped shower)  Critical Behavior:  Neurologic State: Alert; Appropriate for age  Orientation Level: Oriented X4  Cognition: Follows commands; Appropriate safety awareness; Appropriate for age attention/concentration; Appropriate decision making  Safety/Judgement: Awareness of environment; Fall prevention  Psychosocial  Patient Behaviors: Calm; Cooperative; Anxious; Other (comment) (anxious when moving to sit EOB, wimpering)  Purposeful Interaction: Yes  Pt Identified Daily Priority: Clinical issues (comment)  Caritas Process: Nurture loving kindness;Enable shahab/hope;Establish trust;Teaching/learning; Attend basic human needs;Create healing environment  Caring Interventions: Reassure; Therapeutic modalities  Reassure: Therapeutic listening; Informing;Caring rounds  Therapeutic Modalities: Humor; Intentional therapeutic touch  Skin Condition/Temp: Dry;Warm  Skin Integrity: Laceration (comment) (r leg)  Skin Integumentary  Skin Color: Appropriate for ethnicity  Skin Condition/Temp: Dry;Warm  Skin Integrity: Laceration (comment) (r leg)  Turgor: Non-tenting  Hair Growth: Present  Varicosities: Absent  Strength:    Strength: Generally decreased, functional (except left UE not tested; for shd surgery)  Tone & Sensation:   Tone: Normal  Sensation: Intact  Range Of Motion:  AROM: Generally decreased, functional (left UE not tested/for surgery iwona.)  PROM: Generally decreased, functional (as above)  Functional Mobility:  Bed Mobility:  Rolling: Moderate assistance;Assist x2; Additional time (to roll to right)  Supine to Sit: Moderate assistance;Assist x2; Additional time  Sit to Supine: Maximum assistance;Assist x2; Additional time (to LE's)  Scooting: Total assistance;Assist x2; Additional time  Transfers:  Sit to Stand:  (not tested)  Balance:   Sitting: Intact (EOB)  Standing:  (not tested)  Ambulation/Gait Training:  Gait Description (WDL):  (not tested) due to pt with Mild deformity proximal phalanx small toe  Pain:  Pain Scale 1: Numeric (0 - 10)  Pain Intensity 1: 9  Pain Location 1: Shoulder  Pain Orientation 1: Left  Pain Description 1: Other (comment) (pain)  Activity Tolerance:   Fair   Please refer to the flowsheet for vital signs taken during this treatment. After treatment:   [ ]         Patient left in no apparent distress sitting up in chair  [X]         Patient left in no apparent distress in bed  [X]         Call bell left within reach  [X]         Nursing notified - [facility]  [ ]         Caregiver present  [ ]         Bed alarm activated      COMMUNICATION/EDUCATION:   [X]         Fall prevention education was provided and the patient/caregiver indicated understanding. [ ]         Patient/family have participated as able in goal setting and plan of care. [X]         Patient/family agree to work toward stated goals and plan of care. [ ]         Patient understands intent and goals of therapy, but is neutral about his/her participation. [ ]         Patient is unable to participate in goal setting and plan of care. G-Codes (GP)  Mobility  [id number] Current  CM= 80-99%  [id number] Goal  CL= 60-79%        The severity rating is based on the Other functional mobility assessment score.         Thank you for this referral.  Clinton Drummond, PT   Time Calculation: 19 mins

## 2017-01-05 NOTE — ROUTINE PROCESS
Bedside and Verbal shift change report given to ROSHAN Fraga (oncoming nurse) by Donald Velasquez   (offgoing nurse). Report included the following information SBAR, Kardex, Procedure Summary, Intake/Output, MAR, Recent Results and Med Rec Status.

## 2017-01-05 NOTE — PROGRESS NOTES
Chart reviewed, noted Ortho plan for surgery on Friday afternoon if pt is cleared for surgery. Per eDischarge and email, pt has been accepted at [facility][1] and [facility] and at [facility]; the [facility] cannot accept; no response from [place] and [facility]. Met with pt who was interested in staying close to [facility] and stated she would like to go to [facility]. Pt with concerns about her post rehab plans; discussed with [facility] who reported their SW would assist pt with post rehab plans (i.e. Medicaid aides; assisted living; LTC); advised pt that [facility] SW would assist with her post rehab plans. Per [facility], since pt's shoulder surgery is scheduled for Friday at 4:30 they would probably have to wait until Monday to request an auth from pt's [facility]. Pt aware. Will f/u with [facility] on Monday for ins auth.

## 2017-01-05 NOTE — CONSULTS
[address] [place] [address] Rd    Name:  Girish Novak  MR#:  [id number]  :  1950  Account #:  [de-identified]  Date of Adm:  2017  Date of Consultation:  2017      CONCLUSIONS:  1. Left humeral fracture after a fall. 2. Immobility due to spinal stenosis and morbid obesity. 3. Status post gastric bypass. 4. Fatty liver and alcohol-related. 5. Laceration on leg after recent fall. 6. History of spinal stenosis. 7. Factor V Leiden, apparently homozygous with recurrent deep  venous thromboses in the past and inferior vena cava filter, on chronic  Lovenox. RECOMMENDATIONS:  1. Echocardiogram.  2. Probably want to proceed with surgery on her arm without  extraordinary risks from a cardiac standpoint. We will look at left  ventricular function on her echo. DISCUSSION: The patient's records were reviewed. The patient was  interviewed and examined. The patient is a 62-year-old lady who is nonambulatory, presented to  the ED complaining of fall from a motorized wheelchair while trying to  close the refrigerator door. She apparently lacerated her leg. She had  been seen earlier after a fall and had a humeral fracture and was sent  home, but then after the recurrent fall and laceration, she was  admitted. I am being asked to assess her at this time preoperatively. 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 She has been  on chronic Lovenox as noted. She reports a history of rheumatic fever  as a child without any heart damage, i.e., valvular heart disease, but  we will confirm that with echo. PAST MEDICAL HISTORY:  1. Rheumatic fever. 2. History of anemia. 3. Asthma. 4. Chronic back pain. 5. DJD. 6. Depression. 7. Diabetes. 8. DVT. 9. Recurrent edema. 10. Factor V Leiden, apparently homozygous. 11. Fatty liver. 12. GERD. 13. Spinal stenosis.   14. Hypercholesterolemia. 15. Knee pain. 16. Lymphedema. 17. Restless legs. 18. History of endometrial sarcoma. PAST SURGICAL HISTORY: Include gastric bypass, ORAL and BSO,  carpal tunnel release, lap cholecystectomy. FAMILY HISTORY: Noncontributory. SOCIAL HISTORY: The patient is single. She never smoked. Denies  alcohol abuse or illicit drug use. MEDICATIONS: At the time of admission:  1. Norco.  2. Symbicort. 3. Ventolin. 4. Proventil. 5. Neurontin. 6. Flonase. 7. Activella. 8. Requip. 9. Pravachol. 10. Detrol. 11. Claritin. 12. BuSpar. 13. Lasix. 14. Singulair. 15. Tramadol. 16. Trazodone. 16. Zoloft. DRUG ALLERGIES:  1. AUGMENTIN. 2. CODEINE. 3. LODINE. REVIEW OF SYSTEMS  GENERAL: No fever, chills, malaise, weight loss or gain. HEENT: No headache, rhinorrhea, epistaxis, ear pain, hearing loss,  sinus pain, neck pain, sinus congestion, or sore throat. HEART: No chest pain, palpitations, JUDGE, PND, or orthopnea. RESPIRATORY: No cough, sputum production, wheezing, or  hemoptysis. GASTROINTESTINAL: No nausea, vomiting, diarrhea, hematochezia,  or melena. GENITOURINARY: No dysuria. No hematuria. DERMATOLOGY: Leg wound with sutures. No rash or pruritus. MUSCULOSKELETAL: Positive arthralgias  VASCULAR: Positive for edema. No cyanosis, no claudication. ENDOCRINE: No heat or cold intolerance, polyphagia, polyuria,  polydipsia. NEUROLOGIC: No TIA, CVA, seizures. HEMATOLOGIC: No easy bruising. No bleeding. The patient is  on chronic Lovenox for Factor V Leiden and recurrent DVTs. PHYSICAL EXAMINATION  GENERAL: Reveals an obese lady in no acute distress. VITAL SIGNS: Blood pressure is 139/75. HEENT: Pupils are equal and reactive to light and accommodation. Sclerae and conjunctivae are clear. No scleral icterus. Mouth, nose  and throat appear normal. Oropharynx appears normal.  NECK: Supple; thyroid is not enlarged. There is no JVD. Carotid  pulses are present bilaterally.  I do not hear any bruits. Trachea is in the  midline. CHEST: Lungs are clear anteriorly. CARDIAC: Precordium normal to palpation. There are no heaves,  thrills, or ectopic impulses. First and second heart sounds are normal. I  do not hear any murmurs, gallops, or rubs. ABDOMEN: Obese. No masses are appreciated. EXTREMITIES: Bilateral swelling and bilateral edema. Pedal pulses  are difficult to palpate because of the edema. NEUROLOGICAL: The patient is alert and oriented. Cranial nerves 2-  12 appear intact. Motor and sensation are grossly intact. Gait and  station are not tested. ASSESSMENT AND PLAN:  1. At this juncture, the patient appears stable from a cardiac  standpoint. We will get an echo to assess left ventricular function and  her valves given her history, but I do not hear any evidence of valvular  heart disease. 2. I believe her most likely perioperative complication would be  respiratory. Thank you for asking me to see this patient. I shall follow as deemed  appropriate.         MD Mode Antonio / Diana Zelaya  D:  01/05/2017   10:46  T:  01/05/2017   11:33  Job #:  [id number]

## 2017-01-05 NOTE — PROGRESS NOTES
Patient assessment completed. VSS. Pain controlled with prn Dilaudid and PO Norco. LUE held in place with sling. RLE with mepilex from fall. Bedrest. Dewitt in place draining dark yellow urine. Sx Friday. Concerns over what is to happen post discharge. Patient Vitals for the past 8 hrs:   Temp Pulse Resp BP SpO2   01/04/17 2338 98.1 °F (36.7 °C) 96 20 135/68 95 %       Bedside and Verbal shift change report given to MALIKA Page RN (oncoming nurse) by Lisa Emmanuel RN   (offgoing nurse). Report included the following information SBAR, Kardex and MAR.

## 2017-01-05 NOTE — PROGRESS NOTES
OT eval hold secondary to humeral fracture from fall. Will re-attempt eval s/p surgery tomorrow for fracture when pt more medically appropriate.  Thank you Lorena Grijalva/L

## 2017-01-05 NOTE — PROGRESS NOTES
Hospitalist Progress Note    Patient: Lesa Ruffin MRN: [id number]  CSN: [id number]    YOB: 1950  Age: 61 y.o.   Sex: female    DOA: 1/3/2017 LOS:  LOS: 1 day          Chief Complaint:    Fall with fracture, UTI      Assessment/Plan     Fall with left humerus fracture and displacement  UTI  Cough and congestion, ?fluid overload  Morbid obesity  hypokalemia  Fatty liver  Right foot pain  NIDDM  Hx gastric bypass  Chronic immobility    She denies any hx of cardiac disease and with prior surgeries and anesthesia she has never had an issue wakening from anesthesia nor apnea/breathing issues    Add glucerna shakes and stop IVF  Lasix IV X 1, resume her PO lasix  If congestion does not clear, repeat CXR  Add PO Kdur additional dose  Echo today and cardiology eval pre-op for evaluation  Continue rocephin for UTI  Has aguero for now as immobilized in bed  Monitor BS, stop IVF  LFT's appear stable for nowAdd mucinex BID  Xray right foot, ? fx right 5th toe  PT consult to see if she can trial some activity  Discussed with surgery, she is for shoulder replacement tomorrow as fx has worsened and is displaced-she is not a candidate for outpatient treatment considering her comorbidities and requirement for surgical intervention for arm/shoulder  DVT prophylaxis    Patient Active Problem List   Diagnosis Code    S/P gastric bypass Z98.84    NAFLD (nonalcoholic fatty liver disease) K76.0    Morbid obesity with BMI of 50.0-59.9, adult ([facility] E66.01, Z68.43    Frequent falls R29.6    UTI (urinary tract infection) N39.0    Elevated LFTs R79.89    Fracture of left humerus S42.302A    Hypoxia R09.02    Immobility Z74.09       Subjective:    Denies CP but has prod cough, clear phlegm, no fevers or chills  Pain m oderate left arm  Denies N/V/D  States her foot hurts, right fifth toe    Review of systems:    Constitutional: denies fevers, chills, myalgias  Respiratory: denies SOB  Cardiovascular: denies chest pain, palpitations  Gastrointestinal: denies nausea, vomiting, diarrhea      Vital signs/Intake and Output:  Visit Vitals    /75 (BP 1 Location: Left leg, BP Patient Position: At rest)    Pulse 91    Temp 98.4 °F (36.9 °C)    Resp 20    Ht 5' 3\" (1.6 m)    Wt 140.8 kg (310 lb 6.4 oz)    SpO2 96%    BMI 54.98 kg/m2     Current Shift:     Last three shifts:  01/03 1901 - 01/05 0700  In: 1056 [I.V.:1056]  Out: 1200 [Urine:1200]    Exam:    General: obese WF, alert, NAD, OX3  Head/Neck: NCAT, supple, No masses, No lymphadenopathy  CVS:Regular rate and rhythm, no M/R/G, S1/S2 heard, no thrill  Lungs:Congestion BL, upper primarily, no wheezes or rales, scattered rhonchi  Abdomen: Soft, Nontender, No distention, Normal Bowel sounds, No hepatomegaly  Extremities: lac right lower leg, sutured, clean, edema 2 plus LE, tender along right 5th MT region  Neuro:grossly normal , follows commands  Psych:appropriate                Labs: Results:       Chemistry Recent Labs      01/05/17 0209 01/04/17   0358 01/03/17   1845   GLU  136*  117*  144*   NA  141  144  142   K  3.3*  3.2*  3.8   CL  105  108  104   CO2  30  30  29   BUN  5*  11  15   CREA  0.58*  0.75  1.04   CA  8.2*  8.4*  9.3   AGAP  6  6  9   BUCR  9*  15  14   AP  166*  143*  164*   TP  6.0*  6.1*  8.0   ALB  2.1*  2.4*  3.3*   GLOB  3.9  3.7  4.7*   AGRAT  0.5*  0.6*  0.7*      CBC w/Diff Recent Labs      01/05/17 0209 01/04/17   0358  01/03/17   1845   WBC  6.6  6.2  10.5   RBC  4.34  4.26  5.19   HGB  12.3  12.1  14.9   HCT  39.3  38.1  46.7*   PLT  172  196  241   GRANS  60  58  80*   LYMPH  28  29  12*   EOS  1  0  0      Cardiac Enzymes No results for input(s): CPK, CKND1, YESSENIA in the last 72 hours. No lab exists for component: CKRMB, TROIP   Coagulation No results for input(s): PTP, INR, APTT in the last 72 hours.     No lab exists for component: INREXT    Lipid Panel No results found for: CHOL, CHOLPOCT, CHOLX, CHLST, CHOLV, [id number], HDL, LDL, NLDLCT, DLDL, LDLC, DLDLP, 738809, VLDLC, VLDL, TGL, TGLX, TRIGL, [id number], TRIGP, TGLPOCT, [id number], CHHD, CHHDX   BNP No results for input(s): BNPP in the last 72 hours.    Liver Enzymes Recent Labs      01/05/17   0209   TP  6.0*   ALB  2.1*   AP  166*   SGOT  723*      Thyroid Studies No results found for: T4, T3U, TSH, TSHEXT     Procedures/imaging: see electronic medical records for all procedures/Xrays and details which were not copied into this note but were reviewed prior to creation of Osiel Telles MD

## 2017-01-05 NOTE — PROGRESS NOTES
Shift summary: pt on bed rest with aguero in place draining to dark gabriel cloudy urine. L arm on a sling, bruises noted on upper and lower extremity. Mepilex to laceration on R leg. On 1 li O2, with congested cough noted, Resp. Therapist called for Neb treatment as ordered prn for noted wheezing later on in shift. Given Dilaudid 0.5 mg IV, alternating with Norco po for pain management. Scheduled for L total arthroplasty on Friday. Lab result relayed to incoming nurse.  Shift uneventful  Patient Vitals for the past 8 hrs:   Temp Pulse Resp BP SpO2   01/05/17 0747 98.4 °F (36.9 °C) 91 20 139/75 96 %   01/05/17 0531 - - - - 96 %

## 2017-01-05 NOTE — WOUND CARE
Pt seen by wound care for devaughn score of 16. Pt noted to have bruising and a laceration to her right shin from a fall at home, wound approximated with sutures. No other skin issues noted. Wound care will continue to follow pt per protocol.

## 2017-01-06 ENCOUNTER — APPOINTMENT (OUTPATIENT)
Dept: GENERAL RADIOLOGY | Age: 67
DRG: 483 | End: 2017-01-06
Attending: INTERNAL MEDICINE
Payer: MEDICARE

## 2017-01-06 ENCOUNTER — ANESTHESIA (OUTPATIENT)
Dept: SURGERY | Age: 67
DRG: 483 | End: 2017-01-06
Payer: MEDICARE

## 2017-01-06 LAB
ALBUMIN SERPL BCP-MCNC: 2.1 G/DL (ref 3.4–5)
ALBUMIN/GLOB SERPL: 0.5 {RATIO} (ref 0.8–1.7)
ALP SERPL-CCNC: 186 U/L (ref 45–117)
ALT SERPL-CCNC: 318 U/L (ref 13–56)
ANION GAP BLD CALC-SCNC: 6 MMOL/L (ref 3–18)
AST SERPL W P-5'-P-CCNC: 474 U/L (ref 15–37)
ATRIAL RATE: 93 BPM
BACTERIA SPEC CULT: NORMAL
BASOPHILS # BLD AUTO: 0 K/UL (ref 0–0.06)
BASOPHILS # BLD: 0 % (ref 0–2)
BILIRUB SERPL-MCNC: 0.8 MG/DL (ref 0.2–1)
BUN SERPL-MCNC: 7 MG/DL (ref 7–18)
BUN/CREAT SERPL: 11 (ref 12–20)
CALCIUM SERPL-MCNC: 8.4 MG/DL (ref 8.5–10.1)
CALCULATED P AXIS, ECG09: 56 DEGREES
CALCULATED R AXIS, ECG10: 14 DEGREES
CALCULATED T AXIS, ECG11: 53 DEGREES
CHLORIDE SERPL-SCNC: 102 MMOL/L (ref 100–108)
CO2 SERPL-SCNC: 33 MMOL/L (ref 21–32)
CREAT SERPL-MCNC: 0.63 MG/DL (ref 0.6–1.3)
DIAGNOSIS, 93000: NORMAL
DIFFERENTIAL METHOD BLD: ABNORMAL
EOSINOPHIL # BLD: 0.1 K/UL (ref 0–0.4)
EOSINOPHIL NFR BLD: 1 % (ref 0–5)
ERYTHROCYTE [DISTWIDTH] IN BLOOD BY AUTOMATED COUNT: 14.4 % (ref 11.6–14.5)
GLOBULIN SER CALC-MCNC: 4.1 G/DL (ref 2–4)
GLUCOSE BLD STRIP.AUTO-MCNC: 103 MG/DL (ref 70–110)
GLUCOSE BLD STRIP.AUTO-MCNC: 107 MG/DL (ref 70–110)
GLUCOSE BLD STRIP.AUTO-MCNC: 74 MG/DL (ref 70–110)
GLUCOSE BLD STRIP.AUTO-MCNC: 97 MG/DL (ref 70–110)
GLUCOSE SERPL-MCNC: 120 MG/DL (ref 74–99)
HCT VFR BLD AUTO: 38 % (ref 35–45)
HGB BLD-MCNC: 11.9 G/DL (ref 12–16)
LYMPHOCYTES # BLD AUTO: 23 % (ref 21–52)
LYMPHOCYTES # BLD: 2 K/UL (ref 0.9–3.6)
MCH RBC QN AUTO: 28.4 PG (ref 24–34)
MCHC RBC AUTO-ENTMCNC: 31.3 G/DL (ref 31–37)
MCV RBC AUTO: 90.7 FL (ref 74–97)
MONOCYTES # BLD: 0.8 K/UL (ref 0.05–1.2)
MONOCYTES NFR BLD AUTO: 10 % (ref 3–10)
NEUTS SEG # BLD: 5.8 K/UL (ref 1.8–8)
NEUTS SEG NFR BLD AUTO: 66 % (ref 40–73)
P-R INTERVAL, ECG05: 236 MS
PLATELET # BLD AUTO: 181 K/UL (ref 135–420)
PMV BLD AUTO: 12.1 FL (ref 9.2–11.8)
POTASSIUM SERPL-SCNC: 3.7 MMOL/L (ref 3.5–5.5)
PROT SERPL-MCNC: 6.2 G/DL (ref 6.4–8.2)
Q-T INTERVAL, ECG07: 362 MS
QRS DURATION, ECG06: 102 MS
QTC CALCULATION (BEZET), ECG08: 450 MS
RBC # BLD AUTO: 4.19 M/UL (ref 4.2–5.3)
SERVICE CMNT-IMP: NORMAL
SODIUM SERPL-SCNC: 141 MMOL/L (ref 136–145)
VENTRICULAR RATE, ECG03: 93 BPM
WBC # BLD AUTO: 8.7 K/UL (ref 4.6–13.2)

## 2017-01-06 PROCEDURE — 77010033678 HC OXYGEN DAILY

## 2017-01-06 PROCEDURE — 74011250637 HC RX REV CODE- 250/637: Performed by: HOSPITALIST

## 2017-01-06 PROCEDURE — 80053 COMPREHEN METABOLIC PANEL: CPT | Performed by: FAMILY MEDICINE

## 2017-01-06 PROCEDURE — 97535 SELF CARE MNGMENT TRAINING: CPT

## 2017-01-06 PROCEDURE — 74011250637 HC RX REV CODE- 250/637: Performed by: INTERNAL MEDICINE

## 2017-01-06 PROCEDURE — 97166 OT EVAL MOD COMPLEX 45 MIN: CPT

## 2017-01-06 PROCEDURE — 71010 XR CHEST PORT: CPT

## 2017-01-06 PROCEDURE — 77030011256 HC DRSG MEPILEX <16IN NO BORD MOLN -A

## 2017-01-06 PROCEDURE — 85025 COMPLETE CBC W/AUTO DIFF WBC: CPT | Performed by: FAMILY MEDICINE

## 2017-01-06 PROCEDURE — 74011250637 HC RX REV CODE- 250/637: Performed by: FAMILY MEDICINE

## 2017-01-06 PROCEDURE — 97530 THERAPEUTIC ACTIVITIES: CPT

## 2017-01-06 PROCEDURE — 65270000029 HC RM PRIVATE

## 2017-01-06 PROCEDURE — 36415 COLL VENOUS BLD VENIPUNCTURE: CPT | Performed by: FAMILY MEDICINE

## 2017-01-06 PROCEDURE — 74011250636 HC RX REV CODE- 250/636: Performed by: FAMILY MEDICINE

## 2017-01-06 PROCEDURE — 74011000250 HC RX REV CODE- 250: Performed by: FAMILY MEDICINE

## 2017-01-06 PROCEDURE — 82962 GLUCOSE BLOOD TEST: CPT

## 2017-01-06 RX ORDER — NYSTATIN 100000 [USP'U]/G
POWDER TOPICAL 2 TIMES DAILY
Status: DISCONTINUED | OUTPATIENT
Start: 2017-01-06 | End: 2017-01-13 | Stop reason: HOSPADM

## 2017-01-06 RX ORDER — DOCUSATE SODIUM 100 MG/1
100 CAPSULE, LIQUID FILLED ORAL DAILY
Status: DISCONTINUED | OUTPATIENT
Start: 2017-01-07 | End: 2017-01-11

## 2017-01-06 RX ADMIN — IPRATROPIUM BROMIDE AND ALBUTEROL SULFATE 3 ML: .5; 3 SOLUTION RESPIRATORY (INHALATION) at 13:41

## 2017-01-06 RX ADMIN — ROPINIROLE 2 MG: 1 TABLET, FILM COATED ORAL at 01:24

## 2017-01-06 RX ADMIN — LEVOFLOXACIN 500 MG: 5 INJECTION, SOLUTION INTRAVENOUS at 01:25

## 2017-01-06 RX ADMIN — HYDROCODONE BITARTRATE AND ACETAMINOPHEN 1 TABLET: 7.5; 325 TABLET ORAL at 14:01

## 2017-01-06 RX ADMIN — HYDROCODONE BITARTRATE AND ACETAMINOPHEN 1 TABLET: 7.5; 325 TABLET ORAL at 20:29

## 2017-01-06 RX ADMIN — TOLTERODINE TARTRATE 2 MG: 2 TABLET, FILM COATED ORAL at 11:49

## 2017-01-06 RX ADMIN — ROPINIROLE 1 MG: 1 TABLET, FILM COATED ORAL at 18:00

## 2017-01-06 RX ADMIN — ROPINIROLE 2 MG: 1 TABLET, FILM COATED ORAL at 23:20

## 2017-01-06 RX ADMIN — HYDROMORPHONE HYDROCHLORIDE 0.5 MG: 1 INJECTION, SOLUTION INTRAMUSCULAR; INTRAVENOUS; SUBCUTANEOUS at 23:19

## 2017-01-06 RX ADMIN — TOLTERODINE TARTRATE 2 MG: 2 TABLET, FILM COATED ORAL at 20:29

## 2017-01-06 RX ADMIN — NYSTATIN: 100000 POWDER TOPICAL at 23:19

## 2017-01-06 RX ADMIN — GUAIFENESIN 600 MG: 600 TABLET, EXTENDED RELEASE ORAL at 11:49

## 2017-01-06 RX ADMIN — HYDROMORPHONE HYDROCHLORIDE 0.5 MG: 1 INJECTION, SOLUTION INTRAMUSCULAR; INTRAVENOUS; SUBCUTANEOUS at 15:05

## 2017-01-06 RX ADMIN — HYDROMORPHONE HYDROCHLORIDE 0.5 MG: 1 INJECTION, SOLUTION INTRAMUSCULAR; INTRAVENOUS; SUBCUTANEOUS at 11:49

## 2017-01-06 RX ADMIN — LEVOFLOXACIN 500 MG: 5 INJECTION, SOLUTION INTRAVENOUS at 23:23

## 2017-01-06 RX ADMIN — GABAPENTIN 600 MG: 300 CAPSULE ORAL at 23:20

## 2017-01-06 RX ADMIN — HYDROCODONE BITARTRATE AND ACETAMINOPHEN 1 TABLET: 7.5; 325 TABLET ORAL at 08:56

## 2017-01-06 RX ADMIN — FUROSEMIDE 40 MG: 40 TABLET ORAL at 11:49

## 2017-01-06 RX ADMIN — FLUTICASONE PROPIONATE AND SALMETEROL 1 PUFF: 50; 250 POWDER RESPIRATORY (INHALATION) at 20:29

## 2017-01-06 RX ADMIN — HYDROMORPHONE HYDROCHLORIDE 0.5 MG: 1 INJECTION, SOLUTION INTRAMUSCULAR; INTRAVENOUS; SUBCUTANEOUS at 03:19

## 2017-01-06 RX ADMIN — HYDROMORPHONE HYDROCHLORIDE 0.5 MG: 1 INJECTION, SOLUTION INTRAMUSCULAR; INTRAVENOUS; SUBCUTANEOUS at 19:10

## 2017-01-06 RX ADMIN — HYDROCODONE BITARTRATE AND ACETAMINOPHEN 1 TABLET: 7.5; 325 TABLET ORAL at 01:24

## 2017-01-06 RX ADMIN — FLUTICASONE PROPIONATE 2 SPRAY: 50 SPRAY, METERED NASAL at 12:20

## 2017-01-06 RX ADMIN — GUAIFENESIN 600 MG: 600 TABLET, EXTENDED RELEASE ORAL at 20:29

## 2017-01-06 RX ADMIN — HYDROMORPHONE HYDROCHLORIDE 0.5 MG: 1 INJECTION, SOLUTION INTRAMUSCULAR; INTRAVENOUS; SUBCUTANEOUS at 07:23

## 2017-01-06 RX ADMIN — TRAZODONE HYDROCHLORIDE 100 MG: 100 TABLET, FILM COATED ORAL at 23:19

## 2017-01-06 RX ADMIN — FLUTICASONE PROPIONATE AND SALMETEROL 1 PUFF: 50; 250 POWDER RESPIRATORY (INHALATION) at 12:20

## 2017-01-06 RX ADMIN — GABAPENTIN 300 MG: 300 CAPSULE ORAL at 18:00

## 2017-01-06 RX ADMIN — POTASSIUM CHLORIDE 20 MEQ: 20 TABLET, EXTENDED RELEASE ORAL at 11:49

## 2017-01-06 RX ADMIN — LORATADINE 10 MG: 10 TABLET ORAL at 11:49

## 2017-01-06 NOTE — ROUTINE PROCESS
Bedside and Verbal shift change report given to German Escobedo RN (oncoming nurse) by Flaquito Beltran RN (offgoing nurse). Report included the following information SBAR, Kardex, ED Summary, Procedure Summary, Intake/Output, MAR, Recent Results and Med Rec Status.

## 2017-01-06 NOTE — PROGRESS NOTES
Cardiology Progress Note      1/6/2017 10:40 AM    Admit Date: 1/3/2017    Admit Diagnosis: fall  SHOULDER FRACTURE  Frequent falls      Subjective:     Kash Jacome denies chest pain, chest pressure/discomfort.     Visit Vitals    /65 (BP 1 Location: Left leg, BP Patient Position: At rest)    Pulse 95    Temp 98.1 °F (36.7 °C)    Resp 20    Ht 5' 3\" (1.6 m)    Wt 140.8 kg (310 lb 6.4 oz)    SpO2 94%    BMI 54.98 kg/m2     Current Facility-Administered Medications   Medication Dose Route Frequency    guaiFENesin SR (MUCINEX) tablet 600 mg  600 mg Oral Q12H    furosemide (LASIX) tablet 40 mg  40 mg Oral DAILY    rOPINIRole (REQUIP) tablet 2 mg  2 mg Oral QHS    insulin lispro (HUMALOG) injection   SubCUTAneous AC&HS    glucose chewable tablet 16 g  4 Tab Oral PRN    glucagon (GLUCAGEN) injection 1 mg  1 mg IntraMUSCular PRN    dextrose (D50W) injection syrg 12.5-25 g  25-50 mL IntraVENous PRN    gabapentin (NEURONTIN) capsule 300 mg  300 mg Oral QPM    gabapentin (NEURONTIN) capsule 600 mg  600 mg Oral QHS    rOPINIRole (REQUIP) tablet 1 mg  1 mg Oral QPM    traZODone (DESYREL) tablet 100 mg  100 mg Oral QHS PRN    potassium chloride (K-DUR, KLOR-CON) SR tablet 20 mEq  20 mEq Oral DAILY    clindamycin phosphate 900 mg in 0.9% sodium chloride (MBP/ADV) 100 mL ADV  900 mg IntraVENous ON CALL TO OR    Freedom zamora(AC),Tet Vac-PF (BOOSTRIX) suspension 0.5 mL  0.5 mL IntraMUSCular PRIOR TO DISCHARGE    HYDROcodone-acetaminophen (NORCO) 7.5-325 mg per tablet 1 Tab  1 Tab Oral Q6H PRN    fluticasone-salmeterol (ADVAIR) 250mcg-50mcg/puff  1 Puff Inhalation BID    fluticasone (FLONASE) 50 mcg/actuation nasal spray 2 Spray  2 Spray Both Nostrils DAILY    tolterodine (DETROL) tablet 2 mg  2 mg Oral BID    loratadine (CLARITIN) tablet 10 mg  10 mg Oral DAILY    HYDROmorphone (PF) (DILAUDID) injection 0.5 mg  0.5 mg IntraVENous Q4H PRN    albuterol-ipratropium (DUO-NEB) 2.5 MG-0.5 MG/3 ML  3 mL Nebulization Q4H PRN    levoFLOXacin (LEVAQUIN) 500 mg in D5W IVPB  500 mg IntraVENous Q24H         Objective:      Physical Exam:  Visit Vitals    /65 (BP 1 Location: Left leg, BP Patient Position: At rest)    Pulse 95    Temp 98.1 °F (36.7 °C)    Resp 20    Ht 5' 3\" (1.6 m)    Wt 140.8 kg (310 lb 6.4 oz)    SpO2 94%    BMI 54.98 kg/m2     General Appearance:  Well developed, well nourished,alert and oriented x 3, and individual in no acute distress. Ears/Nose/Mouth/Throat:   Hearing grossly normal.         Neck: Supple. Chest:   Lungs clear to auscultation bilaterally. Cardiovascular:  Regular rate and rhythm, S1, S2 normal, no murmur. Abdomen:   Soft, non-tender, bowel sounds are active. Extremities: No edema bilaterally. Skin: Warm and dry. Data Review:   Labs:    Recent Results (from the past 24 hour(s))   GLUCOSE, POC    Collection Time: 01/05/17 12:51 PM   Result Value Ref Range    Glucose (POC) 154 (H) 70 - 110 mg/dL   GLUCOSE, POC    Collection Time: 01/05/17  5:05 PM   Result Value Ref Range    Glucose (POC) 116 (H) 70 - 110 mg/dL   GLUCOSE, POC    Collection Time: 01/05/17  9:15 PM   Result Value Ref Range    Glucose (POC) 98 70 - 110 mg/dL   CBC WITH AUTOMATED DIFF    Collection Time: 01/06/17  3:20 AM   Result Value Ref Range    WBC 8.7 4.6 - 13.2 K/uL    RBC 4.19 (L) 4.20 - 5.30 M/uL    HGB 11.9 (L) 12.0 - 16.0 g/dL    HCT 38.0 35.0 - 45.0 %    MCV 90.7 74.0 - 97.0 FL    MCH 28.4 24.0 - 34.0 PG    MCHC 31.3 31.0 - 37.0 g/dL    RDW 14.4 11.6 - 14.5 %    PLATELET 000 851 - 042 K/uL    MPV 12.1 (H) 9.2 - 11.8 FL    NEUTROPHILS 66 40 - 73 %    LYMPHOCYTES 23 21 - 52 %    MONOCYTES 10 3 - 10 %    EOSINOPHILS 1 0 - 5 %    BASOPHILS 0 0 - 2 %    ABS. NEUTROPHILS 5.8 1.8 - 8.0 K/UL    ABS. LYMPHOCYTES 2.0 0.9 - 3.6 K/UL    ABS. MONOCYTES 0.8 0.05 - 1.2 K/UL    ABS. EOSINOPHILS 0.1 0.0 - 0.4 K/UL    ABS.  BASOPHILS 0.0 0.0 - 0.06 K/UL    DF AUTOMATED     METABOLIC PANEL, COMPREHENSIVE    Collection Time: 01/06/17  3:20 AM   Result Value Ref Range    Sodium 141 136 - 145 mmol/L    Potassium 3.7 3.5 - 5.5 mmol/L    Chloride 102 100 - 108 mmol/L    CO2 33 (H) 21 - 32 mmol/L    Anion gap 6 3.0 - 18 mmol/L    Glucose 120 (H) 74 - 99 mg/dL    BUN 7 7.0 - 18 MG/DL    Creatinine 0.63 0.6 - 1.3 MG/DL    BUN/Creatinine ratio 11 (L) 12 - 20      GFR est AA >60 >60 ml/min/1.73m2    GFR est non-AA >60 >60 ml/min/1.73m2    Calcium 8.4 (L) 8.5 - 10.1 MG/DL    Bilirubin, total 0.8 0.2 - 1.0 MG/DL     (H) 13 - 56 U/L     (H) 15 - 37 U/L    Alk. phosphatase 186 (H) 45 - 117 U/L    Protein, total 6.2 (L) 6.4 - 8.2 g/dL    Albumin 2.1 (L) 3.4 - 5.0 g/dL    Globulin 4.1 (H) 2.0 - 4.0 g/dL    A-G Ratio 0.5 (L) 0.8 - 1.7     GLUCOSE, POC    Collection Time: 01/06/17  9:19 AM   Result Value Ref Range    Glucose (POC) 103 70 - 110 mg/dL       Telemetry: normal sinus rhythm      Assessment:     Active Problems: Morbid obesity with BMI of 50.0-59.9, adult ([facility] [address] (9/21/2016)      Frequent falls (1/4/2017)      UTI (urinary tract infection) (1/4/2017)      Elevated LFTs (1/4/2017)      Fracture of left humerus (1/4/2017)      Hypoxia (1/4/2017)      Immobility (1/4/2017)        Plan:     Echo was limited because of body habitus. Not much info. Would proceed with surgery for fx humerus. I don't think the cardiac risk is extraordinary.       Heide Ornelas MD

## 2017-01-06 NOTE — ROUTINE PROCESS
Bedside and Verbal shift change report given to ROSHAN Perez RN (oncoming nurse) by Macho Parada RN (offgoing nurse). Report included the following information SBAR, Kardex, Intake/Output, MAR and Recent Results.

## 2017-01-06 NOTE — PROGRESS NOTES
1000- In to assess pt and noticed a congested cough. Dr. Mera Dino arrived and gave new orders. IV Lasix given. 1629- Pt considerably less congested sounding and improved lung sounds.

## 2017-01-06 NOTE — PROGRESS NOTES
Hospitalist Progress Note    Patient: Selin Soriano MRN: [id number]  CSN: [id number]    YOB: 1950  Age: 61 y.o. Sex: female    DOA: 1/3/2017 LOS:  LOS: 1 day          Chief Complaint: UTI, Bronchitis, CIFUENTES          Assessment/Plan     Patient Active Problem List   Diagnosis Code    S/P gastric bypass Z98.84    NAFLD (nonalcoholic fatty liver disease) K76.0    Morbid obesity with BMI of 50.0-59.9, adult ([facility] [address] E66.01, Z68.43    Frequent falls R29.6    UTI (urinary tract infection) N39.0    Elevated LFTs R79.89    Fracture of left humerus S42.302A    Hypoxia R09.02    Immobility Z74.09       Improved but persistent congestion, repeat CXR. Add mucinex BID  K improved; 3.7 today  Echo extremely limited evaluation. Defer to cardiology eval to clear for surgery. On Levaquin for UTI; no sign of Rocephin as previously written. Has aguero for now as immobilized in bed  Monitor BS, stop IVF  LFT's appear to be improving. For shoulder replacement tomorrow as fx has worsened and is displaced-she is not a candidate for outpatient treatment considering her comorbidities and requirement for surgical intervention for arm/shoulder  DVT prophylaxis  Defer clearance for surgery to cardiology who recommended recent echo    Subjective:    \"I still have congestion, I wouldn't want to have surgery if I have pneumonia\". Review of systems:    Constitutional: denies fevers, chills, myalgias  Respiratory: denies SOB, +cough  Cardiovascular: denies chest pain, palpitations  Gastrointestinal: denies nausea, vomiting, diarrhea      Vital signs/Intake and Output:  Visit Vitals    /65 (BP 1 Location: Left leg, BP Patient Position: At rest)    Pulse 95    Temp 98.1 °F (36.7 °C)    Resp 20    Ht 5' 3\" (1.6 m)    Wt 140.8 kg (310 lb 6.4 oz)    SpO2 94%    BMI 54.98 kg/m2     Current Shift:     Last three shifts:  01/04 1901 - 01/06 0700  In: 1856 [P.O.:700;  I.V.:1156]  Out: 3700 [[facility]:[id number]]    Exam:    General: Well developed, alert, NAD, OX3  Head/Neck: NCAT, supple, No masses, No lymphadenopathy  CVS:Regular rate and rhythm, no M/R/G, S1/S2 heard, no thrill  Lungs: no wheezes, + rhonchi, or rales  Abdomen: Soft, Nontender, No distention, Normal Bowel sounds, No hepatomegaly  Extremities: No C/C/E, pulses palpable 2+  Skin:normal texture and turgor, no rashes, no lesions  Neuro:grossly normal , follows commands  Psych:appropriate                Labs: Results:       Chemistry Recent Labs      01/06/17   0320  01/05/17   0209  01/04/17   0358   GLU  120*  136*  117*   NA  141  141  144   K  3.7  3.3*  3.2*   CL  102  105  108   CO2  33*  30  30   BUN  7  5*  11   CREA  0.63  0.58*  0.75   CA  8.4*  8.2*  8.4*   AGAP  6  6  6   BUCR  11*  9*  15   AP  186*  166*  143*   TP  6.2*  6.0*  6.1*   ALB  2.1*  2.1*  2.4*   GLOB  4.1*  3.9  3.7   AGRAT  0.5*  0.5*  0.6*      CBC w/Diff Recent Labs      01/06/17   0320  01/05/17   0209  01/04/17   0358   WBC  8.7  6.6  6.2   RBC  4.19*  4.34  4.26   HGB  11.9*  12.3  12.1   HCT  38.0  39.3  38.1   PLT  181  172  196   GRANS  66  60  58   LYMPH  23  28  29   EOS  1  1  0      Cardiac Enzymes No results for input(s): CPK, CKND1, YESSENIA in the last 72 hours. No lab exists for component: CKRMB, TROIP   Coagulation No results for input(s): PTP, INR, APTT in the last 72 hours. No lab exists for component: INREXT    Lipid Panel No results found for: CHOL, CHOLPOCT, CHOLX, CHLST, CHOLV, [id number], HDL, LDL, NLDLCT, DLDL, LDLC, DLDLP, [id number], VLDLC, VLDL, TGL, TGLX, TRIGL, [id number], TRIGP, TGLPOCT, [id number], CHHD, CHHDX   BNP No results for input(s): BNPP in the last 72 hours.    Liver Enzymes Recent Labs      01/06/17   0320   TP  6.2*   ALB  2.1*   AP  186*   SGOT  474*      Thyroid Studies No results found for: T4, T3U, TSH, TSHEXT     Procedures/imaging: see electronic medical records for all procedures/Xrays and details which were not copied into this note but were reviewed prior to creation of Amy Espana MD

## 2017-01-06 NOTE — PROGRESS NOTES
Problem: Self Care Deficits Care Plan (Adult)  Goal: *Acute Goals and Plan of Care (Insert Text)  Occupational Therapy Goals  Initiated 1/6/2017 within 7 day(s). 1. Patient will perform grooming with supervision/set-up   2. Patient will perform upper body dressing with minimal assistance/contact guard assist.  3. Patient will perform lower body dressing with moderate assistance . 4. Patient will participate in UE exercises to improve UE ROM and strength increased independence with ADLs (RUE only)  5. Patient will perform toilet transfer with moderate assistance  Outcome: Progressing Towards Goal  OCCUPATIONAL THERAPY EVALUATION     Patient: Caitie Bran (78 y.o. female)  Date: 1/6/2017  Primary Diagnosis: fall  SHOULDER FRACTURE  Frequent falls  Procedure(s) (LRB):  REVERSE LEFT TOTAL SHOULDER ARTHROPLASTY, PATIENT IS IN ROOM 327 (Left)     Precautions:   Fall      ASSESSMENT :  Based on the objective data described below, the patient presents with decreased functional strength, decreased functional balance, decreased overall activity tolerance limiting independence with ADLs. Pt supine in bed upon entering, agreeable to therapy. Pt required min A to perform supine to sit transfer with HOB elevated. While EOB, pt able to wash R side of chest and stomach and part of L chest area. Pt required assist to wash R arm and under L breast. Pt also washed upper portion of legs while seated. Pt required mod A to don new gown. Pt required min A to perform grooming while seated EOB. Pt returned to supine with mod A requiring assist for LE management. Pt required total Ax2 to scoot up to [facility]. Pt left supine in bed with needs in reach. Pt would benefit from continued OT services to improve safety and independence with ADL tasks/transfers. Education: Role of OT in acute care, plan of care     Patient will benefit from skilled intervention to address the above impairments.   Patients rehabilitation potential is considered to be Good  Factors which may influence rehabilitation potential include:   [ ]             None noted  [ ]             Mental ability/status  [X]             Medical condition  [ ]             Home/family situation and support systems  [ ]             Safety awareness  [X]             Pain tolerance/management  [ ]             Other:        PLAN :  Recommendations and Planned Interventions:  [X]               Self Care Training                  [X]        Therapeutic Activities  [X]               Functional Mobility Training    [ ]        Cognitive Retraining  [X]               Therapeutic Exercises           [X]        Endurance Activities  [X]               Balance Training                   [X]        Neuromuscular Re-Education  [ ]               Visual/Perceptual Training     [X]   Home Safety Training  [X]               Patient Education                 [X]        Family Training/Education  [ ]               Other (comment):     Frequency/Duration: Patient will be followed by occupational therapy 3-5 times a week to address goals.   Discharge Recommendations: Rehab  Further Equipment Recommendations for Discharge: TBD by next level of care       SUBJECTIVE:   Patient stated .      OBJECTIVE DATA SUMMARY:       Past Medical History   Diagnosis Date    Active rheumatic fever with cardiac involvement      Anemia      Asthma      Chronic back pain      DDD (degenerative disc disease), cervical      Depression      Diabetes ([facility] [address]      [facility]      Edema      Factor V Leiden mutation ([facility]      Fatty liver disease, non-alcoholic 6/99/7438    GERD (gastroesophageal reflux disease)      Heart abnormality      HNP (herniated nucleus pulposus)      Hypercholesterolemia      Knee pain      Lymphedema      Osteopenia      RLS (restless legs syndrome)      Spinal stenosis      Uterine endometrial cancer, sarcoma (HCC)       Past Surgical History Procedure Laterality Date    Hx gastric bypass           In [facility] years ago.  Hx darryl and bso        Hx carpal tunnel release        Hx gastric bypass        Pr lap,cholecystectomy         Barriers to Learning/Limitations: None  Compensate with: visual, verbal, tactile, kinesthetic cues/model  GCODES (GO)n/a  Prior Level of Function/Home Situation: uses power wheelchair for mobility, I with ADLs  Home Situation  Home Environment: Apartment  # Steps to Enter: 0  One/Two Story Residence: One story  Living Alone: Yes  Support Systems: Friends \ neighbors  Patient Expects to be Discharged to[de-identified] Apartment  Current DME Used/Available at Home: Cane, straight, Wheelchair, power  Tub or Shower Type: Shower     Cognitive/Behavioral Status:  Neurologic State: Alert  Orientation Level: Oriented X4  Cognition: Appropriate decision making; Follows commands  Safety/Judgement: Fall prevention        Coordination:  Coordination: Generally decreased, functional (LUE not assessed)  Fine Motor Skills-Upper: Left Impaired;Right Intact    Gross Motor Skills-Upper: Left Impaired;Right Intact  Balance:  Sitting: Intact  Strength:  Strength: Generally decreased, functional (LUE not assessed)              Tone & Sensation:  Tone: Normal  Sensation: Intact     Range of Motion:  AROM: Generally decreased, functional (LUE not assessed)  PROM: Generally decreased, functional (LUE not assessed)        Functional Mobility and Transfers for ADLs:  Bed Mobility:  Rolling: Moderate assistance;Assist x2; Additional time  Supine to Sit: Minimum assistance;Stand-by asssistance; Additional time (with HOB elevated)  Sit to Supine: Moderate assistance; Additional time  Scooting: Total assistance;Assist x2                 ADL Assessment:      Oral Facial Hygiene/Grooming: Minimum assistance     Bathing: Moderate assistance (upper body only)     Upper Body Dressing: Minimum assistance; Moderate assistance     ADL Intervention:  Grooming  Grooming Assistance: Minimum assistance  Brushing/Combing Hair: Minimum assistance     Upper Body Bathing  Bathing Assistance: Moderate assistance  Position Performed: Seated edge of bed     Upper Body Dressing Assistance  Dressing Assistance: Minimum assistance; Moderate assistance  Hospital Gown: Minimum  assistance; Moderate assistance     Cognitive Retraining  Safety/Judgement: Fall prevention     Pain:  Pre-treatment: 9  Post-treatment: 9  Activity Tolerance:   good  Please refer to the flowsheet for vital signs taken during this treatment. After treatment:   [ ] Patient left in no apparent distress sitting up in chair  [X] Patient left in no apparent distress in bed  [X] Call bell left within reach  [X] Nursing notified  [ ] Caregiver present  [ ] Bed alarm activated      COMMUNICATION/EDUCATION:   [ ] Home safety education was provided and the patient/caregiver indicated understanding. [X] Patient/family have participated as able in goal setting and plan of care. [X] Patient/family agree to work toward stated goals and plan of care. [ ] Patient understands intent and goals of therapy, but is neutral about his/her participation. [ ] Patient is unable to participate in goal setting and plan of care.      Thank you for this referral.  Michaela Langston MS OTR/L  Time Calculation: 38 mins

## 2017-01-06 NOTE — PROGRESS NOTES
Shift summary: Shift uneventful, patient rested during shift. Shoulder pain managed with IV Dilaudid and Norco. Patient NPO since midnight in preparation for surgery today.

## 2017-01-06 NOTE — PROGRESS NOTES
2348 Bedside report received from Josselyn Gordon RN. Pt is alert and oriented with no signs of distress. Bed is in the lowest position with call bell and phone within reach. No needs required at this time.

## 2017-01-07 LAB
GLUCOSE BLD STRIP.AUTO-MCNC: 103 MG/DL (ref 70–110)
GLUCOSE BLD STRIP.AUTO-MCNC: 111 MG/DL (ref 70–110)
GLUCOSE BLD STRIP.AUTO-MCNC: 92 MG/DL (ref 70–110)
GLUCOSE BLD STRIP.AUTO-MCNC: 97 MG/DL (ref 70–110)

## 2017-01-07 PROCEDURE — 65270000029 HC RM PRIVATE

## 2017-01-07 PROCEDURE — 97110 THERAPEUTIC EXERCISES: CPT

## 2017-01-07 PROCEDURE — 74011250637 HC RX REV CODE- 250/637: Performed by: INTERNAL MEDICINE

## 2017-01-07 PROCEDURE — 77010033678 HC OXYGEN DAILY

## 2017-01-07 PROCEDURE — 74011250636 HC RX REV CODE- 250/636: Performed by: FAMILY MEDICINE

## 2017-01-07 PROCEDURE — 74011250637 HC RX REV CODE- 250/637: Performed by: HOSPITALIST

## 2017-01-07 PROCEDURE — 82962 GLUCOSE BLOOD TEST: CPT

## 2017-01-07 PROCEDURE — 74011250637 HC RX REV CODE- 250/637: Performed by: FAMILY MEDICINE

## 2017-01-07 RX ADMIN — GUAIFENESIN 600 MG: 600 TABLET, EXTENDED RELEASE ORAL at 08:59

## 2017-01-07 RX ADMIN — HYDROCODONE BITARTRATE AND ACETAMINOPHEN 1 TABLET: 7.5; 325 TABLET ORAL at 18:29

## 2017-01-07 RX ADMIN — LORATADINE 10 MG: 10 TABLET ORAL at 08:59

## 2017-01-07 RX ADMIN — GABAPENTIN 300 MG: 300 CAPSULE ORAL at 18:19

## 2017-01-07 RX ADMIN — HYDROMORPHONE HYDROCHLORIDE 0.5 MG: 1 INJECTION, SOLUTION INTRAMUSCULAR; INTRAVENOUS; SUBCUTANEOUS at 21:39

## 2017-01-07 RX ADMIN — NYSTATIN: 100000 POWDER TOPICAL at 21:40

## 2017-01-07 RX ADMIN — DOCUSATE SODIUM 100 MG: 100 CAPSULE, LIQUID FILLED ORAL at 09:00

## 2017-01-07 RX ADMIN — HYDROMORPHONE HYDROCHLORIDE 0.5 MG: 1 INJECTION, SOLUTION INTRAMUSCULAR; INTRAVENOUS; SUBCUTANEOUS at 13:04

## 2017-01-07 RX ADMIN — TOLTERODINE TARTRATE 2 MG: 2 TABLET, FILM COATED ORAL at 08:59

## 2017-01-07 RX ADMIN — HYDROCODONE BITARTRATE AND ACETAMINOPHEN 1 TABLET: 7.5; 325 TABLET ORAL at 02:47

## 2017-01-07 RX ADMIN — NYSTATIN: 100000 POWDER TOPICAL at 09:00

## 2017-01-07 RX ADMIN — FUROSEMIDE 40 MG: 40 TABLET ORAL at 09:00

## 2017-01-07 RX ADMIN — FLUTICASONE PROPIONATE AND SALMETEROL 1 PUFF: 50; 250 POWDER RESPIRATORY (INHALATION) at 09:00

## 2017-01-07 RX ADMIN — HYDROMORPHONE HYDROCHLORIDE 0.5 MG: 1 INJECTION, SOLUTION INTRAMUSCULAR; INTRAVENOUS; SUBCUTANEOUS at 08:59

## 2017-01-07 RX ADMIN — GUAIFENESIN 600 MG: 600 TABLET, EXTENDED RELEASE ORAL at 21:38

## 2017-01-07 RX ADMIN — HYDROCODONE BITARTRATE AND ACETAMINOPHEN 1 TABLET: 7.5; 325 TABLET ORAL at 10:40

## 2017-01-07 RX ADMIN — FLUTICASONE PROPIONATE AND SALMETEROL 1 PUFF: 50; 250 POWDER RESPIRATORY (INHALATION) at 21:40

## 2017-01-07 RX ADMIN — HYDROMORPHONE HYDROCHLORIDE 0.5 MG: 1 INJECTION, SOLUTION INTRAMUSCULAR; INTRAVENOUS; SUBCUTANEOUS at 16:54

## 2017-01-07 RX ADMIN — POTASSIUM CHLORIDE 20 MEQ: 20 TABLET, EXTENDED RELEASE ORAL at 08:59

## 2017-01-07 RX ADMIN — FLUTICASONE PROPIONATE 2 SPRAY: 50 SPRAY, METERED NASAL at 09:00

## 2017-01-07 RX ADMIN — ROPINIROLE 1 MG: 1 TABLET, FILM COATED ORAL at 18:18

## 2017-01-07 RX ADMIN — TOLTERODINE TARTRATE 2 MG: 2 TABLET, FILM COATED ORAL at 21:39

## 2017-01-07 RX ADMIN — HYDROMORPHONE HYDROCHLORIDE 0.5 MG: 1 INJECTION, SOLUTION INTRAMUSCULAR; INTRAVENOUS; SUBCUTANEOUS at 04:46

## 2017-01-07 NOTE — PROGRESS NOTES
Hospitalist Progress Note    Patient: Flip Moe MRN: [id number]  CSN: [id number]    YOB: 1950  Age: 61 y.o. Sex: female    DOA: 1/3/2017 LOS:  LOS: 3 days          Chief Complaint: UTI, Bronchitis, CIFUENTES    Assessment/Plan     Patient Active Problem List   Diagnosis Code    S/P gastric bypass Z98.84    NAFLD (nonalcoholic fatty liver disease) K76.0    Morbid obesity with BMI of 50.0-59.9, adult ([facility] [address] E66.01, Z68.43    Frequent falls R29.6    UTI (urinary tract infection) N39.0    Elevated LFTs R79.89    Fracture of left humerus S42.302A    Hypoxia R09.02    Immobility Z74.09     Bronchitis; Improved but persistent congestion, repeat CXR; New right basilar consolidation developing since the prior study. Increasing bibasilar atelectasis. On levaquin  Echo extremely limited evaluation. As per cardiology; No increased cardiac risk for surgery and patient is cleared for surgery. On Levaquin for UTI  Has aguero for now as immobilized in bed  For shoulder replacement as fx has worsened and is displaced-she is not a candidate for outpatient treatment considering her comorbidities and requirement for surgical intervention for arm/shoulder  DVT prophylaxis         Subjective:  No acute events over night. Pain well controlled. Review of systems:    Constitutional: denies fevers, chills, myalgias  Respiratory: denies SOB, cough  Cardiovascular: denies chest pain, palpitations  Gastrointestinal: denies nausea, vomiting, diarrhea      Vital signs/Intake and Output:  Visit Vitals    /68 (BP 1 Location: Left leg, BP Patient Position: At rest)    Pulse 90    Temp 98.8 °F (37.1 °C)    Resp 20    Ht 5' 3\" (1.6 m)    Wt 138.9 kg (306 lb 4.8 oz)    SpO2 93%    BMI 54.26 kg/m2     Current Shift:  01/07 0701 - 01/07 1900  In: 240 [P.O.:240]  Out: -   Last three shifts:  01/05 1901 - 01/07 0700  In: 520 [P.O.:320;  I.V.:200]  Out: 2350 [Urine:2350]    Exam:    General: Well developed, alert, NAD, OX3  Head/Neck: NCAT, supple, No masses, No lymphadenopathy  CVS:Regular rate and rhythm, no M/R/G, S1/S2 heard, no thrill  Lungs:Clear to auscultation bilaterally, no wheezes, rhonchi, or rales  Abdomen: Soft, Nontender, No distention, Normal Bowel sounds, No hepatomegaly  Extremities: left arm in sling. 2+ LE edema bilaterally, pulses palpable 2+  Skin:normal texture and turgor, no rashes, no lesions  Neuro:grossly normal , follows commands  Psych:appropriate                Labs: Results:       Chemistry Recent Labs      01/06/17   0320  01/05/17   0209   GLU  120*  136*   NA  141  141   K  3.7  3.3*   CL  102  105   CO2  33*  30   BUN  7  5*   CREA  0.63  0.58*   CA  8.4*  8.2*   AGAP  6  6   BUCR  11*  9*   AP  186*  166*   TP  6.2*  6.0*   ALB  2.1*  2.1*   GLOB  4.1*  3.9   AGRAT  0.5*  0.5*      CBC w/Diff Recent Labs      01/06/17   0320  01/05/17   0209   WBC  8.7  6.6   RBC  4.19*  4.34   HGB  11.9*  12.3   HCT  38.0  39.3   PLT  181  172   GRANS  66  60   LYMPH  23  28   EOS  1  1      Cardiac Enzymes No results for input(s): CPK, CKND1, YESSENIA in the last 72 hours. No lab exists for component: CKRMB, TROIP   Coagulation No results for input(s): PTP, INR, APTT in the last 72 hours. No lab exists for component: INREXT    Lipid Panel No results found for: CHOL, CHOLPOCT, CHOLX, CHLST, CHOLV, [id number], HDL, LDL, NLDLCT, DLDL, LDLC, DLDLP, [id number], VLDLC, VLDL, TGL, TGLX, TRIGL, [id number], TRIGP, TGLPOCT, [id number], CHHD, CHHDX   BNP No results for input(s): BNPP in the last 72 hours.    Liver Enzymes Recent Labs      01/06/17   0320   TP  6.2*   ALB  2.1*   AP  186*   SGOT  474*      Thyroid Studies No results found for: T4, T3U, TSH, TSHEXT     Procedures/imaging: see electronic medical records for all procedures/Xrays and details which were not copied into this note but were reviewed prior to creation of Gris Johnson MD

## 2017-01-07 NOTE — ROUTINE PROCESS
Bedside and Verbal shift change report given to Maura Linda RN (oncoming nurse) by Rowena Titus RN (offgoing nurse). Report included the following information SBAR, Kardex, ED Summary, Procedure Summary, Intake/Output, MAR, Recent Results and Med Rec Status.

## 2017-01-07 NOTE — PROGRESS NOTES
Problem: Mobility Impaired (Adult and Pediatric)  Goal: *Acute Goals and Plan of Care (Insert Text)  Physical Therapy Goals  Initiated 1/5/2017 to be met within 2 days  STGs:  1. Rolling toward right with min/mod assist; Supine to/from supine with min assist in prep for ADL activity. 2. Tolerated sitting EOB 10+min for ADL activity. Outcome: Progressing Towards Goal  PHYSICAL THERAPY TREATMENT     Patient: Jennifer Ulrich (84 y.o. female)  Date: 1/7/2017  Diagnosis: fall  SHOULDER FRACTURE  Frequent falls <principal problem not specified>  Procedure(s) (LRB):  REVERSE LEFT TOTAL SHOULDER ARTHROPLASTY, PATIENT IS IN ROOM 327 (Left)    Precautions: Fall   Chart, physical therapy assessment, plan of care and goals were reviewed. ASSESSMENT:  Patient found supine in bed willing to work with PT. Still no clarification for weight bearing status, thus focused on supine therex this tx. Pt performed therex and encouraged to perform on own 3 x per day. Pt left supine in bed with needs in reach and lunch tray in front of her. Pt reports 6/10 pain at this time which she states is lower than it has been. Pt awaiting surgical intervention for left shoulder. Education: therex on own. Progression toward goals:  [ ]      Improving appropriately and progressing toward goals  [X]      Improving slowly and progressing toward goals  [ ]      Not making progress toward goals and plan of care will be adjusted       PLAN:  Patient continues to benefit from skilled intervention to address the above impairments. Continue treatment per established plan of care. Discharge Recommendations: To Be Determined  Further Equipment Recommendations for Discharge:  TBD       SUBJECTIVE:   Patient stated I would like to try to walk tomorrow.       OBJECTIVE DATA SUMMARY:   Critical Behavior:  Neurologic State: Alert  Orientation Level: Oriented X4  Cognition: Appropriate for age attention/concentration, Follows commands  Safety/Judgement: Fall prevention  Therapeutic Exercises:   SLR, heel slides, ankle pumps, hip abd/add x 10 bilaterally. Pain:  Pre tx pain: 6  Post tx pain: 6  Pain Scale 1: Numeric (0 - 10)  Pain Intensity 1: 6  Pain Location 1: Arm; Shoulder  Pain Orientation 1: Left  Activity Tolerance:   Fair  Please refer to the flowsheet for vital signs taken during this treatment.   After treatment:   [ ] Patient left in no apparent distress sitting up in chair  [X] Patient left in no apparent distress in bed  [X] Call bell left within reach  [ ] Nursing notified  [ ] Caregiver present  [ ] Bed alarm activated      Bonilla Mccormack PTA   Time Calculation: 12 mins

## 2017-01-07 NOTE — PROGRESS NOTES
Shift uneventful. Pt did not go to surgery. Possible pressure ulcer noted during occupational therapy. Notified Dr. Rhoda Dsouza and made him aware of the assessment finding. He came to assess area and suspects possible pressure ulcer. Recommends wound care consult.

## 2017-01-07 NOTE — PROGRESS NOTES
Shift Summary  Pt. Had uneventful shift. Pain well managed by pain medication. Denies nausea. Encourage intakes.

## 2017-01-07 NOTE — ROUTINE PROCESS
Bedside and Verbal shift change report given to ROSHAN Sethi RN (oncoming nurse) by Lisa Marinelli RN   (offgoing nurse). Report included the following information SBAR, Kardex, Intake/Output, MAR and Recent Results.

## 2017-01-07 NOTE — PROGRESS NOTES
Shift summary: Shoulder pain controlled with Dilaudid 0.5 mg IV, and oral Norco. Levaquin administered per MAR. Edwitt catheter draining gabriel urine.

## 2017-01-07 NOTE — PROGRESS NOTES
Cardiology Progress Note      1/7/2017 3:58 PM    Admit Date: 1/3/2017    Admit Diagnosis: fall  SHOULDER FRACTURE  Frequent falls      Subjective:     [place] Em denies chest pain.     Visit Vitals    /68 (BP 1 Location: Left leg, BP Patient Position: At rest)    Pulse 90    Temp 98.8 °F (37.1 °C)    Resp 20    Ht 5' 3\" (1.6 m)    Wt 138.9 kg (306 lb 4.8 oz)    SpO2 93%    BMI 54.26 kg/m2     Current Facility-Administered Medications   Medication Dose Route Frequency    guaiFENesin SR (MUCINEX) tablet 600 mg  600 mg Oral Q12H    docusate sodium (COLACE) capsule 100 mg  100 mg Oral DAILY    nystatin (MYCOSTATIN) 100,000 unit/gram powder   Topical BID    furosemide (LASIX) tablet 40 mg  40 mg Oral DAILY    rOPINIRole (REQUIP) tablet 2 mg  2 mg Oral QHS    insulin lispro (HUMALOG) injection   SubCUTAneous AC&HS    glucose chewable tablet 16 g  4 Tab Oral PRN    glucagon (GLUCAGEN) injection 1 mg  1 mg IntraMUSCular PRN    dextrose (D50W) injection syrg 12.5-25 g  25-50 mL IntraVENous PRN    gabapentin (NEURONTIN) capsule 300 mg  300 mg Oral QPM    gabapentin (NEURONTIN) capsule 600 mg  600 mg Oral QHS    rOPINIRole (REQUIP) tablet 1 mg  1 mg Oral QPM    traZODone (DESYREL) tablet 100 mg  100 mg Oral QHS PRN    potassium chloride (K-DUR, KLOR-CON) SR tablet 20 mEq  20 mEq Oral DAILY    clindamycin phosphate 900 mg in 0.9% sodium chloride (MBP/ADV) 100 mL ADV  900 mg IntraVENous ON CALL TO OR    Freedom zamora(AC),Tet Vac-PF (BOOSTRIX) suspension 0.5 mL  0.5 mL IntraMUSCular PRIOR TO DISCHARGE    HYDROcodone-acetaminophen (NORCO) 7.5-325 mg per tablet 1 Tab  1 Tab Oral Q6H PRN    fluticasone-salmeterol (ADVAIR) 250mcg-50mcg/puff  1 Puff Inhalation BID    fluticasone (FLONASE) 50 mcg/actuation nasal spray 2 Spray  2 Spray Both Nostrils DAILY    tolterodine (DETROL) tablet 2 mg  2 mg Oral BID    loratadine (CLARITIN) tablet 10 mg  10 mg Oral DAILY    HYDROmorphone (PF) (DILAUDID) injection 0.5 mg  0.5 mg IntraVENous Q4H PRN    albuterol-ipratropium (DUO-NEB) 2.5 MG-0.5 MG/3 ML  3 mL Nebulization Q4H PRN    levoFLOXacin (LEVAQUIN) 500 mg in D5W IVPB  500 mg IntraVENous Q24H         Objective:      Physical Exam:  Visit Vitals    /68 (BP 1 Location: Left leg, BP Patient Position: At rest)    Pulse 90    Temp 98.8 °F (37.1 °C)    Resp 20    Ht 5' 3\" (1.6 m)    Wt 138.9 kg (306 lb 4.8 oz)    SpO2 93%    BMI 54.26 kg/m2     General Appearance:  Well developed, well nourished,alert and oriented x 3, and individual in no acute distress. Ears/Nose/Mouth/Throat:   Hearing grossly normal.         Neck: Supple. Chest:   Lungs clear to auscultation bilaterally. Cardiovascular:  Regular rate and rhythm, S1, S2 normal, no murmur. Abdomen:   Soft, non-tender, bowel sounds are active. Extremities: No edema bilaterally. Skin: Warm and dry. Data Review:   Labs:    Recent Results (from the past 24 hour(s))   GLUCOSE, POC    Collection Time: 01/06/17  5:17 PM   Result Value Ref Range    Glucose (POC) 74 70 - 110 mg/dL   GLUCOSE, POC    Collection Time: 01/06/17  9:07 PM   Result Value Ref Range    Glucose (POC) 107 70 - 110 mg/dL   GLUCOSE, POC    Collection Time: 01/07/17  8:24 AM   Result Value Ref Range    Glucose (POC) 92 70 - 110 mg/dL   GLUCOSE, POC    Collection Time: 01/07/17 12:08 PM   Result Value Ref Range    Glucose (POC) 97 70 - 110 mg/dL       Telemetry: normal sinus rhythm      Assessment:     Active Problems: Morbid obesity with BMI of 50.0-59.9, adult ([facility] [address] (9/21/2016)      Frequent falls (1/4/2017)      UTI (urinary tract infection) (1/4/2017)      Elevated LFTs (1/4/2017)      Fracture of left humerus (1/4/2017)      Hypoxia (1/4/2017)      Immobility (1/4/2017)        Plan:     Stable from the cardiac standpoint. On for surgery on Monday.     Stephane Meyers MD

## 2017-01-08 LAB
GLUCOSE BLD STRIP.AUTO-MCNC: 104 MG/DL (ref 70–110)
GLUCOSE BLD STRIP.AUTO-MCNC: 109 MG/DL (ref 70–110)
GLUCOSE BLD STRIP.AUTO-MCNC: 113 MG/DL (ref 70–110)
GLUCOSE BLD STRIP.AUTO-MCNC: 89 MG/DL (ref 70–110)

## 2017-01-08 PROCEDURE — 74011250636 HC RX REV CODE- 250/636: Performed by: FAMILY MEDICINE

## 2017-01-08 PROCEDURE — 74011250637 HC RX REV CODE- 250/637: Performed by: HOSPITALIST

## 2017-01-08 PROCEDURE — 82962 GLUCOSE BLOOD TEST: CPT

## 2017-01-08 PROCEDURE — 65270000029 HC RM PRIVATE

## 2017-01-08 PROCEDURE — 97530 THERAPEUTIC ACTIVITIES: CPT

## 2017-01-08 PROCEDURE — 74011250637 HC RX REV CODE- 250/637: Performed by: FAMILY MEDICINE

## 2017-01-08 PROCEDURE — 74011250637 HC RX REV CODE- 250/637: Performed by: INTERNAL MEDICINE

## 2017-01-08 PROCEDURE — 77010033678 HC OXYGEN DAILY

## 2017-01-08 PROCEDURE — 97110 THERAPEUTIC EXERCISES: CPT

## 2017-01-08 RX ADMIN — ROPINIROLE 2 MG: 1 TABLET, FILM COATED ORAL at 00:39

## 2017-01-08 RX ADMIN — HYDROMORPHONE HYDROCHLORIDE 0.5 MG: 1 INJECTION, SOLUTION INTRAMUSCULAR; INTRAVENOUS; SUBCUTANEOUS at 08:13

## 2017-01-08 RX ADMIN — LEVOFLOXACIN 500 MG: 5 INJECTION, SOLUTION INTRAVENOUS at 00:41

## 2017-01-08 RX ADMIN — GUAIFENESIN 600 MG: 600 TABLET, EXTENDED RELEASE ORAL at 08:11

## 2017-01-08 RX ADMIN — TRAZODONE HYDROCHLORIDE 100 MG: 100 TABLET, FILM COATED ORAL at 02:00

## 2017-01-08 RX ADMIN — GABAPENTIN 600 MG: 300 CAPSULE ORAL at 21:54

## 2017-01-08 RX ADMIN — DOCUSATE SODIUM 100 MG: 100 CAPSULE, LIQUID FILLED ORAL at 08:11

## 2017-01-08 RX ADMIN — HYDROMORPHONE HYDROCHLORIDE 0.5 MG: 1 INJECTION, SOLUTION INTRAMUSCULAR; INTRAVENOUS; SUBCUTANEOUS at 16:27

## 2017-01-08 RX ADMIN — HYDROCODONE BITARTRATE AND ACETAMINOPHEN 1 TABLET: 7.5; 325 TABLET ORAL at 17:58

## 2017-01-08 RX ADMIN — ROPINIROLE 1 MG: 1 TABLET, FILM COATED ORAL at 17:22

## 2017-01-08 RX ADMIN — LORATADINE 10 MG: 10 TABLET ORAL at 08:11

## 2017-01-08 RX ADMIN — TOLTERODINE TARTRATE 2 MG: 2 TABLET, FILM COATED ORAL at 21:54

## 2017-01-08 RX ADMIN — HYDROMORPHONE HYDROCHLORIDE 0.5 MG: 1 INJECTION, SOLUTION INTRAMUSCULAR; INTRAVENOUS; SUBCUTANEOUS at 04:54

## 2017-01-08 RX ADMIN — GABAPENTIN 300 MG: 300 CAPSULE ORAL at 17:23

## 2017-01-08 RX ADMIN — GABAPENTIN 600 MG: 300 CAPSULE ORAL at 00:41

## 2017-01-08 RX ADMIN — FLUTICASONE PROPIONATE AND SALMETEROL 1 PUFF: 50; 250 POWDER RESPIRATORY (INHALATION) at 21:54

## 2017-01-08 RX ADMIN — FUROSEMIDE 40 MG: 40 TABLET ORAL at 08:11

## 2017-01-08 RX ADMIN — TRAZODONE HYDROCHLORIDE 100 MG: 100 TABLET, FILM COATED ORAL at 23:53

## 2017-01-08 RX ADMIN — GUAIFENESIN 600 MG: 600 TABLET, EXTENDED RELEASE ORAL at 21:54

## 2017-01-08 RX ADMIN — HYDROMORPHONE HYDROCHLORIDE 0.5 MG: 1 INJECTION, SOLUTION INTRAMUSCULAR; INTRAVENOUS; SUBCUTANEOUS at 12:16

## 2017-01-08 RX ADMIN — TOLTERODINE TARTRATE 2 MG: 2 TABLET, FILM COATED ORAL at 08:11

## 2017-01-08 RX ADMIN — FLUTICASONE PROPIONATE AND SALMETEROL 1 PUFF: 50; 250 POWDER RESPIRATORY (INHALATION) at 09:46

## 2017-01-08 RX ADMIN — LEVOFLOXACIN 500 MG: 5 INJECTION, SOLUTION INTRAVENOUS at 23:53

## 2017-01-08 RX ADMIN — NYSTATIN: 100000 POWDER TOPICAL at 09:46

## 2017-01-08 RX ADMIN — HYDROMORPHONE HYDROCHLORIDE 0.5 MG: 1 INJECTION, SOLUTION INTRAMUSCULAR; INTRAVENOUS; SUBCUTANEOUS at 21:54

## 2017-01-08 RX ADMIN — ROPINIROLE 2 MG: 1 TABLET, FILM COATED ORAL at 23:53

## 2017-01-08 RX ADMIN — POTASSIUM CHLORIDE 20 MEQ: 20 TABLET, EXTENDED RELEASE ORAL at 08:11

## 2017-01-08 RX ADMIN — NYSTATIN: 100000 POWDER TOPICAL at 21:54

## 2017-01-08 RX ADMIN — FLUTICASONE PROPIONATE 2 SPRAY: 50 SPRAY, METERED NASAL at 09:46

## 2017-01-08 RX ADMIN — HYDROCODONE BITARTRATE AND ACETAMINOPHEN 1 TABLET: 7.5; 325 TABLET ORAL at 09:43

## 2017-01-08 NOTE — WOUND CARE
Pt seen by wound care for consult for wound to posterior right thigh. Pt states she believes this wound occurred during her fall. This wound was not previously noted during last wound care assessment. Kathryn dressing applied. Wound care will continue to follow pt per protocol.

## 2017-01-08 NOTE — ROUTINE PROCESS
Bedside and Verbal shift change report given to Angeli Finley RN (oncoming nurse) by Leia Roberson RN  (offgoing nurse). Report included the following information SBAR, Kardex, Intake/Output and MAR.

## 2017-01-08 NOTE — PROGRESS NOTES
Cardiology Progress Note      1/8/2017 1:15 PM    Admit Date: 1/3/2017    Admit Diagnosis: fall  SHOULDER FRACTURE  Frequent falls      Subjective:     Priscilla Alvarez denies chest pain, chest pressure/discomfort, dyspnea, palpitations.     Visit Vitals    /89 (BP 1 Location: Right leg)    Pulse 97    Temp 98 °F (36.7 °C)    Resp 20    Ht 5' 3\" (1.6 m)    Wt 137.8 kg (303 lb 12.8 oz)    SpO2 93%    BMI 53.82 kg/m2     Current Facility-Administered Medications   Medication Dose Route Frequency    guaiFENesin SR (MUCINEX) tablet 600 mg  600 mg Oral Q12H    docusate sodium (COLACE) capsule 100 mg  100 mg Oral DAILY    nystatin (MYCOSTATIN) 100,000 unit/gram powder   Topical BID    furosemide (LASIX) tablet 40 mg  40 mg Oral DAILY    rOPINIRole (REQUIP) tablet 2 mg  2 mg Oral QHS    insulin lispro (HUMALOG) injection   SubCUTAneous AC&HS    glucose chewable tablet 16 g  4 Tab Oral PRN    glucagon (GLUCAGEN) injection 1 mg  1 mg IntraMUSCular PRN    dextrose (D50W) injection syrg 12.5-25 g  25-50 mL IntraVENous PRN    gabapentin (NEURONTIN) capsule 300 mg  300 mg Oral QPM    gabapentin (NEURONTIN) capsule 600 mg  600 mg Oral QHS    rOPINIRole (REQUIP) tablet 1 mg  1 mg Oral QPM    traZODone (DESYREL) tablet 100 mg  100 mg Oral QHS PRN    potassium chloride (K-DUR, KLOR-CON) SR tablet 20 mEq  20 mEq Oral DAILY    clindamycin phosphate 900 mg in 0.9% sodium chloride (MBP/ADV) 100 mL ADV  900 mg IntraVENous ON CALL TO OR    Freedom zamora(AC),Tet Vac-PF (BOOSTRIX) suspension 0.5 mL  0.5 mL IntraMUSCular PRIOR TO DISCHARGE    HYDROcodone-acetaminophen (NORCO) 7.5-325 mg per tablet 1 Tab  1 Tab Oral Q6H PRN    fluticasone-salmeterol (ADVAIR) 250mcg-50mcg/puff  1 Puff Inhalation BID    fluticasone (FLONASE) 50 mcg/actuation nasal spray 2 Spray  2 Spray Both Nostrils DAILY    tolterodine (DETROL) tablet 2 mg  2 mg Oral BID    loratadine (CLARITIN) tablet 10 mg  10 mg Oral DAILY    HYDROmorphone (PF) (DILAUDID) injection 0.5 mg  0.5 mg IntraVENous Q4H PRN    albuterol-ipratropium (DUO-NEB) 2.5 MG-0.5 MG/3 ML  3 mL Nebulization Q4H PRN    levoFLOXacin (LEVAQUIN) 500 mg in D5W IVPB  500 mg IntraVENous Q24H         Objective:      Physical Exam:  Visit Vitals    /89 (BP 1 Location: Right leg)    Pulse 97    Temp 98 °F (36.7 °C)    Resp 20    Ht 5' 3\" (1.6 m)    Wt 137.8 kg (303 lb 12.8 oz)    SpO2 93%    BMI 53.82 kg/m2     General Appearance:  Well developed, well nourished,alert and oriented x 3, and individual in no acute distress. Ears/Nose/Mouth/Throat:   Hearing grossly normal.         Neck: Supple. Chest:   Lungs clear to auscultation bilaterally. Cardiovascular:  Regular rate and rhythm, S1, S2 normal, no murmur. Abdomen:   Soft, non-tender, bowel sounds are active. Extremities: No edema bilaterally. Skin: Warm and dry. Data Review:   Labs:    Recent Results (from the past 24 hour(s))   GLUCOSE, POC    Collection Time: 01/07/17  4:43 PM   Result Value Ref Range    Glucose (POC) 103 70 - 110 mg/dL   GLUCOSE, POC    Collection Time: 01/07/17  9:19 PM   Result Value Ref Range    Glucose (POC) 111 (H) 70 - 110 mg/dL   GLUCOSE, POC    Collection Time: 01/08/17  7:49 AM   Result Value Ref Range    Glucose (POC) 89 70 - 110 mg/dL   GLUCOSE, POC    Collection Time: 01/08/17 12:04 PM   Result Value Ref Range    Glucose (POC) 113 (H) 70 - 110 mg/dL       Telemetry: normal sinus rhythm      Assessment:     Active Problems: Morbid obesity with BMI of 50.0-59.9, adult ([address] (9/21/2016)      Frequent falls (1/4/2017)      UTI (urinary tract infection) (1/4/2017)      Elevated LFTs (1/4/2017)      Fracture of left humerus (1/4/2017)      Hypoxia (1/4/2017)      Immobility (1/4/2017)        Plan: On for surgery on Monday. Follow.      Caleb Delgado MD

## 2017-01-08 NOTE — PROGRESS NOTES
Problem: Mobility Impaired (Adult and Pediatric)  Goal: *Acute Goals and Plan of Care (Insert Text)  Physical Therapy Goals  Initiated 1/5/2017 to be met within 2 days  STGs:  1. Rolling toward right with min/mod assist; Supine to/from supine with min assist in prep for ADL activity. 2. Tolerated sitting EOB 10+min for ADL activity. Outcome: Progressing Towards Goal  PHYSICAL THERAPY TREATMENT     Patient: Yecenia Roblero (30 y.o. female)  Date: 1/8/2017  Diagnosis: fall  SHOULDER FRACTURE  Frequent falls <principal problem not specified>  Procedure(s) (LRB):  REVERSE LEFT TOTAL SHOULDER ARTHROPLASTY, PATIENT IS IN ROOM 327 (Left)    Precautions: Fall   Chart, physical therapy assessment, plan of care and goals were reviewed. ASSESSMENT:  Patient found supine in bed willing to work with PT. Pt requires less A this tx than previous txs. Pt sat at EOB and performed LE therex and scooted toward HOB with SBA. Still no clarification of WB in regards to right toe. Pt does state that pushing toe on floor increases pain, but it hurts less than it did days ago. Pt with good recollection of all supine therex. Pt requires Max A X 2 to scoot to left in bed, but pt does well with sit to supine and does not need to be scooted up. Pt left with needs in reach. Pt to have surgery to left shoulder tomorrow at noon. Education: therex on own, bed mobility. Progression toward goals:  [ ]      Improving appropriately and progressing toward goals  [X]      Improving slowly and progressing toward goals  [ ]      Not making progress toward goals and plan of care will be adjusted       PLAN:  Patient continues to benefit from skilled intervention to address the above impairments. Continue treatment per established plan of care. Discharge Recommendations:  TBD post surgical intervention.   Further Equipment Recommendations for Discharge:  TBD       SUBJECTIVE:   Patient stated It actually hurts less to roll to my left side.      OBJECTIVE DATA SUMMARY:   Critical Behavior:  Neurologic State: Alert  Orientation Level: Oriented X4  Cognition: Appropriate decision making, Appropriate for age attention/concentration, Appropriate safety awareness, Follows commands  Safety/Judgement: Fall prevention  Functional Mobility Training:  Bed Mobility:  Supine to Sit: Minimum assistance  Sit to Supine: Contact guard assistance;Minimum assistance  Scooting: Stand-by asssistance (toward [facility])  Balance:  Sitting: Intact  Therapeutic Exercises:   LAQ, seated marches, ankle pumps X 10 Hip abd/add, heel slides, SLR X 5 bilaterally. Pain:  Pre tx pain: 8  Post tx pain: 8  Pain Scale 1: Numeric (0 - 10)  Pain Intensity 1: 8  Pain Location 1: Shoulder  Pain Orientation 1: Left  Activity Tolerance:   Fair  Please refer to the flowsheet for vital signs taken during this treatment.   After treatment:   [ ] Patient left in no apparent distress sitting up in chair  [X] Patient left in no apparent distress in bed  [X] Call bell left within reach  [ ] Nursing notified  [ ] Caregiver present  [ ] Bed alarm activated      Seamus Mcguire PTA   Time Calculation: 25 mins

## 2017-01-08 NOTE — ROUTINE PROCESS
Bedside and Verbal shift change report given to E. Mervin Brunner (oncoming nurse) by Leila Bill   (offgoing nurse). Report included the following information SBAR, Kardex, Intake/Output, MAR, Recent Results and Med Rec Status.

## 2017-01-08 NOTE — PROGRESS NOTES
Hospitalist Progress Note    Patient: Sarah Rivera MRN: [id number]  CSN: [id number]    YOB: 1950  Age: 61 y.o. Sex: female    DOA: 1/3/2017 LOS:  LOS: 4 days          Chief Complaint: UTI, Bronchitis, CIFUENTES    Assessment/Plan     Patient Active Problem List   Diagnosis Code    S/P gastric bypass Z98.84    NAFLD (nonalcoholic fatty liver disease) K76.0    Morbid obesity with BMI of 50.0-59.9, adult ([facility] [address] E66.01, Z68.43    Frequent falls R29.6    UTI (urinary tract infection) N39.0    Elevated LFTs R79.89    Fracture of left humerus S42.302A    Hypoxia R09.02    Immobility Z74.09     Bronchitis; Improved but persistent congestion, repeat CXR; New right basilar consolidation developing since the prior study. Increasing bibasilar atelectasis. On levaquin. Echo extremely limited evaluation. As per cardiology; No increased cardiac risk for surgery and patient is cleared for surgery. On Levaquin for UTI  Has aguero for now as immobilized in bed  For shoulder replacement as fx has worsened and is displaced-she is not a candidate for outpatient treatment considering her comorbidities and requirement for surgical intervention for arm/shoulder. Possible shoulder surgery tomorrow. F/u with Dr. Norris Wray  DVT prophylaxis- SCDs            Subjective:  No acute events over night. Pain well controlled. Review of systems:    Constitutional: denies fevers, chills, myalgias  Respiratory: denies SOB, cough  Cardiovascular: denies chest pain, palpitations  Gastrointestinal: denies nausea, vomiting, diarrhea      Vital signs/Intake and Output:  Visit Vitals    /89 (BP 1 Location: Right leg)    Pulse 97    Temp 98 °F (36.7 °C)    Resp 20    Ht 5' 3\" (1.6 m)    Wt 137.8 kg (303 lb 12.8 oz)    SpO2 93%    BMI 53.82 kg/m2     Current Shift:  01/08 0701 - 01/08 1900  In: -   Out: 375 [Urine:375]  Last three shifts:  01/06 1901 - 01/08 0700  In: 340 [P.O.:240;  I.V.:100]  Out: 1050 [ODOIN:8360]    Exam:    General: Well developed, alert, NAD, OX3  Head/Neck: NCAT, supple, No masses, No lymphadenopathy  CVS:Regular rate and rhythm, no M/R/G, S1/S2 heard, no thrill  Lungs:Clear to auscultation bilaterally, no wheezes, rhonchi, or rales  Abdomen: Soft, Nontender, No distention, Normal Bowel sounds, No hepatomegaly  Extremities: left arm in sling. 2+ LE edema bilaterally, pulses palpable 2+  Skin:normal texture and turgor, no rashes, no lesions  Neuro:grossly normal , follows commands  Psych:appropriate                Labs: Results:       Chemistry Recent Labs      01/06/17   0320   GLU  120*   NA  141   K  3.7   CL  102   CO2  33*   BUN  7   CREA  0.63   CA  8.4*   AGAP  6   BUCR  11*   AP  186*   TP  6.2*   ALB  2.1*   GLOB  4.1*   AGRAT  0.5*      CBC w/Diff Recent Labs      01/06/17 0320   WBC  8.7   RBC  4.19*   HGB  11.9*   HCT  38.0   PLT  181   GRANS  66   LYMPH  23   EOS  1      Cardiac Enzymes No results for input(s): CPK, CKND1, YESSENIA in the last 72 hours. No lab exists for component: CKRMB, TROIP   Coagulation No results for input(s): PTP, INR, APTT in the last 72 hours. No lab exists for component: INREXT    Lipid Panel No results found for: CHOL, CHOLPOCT, CHOLX, CHLST, CHOLV, [id number], HDL, LDL, NLDLCT, DLDL, LDLC, DLDLP, [id number], VLDLC, VLDL, TGL, TGLX, TRIGL, [id number], TRIGP, TGLPOCT, [id number], CHHD, CHHDX   BNP No results for input(s): BNPP in the last 72 hours.    Liver Enzymes Recent Labs      01/06/17   0320   TP  6.2*   ALB  2.1*   AP  186*   SGOT  474*      Thyroid Studies No results found for: T4, T3U, TSH, TSHEXT     Procedures/imaging: see electronic medical records for all procedures/Xrays and details which were not copied into this note but were reviewed prior to creation of Vandana Baca MD

## 2017-01-08 NOTE — PROGRESS NOTES
SHIFT SUMMARY: No events. Pt request for pain medication dilaudid and norco, but is able to sleep afterwards. She states she hasn't had a BM since admission, and that she would like to start her antidepressants. She is willing to work with PT for evaluation in order to use bedside commode.

## 2017-01-09 ENCOUNTER — APPOINTMENT (OUTPATIENT)
Dept: GENERAL RADIOLOGY | Age: 67
DRG: 483 | End: 2017-01-09
Attending: HOSPITALIST
Payer: MEDICARE

## 2017-01-09 ENCOUNTER — APPOINTMENT (OUTPATIENT)
Dept: GENERAL RADIOLOGY | Age: 67
DRG: 483 | End: 2017-01-09
Attending: SPECIALIST
Payer: MEDICARE

## 2017-01-09 ENCOUNTER — APPOINTMENT (OUTPATIENT)
Dept: GENERAL RADIOLOGY | Age: 67
DRG: 483 | End: 2017-01-09
Attending: ORTHOPAEDIC SURGERY
Payer: MEDICARE

## 2017-01-09 LAB
ALBUMIN SERPL BCP-MCNC: 2.1 G/DL (ref 3.4–5)
ALBUMIN/GLOB SERPL: 0.5 {RATIO} (ref 0.8–1.7)
ALP SERPL-CCNC: 167 U/L (ref 45–117)
ALT SERPL-CCNC: 122 U/L (ref 13–56)
ANION GAP BLD CALC-SCNC: 6 MMOL/L (ref 3–18)
AST SERPL W P-5'-P-CCNC: 102 U/L (ref 15–37)
BASOPHILS # BLD AUTO: 0.1 K/UL (ref 0–0.06)
BASOPHILS # BLD: 1 % (ref 0–2)
BILIRUB SERPL-MCNC: 0.6 MG/DL (ref 0.2–1)
BUN SERPL-MCNC: 17 MG/DL (ref 7–18)
BUN/CREAT SERPL: 22 (ref 12–20)
CALCIUM SERPL-MCNC: 8.8 MG/DL (ref 8.5–10.1)
CHLORIDE SERPL-SCNC: 101 MMOL/L (ref 100–108)
CO2 SERPL-SCNC: 32 MMOL/L (ref 21–32)
CREAT SERPL-MCNC: 0.77 MG/DL (ref 0.6–1.3)
DIFFERENTIAL METHOD BLD: ABNORMAL
EOSINOPHIL # BLD: 0.1 K/UL (ref 0–0.4)
EOSINOPHIL NFR BLD: 1 % (ref 0–5)
ERYTHROCYTE [DISTWIDTH] IN BLOOD BY AUTOMATED COUNT: 14.7 % (ref 11.6–14.5)
GLOBULIN SER CALC-MCNC: 4.6 G/DL (ref 2–4)
GLUCOSE BLD STRIP.AUTO-MCNC: 101 MG/DL (ref 70–110)
GLUCOSE BLD STRIP.AUTO-MCNC: 102 MG/DL (ref 70–110)
GLUCOSE BLD STRIP.AUTO-MCNC: 136 MG/DL (ref 70–110)
GLUCOSE BLD STRIP.AUTO-MCNC: 169 MG/DL (ref 70–110)
GLUCOSE SERPL-MCNC: 109 MG/DL (ref 74–99)
HCT VFR BLD AUTO: 40.5 % (ref 35–45)
HGB BLD-MCNC: 12.7 G/DL (ref 12–16)
LYMPHOCYTES # BLD AUTO: 26 % (ref 21–52)
LYMPHOCYTES # BLD: 2.1 K/UL (ref 0.9–3.6)
MCH RBC QN AUTO: 28.3 PG (ref 24–34)
MCHC RBC AUTO-ENTMCNC: 31.4 G/DL (ref 31–37)
MCV RBC AUTO: 90.4 FL (ref 74–97)
MONOCYTES # BLD: 1 K/UL (ref 0.05–1.2)
MONOCYTES NFR BLD AUTO: 12 % (ref 3–10)
NEUTS SEG # BLD: 4.8 K/UL (ref 1.8–8)
NEUTS SEG NFR BLD AUTO: 60 % (ref 40–73)
PLATELET # BLD AUTO: 245 K/UL (ref 135–420)
PMV BLD AUTO: 11.4 FL (ref 9.2–11.8)
POTASSIUM SERPL-SCNC: 4.5 MMOL/L (ref 3.5–5.5)
PROT SERPL-MCNC: 6.7 G/DL (ref 6.4–8.2)
RBC # BLD AUTO: 4.48 M/UL (ref 4.2–5.3)
SODIUM SERPL-SCNC: 139 MMOL/L (ref 136–145)
WBC # BLD AUTO: 8.1 K/UL (ref 4.6–13.2)

## 2017-01-09 PROCEDURE — 77030031139 HC SUT VCRL2 J&J -A: Performed by: ORTHOPAEDIC SURGERY

## 2017-01-09 PROCEDURE — 64415 NJX AA&/STRD BRCH PLXS IMG: CPT | Performed by: ORTHOPAEDIC SURGERY

## 2017-01-09 PROCEDURE — 0RRK00Z REPLACEMENT OF LEFT SHOULDER JOINT WITH REVERSE BALL AND SOCKET SYNTHETIC SUBSTITUTE, OPEN APPROACH: ICD-10-PCS | Performed by: ORTHOPAEDIC SURGERY

## 2017-01-09 PROCEDURE — 77030011640 HC PAD GRND REM COVD -A: Performed by: ORTHOPAEDIC SURGERY

## 2017-01-09 PROCEDURE — 71020 XR CHEST PA LAT: CPT

## 2017-01-09 PROCEDURE — 77030006822 HC BLD SAW SAG BRSM -B: Performed by: ORTHOPAEDIC SURGERY

## 2017-01-09 PROCEDURE — 74011000250 HC RX REV CODE- 250: Performed by: ORTHOPAEDIC SURGERY

## 2017-01-09 PROCEDURE — 77030011256 HC DRSG MEPILEX <16IN NO BORD MOLN -A: Performed by: ORTHOPAEDIC SURGERY

## 2017-01-09 PROCEDURE — 76942 ECHO GUIDE FOR BIOPSY: CPT | Performed by: ORTHOPAEDIC SURGERY

## 2017-01-09 PROCEDURE — 74011250636 HC RX REV CODE- 250/636: Performed by: SPECIALIST

## 2017-01-09 PROCEDURE — 65610000006 HC RM INTENSIVE CARE

## 2017-01-09 PROCEDURE — 77030020256 HC SOL INJ NACL 0.9%  500ML: Performed by: ORTHOPAEDIC SURGERY

## 2017-01-09 PROCEDURE — 74011250637 HC RX REV CODE- 250/637: Performed by: HOSPITALIST

## 2017-01-09 PROCEDURE — 71010 XR CHEST SNGL V: CPT

## 2017-01-09 PROCEDURE — C1776 JOINT DEVICE (IMPLANTABLE): HCPCS | Performed by: ORTHOPAEDIC SURGERY

## 2017-01-09 PROCEDURE — 80053 COMPREHEN METABOLIC PANEL: CPT | Performed by: HOSPITALIST

## 2017-01-09 PROCEDURE — 74011250637 HC RX REV CODE- 250/637: Performed by: FAMILY MEDICINE

## 2017-01-09 PROCEDURE — 74011250636 HC RX REV CODE- 250/636: Performed by: FAMILY MEDICINE

## 2017-01-09 PROCEDURE — 36415 COLL VENOUS BLD VENIPUNCTURE: CPT | Performed by: HOSPITALIST

## 2017-01-09 PROCEDURE — 76060000037 HC ANESTHESIA 3 TO 3.5 HR: Performed by: ORTHOPAEDIC SURGERY

## 2017-01-09 PROCEDURE — 73020 X-RAY EXAM OF SHOULDER: CPT

## 2017-01-09 PROCEDURE — 3E0T3CZ INTRODUCE REGIONAL ANESTH IN PERIPH NRV, PLEXI, PERC: ICD-10-PCS | Performed by: SPECIALIST

## 2017-01-09 PROCEDURE — 77030020255 HC SOL INJ LR 1000ML BG: Performed by: ORTHOPAEDIC SURGERY

## 2017-01-09 PROCEDURE — C1713 ANCHOR/SCREW BN/BN,TIS/BN: HCPCS | Performed by: ORTHOPAEDIC SURGERY

## 2017-01-09 PROCEDURE — 85025 COMPLETE CBC W/AUTO DIFF WBC: CPT | Performed by: HOSPITALIST

## 2017-01-09 PROCEDURE — 77030033138 HC SUT PGA STRATFX J&J -B: Performed by: ORTHOPAEDIC SURGERY

## 2017-01-09 PROCEDURE — 77030013079 HC BLNKT BAIR HGGR 3M -A: Performed by: SPECIALIST

## 2017-01-09 PROCEDURE — 74011000258 HC RX REV CODE- 258

## 2017-01-09 PROCEDURE — 77030008477 HC STYL SATN SLP COVD -A: Performed by: SPECIALIST

## 2017-01-09 PROCEDURE — 74011250636 HC RX REV CODE- 250/636: Performed by: ORTHOPAEDIC SURGERY

## 2017-01-09 PROCEDURE — 74011250636 HC RX REV CODE- 250/636

## 2017-01-09 PROCEDURE — 76210000000 HC OR PH I REC 2 TO 2.5 HR: Performed by: ORTHOPAEDIC SURGERY

## 2017-01-09 PROCEDURE — 82962 GLUCOSE BLOOD TEST: CPT

## 2017-01-09 PROCEDURE — 74011250637 HC RX REV CODE- 250/637: Performed by: INTERNAL MEDICINE

## 2017-01-09 PROCEDURE — 77030002922 HC SUT FBRWRE ARTH -B: Performed by: ORTHOPAEDIC SURGERY

## 2017-01-09 PROCEDURE — 77030011628: Performed by: ORTHOPAEDIC SURGERY

## 2017-01-09 PROCEDURE — 76010000133 HC OR TIME 3 TO 3.5 HR: Performed by: ORTHOPAEDIC SURGERY

## 2017-01-09 PROCEDURE — 77030013728 HC IRR FEM CNL STRY -B: Performed by: ORTHOPAEDIC SURGERY

## 2017-01-09 PROCEDURE — 77030027355 HC HNDPC IRR SURGLAV STRY -B: Performed by: ORTHOPAEDIC SURGERY

## 2017-01-09 PROCEDURE — 77030008683 HC TU ET CUF COVD -A: Performed by: SPECIALIST

## 2017-01-09 PROCEDURE — 74011636637 HC RX REV CODE- 636/637: Performed by: FAMILY MEDICINE

## 2017-01-09 PROCEDURE — 74011250636 HC RX REV CODE- 250/636: Performed by: INTERNAL MEDICINE

## 2017-01-09 PROCEDURE — 77030003806 HC BIT DRL EXAC -E: Performed by: ORTHOPAEDIC SURGERY

## 2017-01-09 PROCEDURE — 74011000250 HC RX REV CODE- 250

## 2017-01-09 PROCEDURE — 77030006643: Performed by: SPECIALIST

## 2017-01-09 PROCEDURE — C1751 CATH, INF, PER/CENT/MIDLINE: HCPCS | Performed by: SPECIALIST

## 2017-01-09 PROCEDURE — 74011000250 HC RX REV CODE- 250: Performed by: FAMILY MEDICINE

## 2017-01-09 PROCEDURE — 94660 CPAP INITIATION&MGMT: CPT

## 2017-01-09 PROCEDURE — 77030018846 HC SOL IRR STRL H20 ICUM -A: Performed by: ORTHOPAEDIC SURGERY

## 2017-01-09 DEVICE — KIT BONE SCR L18MM DIA4.5MM GLEN SHLDR WHT COMPR LCK CAP RVS: Type: IMPLANTABLE DEVICE | Site: SHOULDER | Status: FUNCTIONAL

## 2017-01-09 DEVICE — IMPLANTABLE DEVICE: Type: IMPLANTABLE DEVICE | Site: SHOULDER | Status: FUNCTIONAL

## 2017-01-09 DEVICE — SHOULDER REV IMPL -- S4 BSV SC: Type: IMPLANTABLE DEVICE | Site: SHOULDER | Status: FUNCTIONAL

## 2017-01-09 DEVICE — SCR TORQUE DEFINING KIT -- EQUINOXE: Type: IMPLANTABLE DEVICE | Site: SHOULDER | Status: FUNCTIONAL

## 2017-01-09 DEVICE — KIT BONE SCR L42MM DIA4.5MM GLEN SHLDR YEL COMPR LCK CAP RVS: Type: IMPLANTABLE DEVICE | Site: SHOULDER | Status: FUNCTIONAL

## 2017-01-09 DEVICE — SPHERE GLEN DIA38MM REG STD SHLDR CO CHROM LOK SCR PRI REV: Type: IMPLANTABLE DEVICE | Site: SHOULDER | Status: FUNCTIONAL

## 2017-01-09 DEVICE — KIT BONE SCR L46MM DIA4.5MM GLEN SHLDR PUR COMPR LCK CAP RVS: Type: IMPLANTABLE DEVICE | Site: SHOULDER | Status: FUNCTIONAL

## 2017-01-09 DEVICE — CEMENT BNE 20GM HALF DOSE PMMA VISC RADPQ FAST: Type: IMPLANTABLE DEVICE | Site: SHOULDER | Status: FUNCTIONAL

## 2017-01-09 DEVICE — SCR BNE LCK GLENOSPHERE -- EQUINOXE: Type: IMPLANTABLE DEVICE | Site: SHOULDER | Status: FUNCTIONAL

## 2017-01-09 RX ORDER — SODIUM CHLORIDE 9 MG/ML
125 INJECTION, SOLUTION INTRAVENOUS CONTINUOUS
Status: DISCONTINUED | OUTPATIENT
Start: 2017-01-09 | End: 2017-01-09 | Stop reason: HOSPADM

## 2017-01-09 RX ORDER — SODIUM CHLORIDE 0.9 % (FLUSH) 0.9 %
5-10 SYRINGE (ML) INJECTION AS NEEDED
Status: DISCONTINUED | OUTPATIENT
Start: 2017-01-09 | End: 2017-01-09 | Stop reason: HOSPADM

## 2017-01-09 RX ORDER — ONDANSETRON 2 MG/ML
INJECTION INTRAMUSCULAR; INTRAVENOUS AS NEEDED
Status: DISCONTINUED | OUTPATIENT
Start: 2017-01-09 | End: 2017-01-09 | Stop reason: HOSPADM

## 2017-01-09 RX ORDER — CLINDAMYCIN PHOSPHATE 900 MG/50ML
INJECTION INTRAVENOUS AS NEEDED
Status: DISCONTINUED | OUTPATIENT
Start: 2017-01-09 | End: 2017-01-09 | Stop reason: HOSPADM

## 2017-01-09 RX ORDER — MAGNESIUM SULFATE 100 %
4 CRYSTALS MISCELLANEOUS AS NEEDED
Status: DISCONTINUED | OUTPATIENT
Start: 2017-01-09 | End: 2017-01-09 | Stop reason: HOSPADM

## 2017-01-09 RX ORDER — BUPIVACAINE HYDROCHLORIDE AND EPINEPHRINE 5; 5 MG/ML; UG/ML
INJECTION, SOLUTION EPIDURAL; INTRACAUDAL; PERINEURAL AS NEEDED
Status: DISCONTINUED | OUTPATIENT
Start: 2017-01-09 | End: 2017-01-09 | Stop reason: HOSPADM

## 2017-01-09 RX ORDER — FENTANYL CITRATE 50 UG/ML
INJECTION, SOLUTION INTRAMUSCULAR; INTRAVENOUS AS NEEDED
Status: DISCONTINUED | OUTPATIENT
Start: 2017-01-09 | End: 2017-01-09 | Stop reason: HOSPADM

## 2017-01-09 RX ORDER — INSULIN LISPRO 100 [IU]/ML
INJECTION, SOLUTION INTRAVENOUS; SUBCUTANEOUS ONCE
Status: DISCONTINUED | OUTPATIENT
Start: 2017-01-09 | End: 2017-01-09 | Stop reason: HOSPADM

## 2017-01-09 RX ORDER — POLYETHYLENE GLYCOL 3350 17 G/17G
17 POWDER, FOR SOLUTION ORAL DAILY
Status: DISCONTINUED | OUTPATIENT
Start: 2017-01-09 | End: 2017-01-13 | Stop reason: HOSPADM

## 2017-01-09 RX ORDER — NEOSTIGMINE METHYLSULFATE 5 MG/5 ML
SYRINGE (ML) INTRAVENOUS AS NEEDED
Status: DISCONTINUED | OUTPATIENT
Start: 2017-01-09 | End: 2017-01-09 | Stop reason: HOSPADM

## 2017-01-09 RX ORDER — FENTANYL CITRATE 50 UG/ML
25 INJECTION, SOLUTION INTRAMUSCULAR; INTRAVENOUS AS NEEDED
Status: DISCONTINUED | OUTPATIENT
Start: 2017-01-09 | End: 2017-01-09 | Stop reason: HOSPADM

## 2017-01-09 RX ORDER — PROPOFOL 10 MG/ML
INJECTION, EMULSION INTRAVENOUS AS NEEDED
Status: DISCONTINUED | OUTPATIENT
Start: 2017-01-09 | End: 2017-01-09 | Stop reason: HOSPADM

## 2017-01-09 RX ORDER — ONDANSETRON 2 MG/ML
4 INJECTION INTRAMUSCULAR; INTRAVENOUS ONCE
Status: DISCONTINUED | OUTPATIENT
Start: 2017-01-09 | End: 2017-01-09 | Stop reason: HOSPADM

## 2017-01-09 RX ORDER — LIDOCAINE HYDROCHLORIDE 20 MG/ML
INJECTION, SOLUTION EPIDURAL; INFILTRATION; INTRACAUDAL; PERINEURAL AS NEEDED
Status: DISCONTINUED | OUTPATIENT
Start: 2017-01-09 | End: 2017-01-09 | Stop reason: HOSPADM

## 2017-01-09 RX ORDER — KETAMINE HYDROCHLORIDE 10 MG/ML
INJECTION, SOLUTION INTRAMUSCULAR; INTRAVENOUS AS NEEDED
Status: DISCONTINUED | OUTPATIENT
Start: 2017-01-09 | End: 2017-01-09 | Stop reason: HOSPADM

## 2017-01-09 RX ORDER — NALOXONE HYDROCHLORIDE 0.4 MG/ML
0.1 INJECTION, SOLUTION INTRAMUSCULAR; INTRAVENOUS; SUBCUTANEOUS AS NEEDED
Status: DISCONTINUED | OUTPATIENT
Start: 2017-01-09 | End: 2017-01-09 | Stop reason: HOSPADM

## 2017-01-09 RX ORDER — SERTRALINE HYDROCHLORIDE 50 MG/1
100 TABLET, FILM COATED ORAL DAILY
Status: DISCONTINUED | OUTPATIENT
Start: 2017-01-09 | End: 2017-01-13 | Stop reason: HOSPADM

## 2017-01-09 RX ORDER — GLYCOPYRROLATE 0.2 MG/ML
INJECTION INTRAMUSCULAR; INTRAVENOUS AS NEEDED
Status: DISCONTINUED | OUTPATIENT
Start: 2017-01-09 | End: 2017-01-09 | Stop reason: HOSPADM

## 2017-01-09 RX ORDER — DEXTROSE 50 % IN WATER (D50W) INTRAVENOUS SYRINGE
25-50 AS NEEDED
Status: DISCONTINUED | OUTPATIENT
Start: 2017-01-09 | End: 2017-01-09 | Stop reason: HOSPADM

## 2017-01-09 RX ORDER — MIDAZOLAM HYDROCHLORIDE 1 MG/ML
INJECTION, SOLUTION INTRAMUSCULAR; INTRAVENOUS AS NEEDED
Status: DISCONTINUED | OUTPATIENT
Start: 2017-01-09 | End: 2017-01-09 | Stop reason: HOSPADM

## 2017-01-09 RX ORDER — PANTOPRAZOLE SODIUM 40 MG/1
40 TABLET, DELAYED RELEASE ORAL DAILY
Status: DISCONTINUED | OUTPATIENT
Start: 2017-01-09 | End: 2017-01-13 | Stop reason: HOSPADM

## 2017-01-09 RX ORDER — SODIUM CHLORIDE, SODIUM LACTATE, POTASSIUM CHLORIDE, CALCIUM CHLORIDE 600; 310; 30; 20 MG/100ML; MG/100ML; MG/100ML; MG/100ML
1000 INJECTION, SOLUTION INTRAVENOUS CONTINUOUS
Status: DISCONTINUED | OUTPATIENT
Start: 2017-01-09 | End: 2017-01-11

## 2017-01-09 RX ORDER — ROCURONIUM BROMIDE 10 MG/ML
INJECTION, SOLUTION INTRAVENOUS AS NEEDED
Status: DISCONTINUED | OUTPATIENT
Start: 2017-01-09 | End: 2017-01-09 | Stop reason: HOSPADM

## 2017-01-09 RX ORDER — HYDROMORPHONE HYDROCHLORIDE 2 MG/ML
0.2 INJECTION, SOLUTION INTRAMUSCULAR; INTRAVENOUS; SUBCUTANEOUS
Status: DISCONTINUED | OUTPATIENT
Start: 2017-01-09 | End: 2017-01-09 | Stop reason: HOSPADM

## 2017-01-09 RX ORDER — BUSPIRONE HYDROCHLORIDE 5 MG/1
10 TABLET ORAL 2 TIMES DAILY
Status: DISCONTINUED | OUTPATIENT
Start: 2017-01-09 | End: 2017-01-13 | Stop reason: HOSPADM

## 2017-01-09 RX ADMIN — IPRATROPIUM BROMIDE AND ALBUTEROL SULFATE 3 ML: .5; 3 SOLUTION RESPIRATORY (INHALATION) at 13:03

## 2017-01-09 RX ADMIN — BUSPIRONE HYDROCHLORIDE 10 MG: 5 TABLET ORAL at 22:29

## 2017-01-09 RX ADMIN — CLINDAMYCIN PHOSPHATE 900 MG: 900 INJECTION INTRAVENOUS at 13:30

## 2017-01-09 RX ADMIN — ROCURONIUM BROMIDE 50 MG: 10 INJECTION, SOLUTION INTRAVENOUS at 13:24

## 2017-01-09 RX ADMIN — SERTRALINE HYDROCHLORIDE 100 MG: 50 TABLET ORAL at 11:02

## 2017-01-09 RX ADMIN — GUAIFENESIN 600 MG: 600 TABLET, EXTENDED RELEASE ORAL at 22:29

## 2017-01-09 RX ADMIN — FENTANYL CITRATE 25 MCG: 50 INJECTION, SOLUTION INTRAMUSCULAR; INTRAVENOUS at 17:05

## 2017-01-09 RX ADMIN — LIDOCAINE HYDROCHLORIDE 100 MG: 20 INJECTION, SOLUTION EPIDURAL; INFILTRATION; INTRACAUDAL; PERINEURAL at 13:24

## 2017-01-09 RX ADMIN — BUSPIRONE HYDROCHLORIDE 10 MG: 5 TABLET ORAL at 11:02

## 2017-01-09 RX ADMIN — PROPOFOL 100 MG: 10 INJECTION, EMULSION INTRAVENOUS at 13:24

## 2017-01-09 RX ADMIN — ROCURONIUM BROMIDE 20 MG: 10 INJECTION, SOLUTION INTRAVENOUS at 13:45

## 2017-01-09 RX ADMIN — FLUTICASONE PROPIONATE AND SALMETEROL 1 PUFF: 50; 250 POWDER RESPIRATORY (INHALATION) at 11:03

## 2017-01-09 RX ADMIN — ROCURONIUM BROMIDE 20 MG: 10 INJECTION, SOLUTION INTRAVENOUS at 15:22

## 2017-01-09 RX ADMIN — FENTANYL CITRATE 50 MCG: 50 INJECTION, SOLUTION INTRAMUSCULAR; INTRAVENOUS at 14:02

## 2017-01-09 RX ADMIN — HYDROMORPHONE HYDROCHLORIDE: 10 INJECTION, SOLUTION INTRAMUSCULAR; INTRAVENOUS; SUBCUTANEOUS at 17:17

## 2017-01-09 RX ADMIN — HYDROMORPHONE HYDROCHLORIDE 0.5 MG: 1 INJECTION, SOLUTION INTRAMUSCULAR; INTRAVENOUS; SUBCUTANEOUS at 04:57

## 2017-01-09 RX ADMIN — FLUTICASONE PROPIONATE AND SALMETEROL 1 PUFF: 50; 250 POWDER RESPIRATORY (INHALATION) at 22:39

## 2017-01-09 RX ADMIN — ONDANSETRON 4 MG: 2 INJECTION INTRAMUSCULAR; INTRAVENOUS at 14:31

## 2017-01-09 RX ADMIN — FENTANYL CITRATE 50 MCG: 50 INJECTION, SOLUTION INTRAMUSCULAR; INTRAVENOUS at 14:04

## 2017-01-09 RX ADMIN — SODIUM CHLORIDE, SODIUM LACTATE, POTASSIUM CHLORIDE, AND CALCIUM CHLORIDE: 600; 310; 30; 20 INJECTION, SOLUTION INTRAVENOUS at 16:37

## 2017-01-09 RX ADMIN — GABAPENTIN 600 MG: 300 CAPSULE ORAL at 22:29

## 2017-01-09 RX ADMIN — SODIUM CHLORIDE, SODIUM LACTATE, POTASSIUM CHLORIDE, AND CALCIUM CHLORIDE 1000 ML: 600; 310; 30; 20 INJECTION, SOLUTION INTRAVENOUS at 13:10

## 2017-01-09 RX ADMIN — ROCURONIUM BROMIDE 10 MG: 10 INJECTION, SOLUTION INTRAVENOUS at 14:46

## 2017-01-09 RX ADMIN — FENTANYL CITRATE 25 MCG: 50 INJECTION, SOLUTION INTRAMUSCULAR; INTRAVENOUS at 17:11

## 2017-01-09 RX ADMIN — FENTANYL CITRATE 25 MCG: 50 INJECTION, SOLUTION INTRAMUSCULAR; INTRAVENOUS at 16:54

## 2017-01-09 RX ADMIN — METHYLPREDNISOLONE SODIUM SUCCINATE 40 MG: 40 INJECTION, POWDER, FOR SOLUTION INTRAMUSCULAR; INTRAVENOUS at 11:06

## 2017-01-09 RX ADMIN — INSULIN LISPRO 3 UNITS: 100 INJECTION, SOLUTION INTRAVENOUS; SUBCUTANEOUS at 17:55

## 2017-01-09 RX ADMIN — ROCURONIUM BROMIDE 20 MG: 10 INJECTION, SOLUTION INTRAVENOUS at 14:13

## 2017-01-09 RX ADMIN — FENTANYL CITRATE 50 MCG: 50 INJECTION, SOLUTION INTRAMUSCULAR; INTRAVENOUS at 14:32

## 2017-01-09 RX ADMIN — FENTANYL CITRATE 25 MCG: 50 INJECTION, SOLUTION INTRAMUSCULAR; INTRAVENOUS at 17:00

## 2017-01-09 RX ADMIN — GLYCOPYRROLATE 0.8 MG: 0.2 INJECTION INTRAMUSCULAR; INTRAVENOUS at 16:14

## 2017-01-09 RX ADMIN — TOLTERODINE TARTRATE 2 MG: 2 TABLET, FILM COATED ORAL at 22:29

## 2017-01-09 RX ADMIN — ROCURONIUM BROMIDE 10 MG: 10 INJECTION, SOLUTION INTRAVENOUS at 15:26

## 2017-01-09 RX ADMIN — Medication 6 MG: at 16:14

## 2017-01-09 RX ADMIN — HYDROCODONE BITARTRATE AND ACETAMINOPHEN 1 TABLET: 7.5; 325 TABLET ORAL at 08:08

## 2017-01-09 RX ADMIN — FENTANYL CITRATE 50 MCG: 50 INJECTION, SOLUTION INTRAMUSCULAR; INTRAVENOUS at 14:19

## 2017-01-09 RX ADMIN — FENTANYL CITRATE 50 MCG: 50 INJECTION, SOLUTION INTRAMUSCULAR; INTRAVENOUS at 15:02

## 2017-01-09 RX ADMIN — NYSTATIN: 100000 POWDER TOPICAL at 22:39

## 2017-01-09 RX ADMIN — NYSTATIN: 100000 POWDER TOPICAL at 11:05

## 2017-01-09 RX ADMIN — KETAMINE HYDROCHLORIDE 50 MG: 10 INJECTION, SOLUTION INTRAMUSCULAR; INTRAVENOUS at 13:23

## 2017-01-09 RX ADMIN — PANTOPRAZOLE SODIUM 40 MG: 40 TABLET, DELAYED RELEASE ORAL at 11:02

## 2017-01-09 RX ADMIN — FENTANYL CITRATE 50 MCG: 50 INJECTION, SOLUTION INTRAMUSCULAR; INTRAVENOUS at 14:25

## 2017-01-09 RX ADMIN — SODIUM CHLORIDE, SODIUM LACTATE, POTASSIUM CHLORIDE, AND CALCIUM CHLORIDE: 600; 310; 30; 20 INJECTION, SOLUTION INTRAVENOUS at 14:41

## 2017-01-09 RX ADMIN — TRAZODONE HYDROCHLORIDE 100 MG: 100 TABLET, FILM COATED ORAL at 22:30

## 2017-01-09 RX ADMIN — FLUTICASONE PROPIONATE 2 SPRAY: 50 SPRAY, METERED NASAL at 11:03

## 2017-01-09 RX ADMIN — MIDAZOLAM HYDROCHLORIDE 2 MG: 1 INJECTION, SOLUTION INTRAMUSCULAR; INTRAVENOUS at 13:17

## 2017-01-09 RX ADMIN — SODIUM PHOSPHATE, DIBASIC AND SODIUM PHOSPHATE, MONOBASIC 118 ML: 7; 19 ENEMA RECTAL at 11:16

## 2017-01-09 NOTE — PROGRESS NOTES
Hospitalist Progress Note    Patient: Romi Silva MRN: [id number]  CSN: [id number]    YOB: 1950  Age: 61 y.o. Sex: female    DOA: 1/3/2017 LOS:  LOS: 5 days          Chief Complaint:    Humerus fracture      Assessment/Plan     Humerus fracture-awaiting surgery-cardio feels med optimized-anesthesia would like pulm eval-repeated CXR-suspect this is mostly atelectasis and needs to use IS, sit up in chair/bed-asked nurse to help her get up-she is on levaquin, no fevers-stable on 2 liters NC o2-at risk for further resp issues after surgery with obesity and type of surgery-but also awaiting surgery further may increase risk for further infection as immobilized in bed  Atelectasis, bronchitis-IS use, on levaquin, NP cough, no fevers-repeat CXR reviewed  UTI-e coli-on levaquin  Morbid obesity  Fatty liver-LFT are improved-no acute issue  Immobility  Depression-resume her zoloft and buspar she is on at home  GERD-resume her prilosec    SCD for DVT proph  02 via NC  pulm eval requested by anesthesia  miralax for constipation, enema if needed  Discussed with nurse to please sit up in bed or chair and encourage IS use  Pain control  Labs reviewed and acceptable    Patient Active Problem List   Diagnosis Code    S/P gastric bypass Z98.84    NAFLD (nonalcoholic fatty liver disease) K76.0    Morbid obesity with BMI of 50.0-59.9, adult ([facility]) E66.01, Z68.43    Frequent falls R29.6    UTI (urinary tract infection) N39.0    Elevated LFTs R79.89    Fracture of left humerus S42.302A    Hypoxia R09.02    Immobility Z74.09       Subjective:    Am I going to surgery?   Denies prod cough  No chest pain  Denies SOB  No N/V  Wants to get back on her antidepressants  States no BM since admission, cannot have BM in bedpan    Review of systems:    Constitutional: denies fevers, chills, myalgias  Respiratory: denies SOB, cough  Cardiovascular: denies chest pain, palpitations  Gastrointestinal: denies nausea, vomiting, diarrhea      Vital signs/Intake and Output:  Visit Vitals    /79 (BP 1 Location: Left leg, BP Patient Position: At rest)    Pulse 100    Temp 98.5 °F (36.9 °C)    Resp 17    Ht 5' 3\" (1.6 m)    Wt 139.3 kg (307 lb 1.6 oz)    SpO2 92%    BMI 54.4 kg/m2     Current Shift:     Last three shifts:  01/07 1901 - 01/09 0700  In: 840 [P.O.:840]  Out: 1625 [Urine:1625]    Exam:    General: obese WF, alert, NAD, OX3  Head/Neck: NCAT, supple, No masses, No lymphadenopathy  CVS:Regular rate and rhythm, no M/R/G, S1/S2 heard, no thrill  Lungs:Clear to auscultation bilaterally, no wheezes, rhonchi, or rales, dec BS left base  Abdomen: Soft, Nontender, No distention, Normal Bowel sounds, No hepatomegaly  Extremities: left arm in sling, bruising posteriorly arm/shoulder, edema 1 plus LE BL  Neuro:grossly normal , follows commands  Psych:appropriate                Labs: Results:       Chemistry Recent Labs      01/09/17   0804   GLU  109*   NA  139   K  4.5   CL  101   CO2  32   BUN  17   CREA  0.77   CA  8.8   AGAP  6   BUCR  22*   AP  167*   TP  6.7   ALB  2.1*   GLOB  4.6*   AGRAT  0.5*      CBC w/Diff Recent Labs      01/09/17   0804   WBC  8.1   RBC  4.48   HGB  12.7   HCT  40.5   PLT  245   GRANS  60   LYMPH  26   EOS  1      Cardiac Enzymes No results for input(s): CPK, CKND1, YESSENIA in the last 72 hours. No lab exists for component: CKRMB, TROIP   Coagulation No results for input(s): PTP, INR, APTT in the last 72 hours. No lab exists for component: INREXT    Lipid Panel No results found for: CHOL, CHOLPOCT, CHOLX, CHLST, CHOLV, [id number], HDL, LDL, NLDLCT, DLDL, LDLC, DLDLP, [id number], VLDLC, VLDL, TGL, TGLX, TRIGL, [id number], TRIGP, TGLPOCT, [id number], CHHD, CHHDX   BNP No results for input(s): BNPP in the last 72 hours.    Liver Enzymes Recent Labs      01/09/17   0804   TP  6.7   ALB  2.1*   AP  167*   SGOT  102*      Thyroid Studies No results found for: T4, T3U, TSH, TSHEXT     Procedures/imaging: see electronic medical records for all procedures/Xrays and details which were not copied into this note but were reviewed prior to creation of Naun De La O MD

## 2017-01-09 NOTE — PERIOP NOTES
Patients PCA programmed, connected and infusing. All clamps open. Instructed patient on how to use PCA for pain control. Button given to patient.

## 2017-01-09 NOTE — PROGRESS NOTES
Given increasing consolidation and atelectasis, would not be able to consider interscalene block as this has a 90% rate of paralysis of the ipsilateral phrenic => diaphragm. Attempted to consult intensivist; however, [phone], which is the phone number given me for their answering service plays a message and then disconnects. Unable to reach anyone.

## 2017-01-09 NOTE — ANESTHESIA PREPROCEDURE EVALUATION
Anesthetic History   No history of anesthetic complications     Pertinent negatives: No PONV       Review of Systems / Medical History  Patient summary reviewed, nursing notes reviewed and pertinent labs reviewed    Pulmonary            Asthma (STEROIDS if has bronchitis or other issues. )   Pertinent negatives: No smoker  Comments: Progressive atelectasis, productive cough.     Neuro/Psych         Psychiatric history     Cardiovascular  Within defined limits              Pertinent negatives: No past MI and CAD       GI/Hepatic/Renal     GERD      Liver disease (NAFLD)  Pertinent negatives: No renal disease   Endo/Other    Diabetes: well controlled, type 2    Morbid obesity and arthritis     Other Findings   Comments: -etoh           Physical Exam    Airway  Mallampati: I  TM Distance: 4 - 6 cm    Mouth opening: Normal     Cardiovascular    Rhythm: regular  Rate: normal         Dental    Dentition: Edentulous     Pulmonary                 Abdominal         Other Findings            Anesthetic Plan    ASA: 4  Anesthesia type: general          Induction: Intravenous  Anesthetic plan and risks discussed with: Patient

## 2017-01-09 NOTE — ROUTINE PROCESS
Bedside and Verbal shift change report given to LUIS Maya (oncoming nurse) by Stephie Whalen   (offgoing nurse). Report included the following information SBAR, Kardex, Intake/Output, MAR, Recent Results and Med Rec Status.

## 2017-01-09 NOTE — OP NOTES
PREOPERATIVE DIAGNOSIS: Displaced 4 part fracture, left shoulder. POSTOPERATIVE DIAGNOSIS: Displaced 4 part fracture, left shoulder. PROCEDURE: left Reverse Total Shoulder Arthroplasty [employer]    IMPLANTS:   Implant Name Type Inv. Item Serial No.  Lot No. LRB No. Used Action   CEMENT BNE FAST SET 20GM --  - [id number]  CEMENT BNE FAST SET 20GM --   [facility] [employer] [id number] Left 1 Implanted   SCR TORQUE DEFINING KIT -- [employer] - [id number]  SCR TORQUE DEFINING KIT -- Jerene Cramp [id number] [employer]  Left 1 Implanted   SCR BNE LCK GLENOSPHERE -- [employer] - [id number]  SCR BNE LCK GLENOSPHERE -- [employer]QU[employer] [id number] [employer]  Left 1 Implanted   PLATE RONEL HUM ADPT REV +5MM -- Jerene Cramp - [id number]  PLATE RONEL HUM ADPT REV +5MM -- Jerene Cramp [id number] [employer]  Left 1 Implanted   SCR LCK CAP KIT 4.5X42MM RED -- E[employer] - [id number]  SCR LCK CAP KIT 4.5X42MM RED -- Jerene Cramp [id number] [employer]  Left 1 Implanted   SCR LCK CAP KIT 4.5X18MM WHT -- E[employer] - [id number]  SCR LCK CAP KIT 4.5X18MM WHT -- [employer] [id number] [employer]  Left 1 Implanted   SCR LCK CAP KIT 4.5X46MM RED -- E[employer] - [id number]  SCR LCK CAP KIT 4.5X46MM RED -- Jerene Cramp [id number] [employer]  Left 1 Implanted   HEAD GLENOSPHERE REV 38MM -- [employer] - [id number]  HEAD GLENOSPHERE REV 38MM -- [employer] [id number] [employer]  Left 1 Implanted   STEM HUM FX PLATFORM 8.5MM LT -- E[employer] - [id number]  STEM HUM FX PLATFORM 8.5MM LT -- Jerene Cramp [id number] [employer] N/A Left 1 Implanted   LINER HUM REV 38MM +2.5 -- [employer] - [id number]  LINER HUM REV 38MM +2.5 -- [employer] [id number] [employer] N/A Left 1 Implanted   TRAY HUM ADPT REV +5 -- Jerene Cramp - [id number]   [facility] ADPT REV +5 -- Hever Elijah [id number] [employer] N/A Left 1 Implanted       SURGEON: Federico Jaramillo MD    ANESTHESIOLOGIST: Anesthesiologist: Max Reynolds MD  CRNA: Masha Putnam CRNA     ANAESTHESIA: general anesthesia after scalene block. COMPLICATIONS: None.      ESTIMATED BLOOD LOSS: 100 cc     DRAINS:  None. DESCRIPTION OF PROCEDURE: This 61 y. o.year-old female  with a history of a Displaced 4 part fracture The patient then opted to proceed with reconstructive surgery. The patient was taken to the operating room and placed in the beach-chair position, and the left shoulder was prepped and draped in a normal manner. The skin was injected with 0.5% Marcaine with epinephrine. An oblique incision was made over the anterior aspect of the shoulder. The incision was carried through the subcutaneous tissue through the deep fascia and the cephalic vein was retracted laterally. The dissection was carried around the humeral head. The biceps was dissected free, tenodesed distally with #2 FiberWire suture and the intraarticular portion was excised. The lesser and greater tuberosities were osteotomized and the subscapularis was freed circumferentially and allowed to retract medially with stay sutures of #2 FiberWire. The humeral head was removed. The shaft was prepared using sequential reamers. The humeral shaft was retracted inferiorly and posteriorly. Attention was then turned to the glenoid. The labrum was excised circumferentially. The glenoid reamer was then used and finally the metaglene was placed in position, it was then secured using 3 screws, all of which were locking. The  glenosphere was then screwed in position. The proximal humerus was then prepared and trials were placed in position with sequential reductions. The trials were removed. The final humeral  component was cemented in position. The shoulder was reduced. Good motion with no sign of instability was confirmed. The wound was thoroughly irrigated with antibiotic solution. Hemostasis was assured using a Bovie. The lesser and greater tuberosities  were reattached using the #2 FiberWire sutures. The delto-pectoral interval was closed using 0 Vicryl suture.  The subcutaneous tissue was closed with 2-0 vicryl and the skin with a Quill stitch. A sterile compressive dressing was applied, along with a sling. The patient left the operating room in satisfactory condition.

## 2017-01-09 NOTE — INTERVAL H&P NOTE
H&P Update:  Priscilla Alvarez was seen and examined. History and physical has been reviewed. The patient has been examined. There have been no significant clinical changes since the completion of the originally dated History and Physical.  Patient identified by surgeon; surgical site was confirmed by patient and surgeon.     Signed By: Luis Daniel Singh MD     January 9, 2017 12:41 PM

## 2017-01-09 NOTE — PROGRESS NOTES
Shift Summary  Pain managed by pain medication. Surgery scheduled tomorrow. Pt. Denies any nausea. Vital signs stable.

## 2017-01-09 NOTE — H&P (VIEW-ONLY)
Consult Note    Patient: Sabino Gill               Sex: female          DOA: 1/3/2017         YOB: 1950      Age:  61 y.o.        LOS:  LOS: 1 day              HPI:     Sabino Gill is a 61 y.o. female who has been seen for left proximal humeral displaced fracture    Past Medical History   Diagnosis Date    Active rheumatic fever with cardiac involvement     Anemia     Asthma     Chronic back pain     DDD (degenerative disc disease), cervical     Depression     [facility] [address]     DVT (deep vein thrombosis) in pregnancy ([facility] [address] [facility]     Edema     [facility]     Fatty liver disease, non-alcoholic 9/03/9190    GERD (gastroesophageal reflux disease)     Heart abnormality     HNP (herniated nucleus pulposus)     Hypercholesterolemia     Knee pain     Lymphedema     Osteopenia     RLS (restless legs syndrome)     Spinal stenosis     Uterine endometrial cancer, sarcoma (HCC)        Past Surgical History   Procedure Laterality Date    Hx gastric bypass       In [facility].  Hx darryl and bso      Hx carpal tunnel release      Hx gastric bypass      Pr lap,cholecystectomy         History reviewed. No pertinent family history. Social History     Social History    Marital status: SINGLE     Spouse name: N/A    Number of children: N/A    Years of education: N/A     Social History Main Topics    Smoking status: Never Smoker    Smokeless tobacco: Never Used    Alcohol use No    Drug use: No    Sexual activity: Not Asked     Other Topics Concern    None     Social History Narrative       Prior to Admission medications    Medication Sig Start Date End Date Taking? Authorizing Provider   glucosamine-chondroitin (ARTHX) 500-400 mg cap Take 1 Cap by mouth daily. Yes Historical Provider   HYDROcodone-acetaminophen (NORCO) 7.5-325 mg per tablet Take 1 Tab by mouth every six (6) hours as needed for Pain. Max Daily Amount: 4 Tabs.  1/3/17  Yes Sissy SUTHERLAND MD Roger   budesonide-formoterol (SYMBICORT) 160-4.5 mcg/actuation HFA inhaler Take 2 Puffs by inhalation two (2) times a day. Yes Historical Provider   gabapentin (NEURONTIN) 100 mg capsule Take 300 mg by mouth two (2) times a day. 300 mg in evening, then 600 mg at bedtime  Indications: NEUROPATHIC PAIN   Yes Historical Provider   albuterol (PROVENTIL HFA, VENTOLIN HFA, PROAIR HFA) 90 mcg/actuation inhaler Take  by inhalation. Yes Historical Provider   fluticasone (FLONASE) 50 mcg/actuation nasal spray 2 Sprays by Both Nostrils route daily. Yes Historical Provider   risedronate (ACTONEL) 150 mg tablet Take 150 mg by mouth every thirty (30) days. Yes Historical Provider   rOPINIRole (REQUIP) 1 mg tablet Take 2 mg by mouth three (3) times daily. 1 mg in evening (1900), then 2 mg at bedtime (0100)  Indications: Restless Legs Syndrome   Yes Historical Provider   pravastatin (PRAVACHOL) 40 mg tablet Take 40 mg by mouth nightly. Yes Historical Provider   tolterodine (DETROL) 2 mg tablet Take 1 Tab by mouth two (2) times a day. Indications: BLADDER HYPERACTIVITY 9/21/16  Yes Pato James MD   enoxaparin (LOVENOX) 60 mg/0.6 mL injection 60 mg by SubCUTAneous route daily. Yes Cedrick Ashraf MD   loratadine (CLARITIN) 10 mg tablet Take 10 mg by mouth daily. Yes Historical Provider   BUSPIRONE HCL (BUSPAR PO) Take 10 mg by mouth two (2) times a day. Yes Historical Provider   FUROSEMIDE (LASIX PO) Take 20 mg by mouth daily. Yes Historical Provider   MONTELUKAST SODIUM (SINGULAIR PO) Take  by mouth. Yes Historical Provider   TRAMADOL HCL (TRAMADOL PO) Take 50 mg by mouth four (4) times daily. Yes Historical Provider   TRAZODONE HCL (TRAZODONE PO) Take 200 mg by mouth nightly as needed for Other (sleep). Yes Historical Provider   SERTRALINE HCL (ZOLOFT PO) Take 100 mg by mouth daily. Yes Historical Provider   multivitamin (ONE A DAY) tablet Take 1 Tab by mouth daily.     Historical Provider calcium-vitamin D (OYSTER SHELL) 500 mg(1,250mg) -200 unit per tablet Take 1 Tab by mouth two (2) times daily (with meals). Historical Provider   omega-3 fatty acids-vitamin e (FISH OIL) 1,000 mg cap Take 1 Cap by mouth. Historical Provider   flaxseed oil 1,000 mg cap Take  by mouth. Historical Provider   acetaminophen (TYLENOL ARTHRITIS PAIN) 650 mg CR tablet Take 650 mg by mouth every six (6) hours as needed for Pain. Historical Provider       Allergies   Allergen Reactions    Augmentin [Amoxicillin-Pot Clavulanate] Other (comments)     Sever diarrhea    Codeine Unknown (comments)    Lodine [Etodolac] Unknown (comments)       Review of Systems  A comprehensive review of systems was negative except for that written in the History of Present Illness. Physical Exam:      Visit Vitals    /62    Pulse 94    Temp 98.4 °F (36.9 °C)    Resp 17    Ht 5' 3\" (1.6 m)    Wt 140.6 kg (310 lb)    SpO2 94%    BMI 54.91 kg/m2       Physical Exam:  Physical exam was negative except for: Musculoskeletal: positive for tenderness and swelling left shoulder    Labs Reviewed: All lab results for the last 24 hours reviewed. Assessment/Plan     Active Problems: Morbid obesity with BMI of 50.0-59.9, adult ([facility] [address] (9/21/2016)      Frequent falls (1/4/2017)      UTI (urinary tract infection) (1/4/2017)      Elevated LFTs (1/4/2017)      Fracture of left humerus (1/4/2017)      Hypoxia (1/4/2017)      Immobility (1/4/2017)        Given the displacement between the two ER visits, she will be scheduled for a reverse total shoulder replacement at the end of the OR schedule Friday, 1/6/17 if she can be cleared medically. Case discussed with Dr. Dorota Lundberg.

## 2017-01-09 NOTE — PERIOP NOTES
Handoff Report from Operating Room to PACU   Report received from Ori Bentley CRNA and Jaime Jarquin RN regarding patient, Yanet Banks . Surgeon(s): Sherrell Jarrell MD and Procedure confirmed with allergies and dressings discussed. Anesthesia type, drugs, patient history, complications, estimated blood loss, vital signs, intake and output, and last pain medication, lines, reversal medications and temperature were reviewed. PACU monitoring initiated. Non-rebreather mask with 10 liters 02 applied. 02Sat 100%. Patient is drowsy, able to Follow commands. Dressing to left shoulder CDI, PIV #20 g  right arm, capped and triple lumen right neck, infusing without difficulty. See Doc flowsheet for physical assessment details.    Leydi Sheehan RN

## 2017-01-09 NOTE — CONSULTS
[facility]  Pulmonary, Critical Care, and Sleep Medicine    Initial Patient Consult    Name: Melani Castaneda MRN: [id number]   : 1950 Hospital: [facility] [place]   Date: 2017        Subjective: This patient has been seen and evaluated at the request of Dr. Eugenia Saucedo  for pre-op clearance. Patient is a 61 y.o. female with morbid obesity, mostly wheel chair bound, chronic controlled asthma, Admitted on 1/3 following fall out of wheel chair  Sustained left humerus fx. Pt apparently has been cleared for surgery from cardiac standpoint. But surgery is pending pulmonary clearance due to persistent cxr abnormalities. Pt reports that she is at her usual state except for pain left arm. No wheezing. No fever or chills. Chronic nonproductive cough. No headache. No changes in respiratory status. Past Medical History   Diagnosis Date    Active rheumatic fever with cardiac involvement     Anemia     Asthma     Chronic back pain     DDD (degenerative disc disease), cervical     Depression     Diabetes (HCC)     D[facility]) [facility] ([facility] [address]     Edema     Factor V Leiden mutation ([facility] [address] [facility]     Fatty liver disease, non-alcoholic     GERD (gastroesophageal reflux disease)     Heart abnormality     HNP (herniated nucleus pulposus)     Hypercholesterolemia     Knee pain     Lymphedema     Osteopenia     RLS (restless legs syndrome)     Spinal stenosis     Uterine endometrial cancer, sarcoma (HCC)       Past Surgical History   Procedure Laterality Date    Hx gastric bypass       In [facility] ago.  Hx darryl and bso      Hx carpal tunnel release      Hx gastric bypass      Pr lap,cholecystectomy        Prior to Admission medications    Medication Sig Start Date End Date Taking? Authorizing Provider   glucosamine-chondroitin (ARTHX) 500-400 mg cap Take 1 Cap by mouth daily.    Yes Historical Provider   HYDROcodone-acetaminophen (NORCO) 7.5-325 mg per tablet Take 1 Tab by mouth every six (6) hours as needed for Pain. Max Daily Amount: 4 Tabs. 1/3/17  Yes Jay Arias MD   budesonide-formoterol (SYMBICORT) 160-4.5 mcg/actuation HFA inhaler Take 2 Puffs by inhalation two (2) times a day. Yes Historical Provider   gabapentin (NEURONTIN) 100 mg capsule Take 300 mg by mouth two (2) times a day. 300 mg in evening, then 600 mg at bedtime  Indications: NEUROPATHIC PAIN   Yes Historical Provider   albuterol (PROVENTIL HFA, VENTOLIN HFA, PROAIR HFA) 90 mcg/actuation inhaler Take  by inhalation. Yes Historical Provider   fluticasone (FLONASE) 50 mcg/actuation nasal spray 2 Sprays by Both Nostrils route daily. Yes Historical Provider   risedronate (ACTONEL) 150 mg tablet Take 150 mg by mouth every thirty (30) days. Yes Historical Provider   rOPINIRole (REQUIP) 1 mg tablet Take 2 mg by mouth three (3) times daily. 1 mg in evening (1900), then 2 mg at bedtime (0100)  Indications: Restless Legs Syndrome   Yes Historical Provider   pravastatin (PRAVACHOL) 40 mg tablet Take 40 mg by mouth nightly. Yes Historical Provider   tolterodine (DETROL) 2 mg tablet Take 1 Tab by mouth two (2) times a day. Indications: BLADDER HYPERACTIVITY 9/21/16  Yes Mey Guillen MD   enoxaparin (LOVENOX) 60 mg/0.6 mL injection 60 mg by SubCUTAneous route daily. Yes Cedrick Ashraf MD   loratadine (CLARITIN) 10 mg tablet Take 10 mg by mouth daily. Yes Historical Provider   BUSPIRONE HCL (BUSPAR PO) Take 10 mg by mouth two (2) times a day. Yes Historical Provider   FUROSEMIDE (LASIX PO) Take 20 mg by mouth daily. Yes Historical Provider   MONTELUKAST SODIUM (SINGULAIR PO) Take  by mouth. Yes Historical Provider   TRAMADOL HCL (TRAMADOL PO) Take 50 mg by mouth four (4) times daily. Yes Historical Provider   TRAZODONE HCL (TRAZODONE PO) Take 200 mg by mouth nightly as needed for Other (sleep).    Yes Historical Provider   SERTRALINE HCL (ZOLOFT PO) Take 100 mg by mouth daily. Yes Historical Provider   multivitamin (ONE A DAY) tablet Take 1 Tab by mouth daily. Historical Provider   calcium-vitamin D (OYSTER SHELL) 500 mg(1,250mg) -200 unit per tablet Take 1 Tab by mouth two (2) times daily (with meals). Historical Provider   omega-3 fatty acids-vitamin e (FISH OIL) 1,000 mg cap Take 1 Cap by mouth. Historical Provider   flaxseed oil 1,000 mg cap Take  by mouth. Historical Provider   acetaminophen (TYLENOL ARTHRITIS PAIN) 650 mg CR tablet Take 650 mg by mouth every six (6) hours as needed for Pain. Historical Provider     Allergies   Allergen Reactions    Augmentin [Amoxicillin-Pot Clavulanate] Other (comments)     Sever diarrhea    Codeine Unknown (comments)    Lodine [Etodolac] Unknown (comments)      Social History   Substance Use Topics    Smoking status: Never Smoker    Smokeless tobacco: Never Used    Alcohol use No      History reviewed. No pertinent family history.      Current Facility-Administered Medications   Medication Dose Route Frequency    polyethylene glycol (MIRALAX) packet 17 g  17 g Oral DAILY    sodium phosphate (FLEET'S) enema 118 mL  1 Enema Rectal NOW    sertraline (ZOLOFT) tablet 100 mg  100 mg Oral DAILY    busPIRone (BUSPAR) tablet 10 mg  10 mg Oral BID    pantoprazole (PROTONIX) tablet 40 mg  40 mg Oral DAILY    methylPREDNISolone (PF) (SOLU-MEDROL) injection 40 mg  40 mg IntraVENous Q6H    guaiFENesin SR (MUCINEX) tablet 600 mg  600 mg Oral Q12H    docusate sodium (COLACE) capsule 100 mg  100 mg Oral DAILY    nystatin (MYCOSTATIN) 100,000 unit/gram powder   Topical BID    furosemide (LASIX) tablet 40 mg  40 mg Oral DAILY    rOPINIRole (REQUIP) tablet 2 mg  2 mg Oral QHS    insulin lispro (HUMALOG) injection   SubCUTAneous AC&HS    gabapentin (NEURONTIN) capsule 300 mg  300 mg Oral QPM    gabapentin (NEURONTIN) capsule 600 mg  600 mg Oral QHS    rOPINIRole (REQUIP) tablet 1 mg  1 mg Oral QPM    potassium chloride (K-DUR, KLOR-CON) SR tablet 20 mEq  20 mEq Oral DAILY    clindamycin phosphate 900 mg in 0.9% sodium chloride (MBP/ADV) 100 mL ADV  900 mg IntraVENous ON CALL TO OR    Freedom zamora(AC),Tet Vac-PF (BOOSTRIX) suspension 0.5 mL  0.5 mL IntraMUSCular PRIOR TO DISCHARGE    fluticasone-salmeterol (ADVAIR) 250mcg-50mcg/puff  1 Puff Inhalation BID    fluticasone (FLONASE) 50 mcg/actuation nasal spray 2 Spray  2 Spray Both Nostrils DAILY    tolterodine (DETROL) tablet 2 mg  2 mg Oral BID    loratadine (CLARITIN) tablet 10 mg  10 mg Oral DAILY    levoFLOXacin (LEVAQUIN) 500 mg in D5W IVPB  500 mg IntraVENous Q24H       Review of Systems:  HEENT: No epistaxis, no nasal drainage, no difficulty in swallowing  Respiratory: as above  Cardiovascular: no chest pain, no palpitations, no chronic leg edema, no syncope  Gastrointestinal: no abd pain, no vomiting, no diarrhea, no bleeding symptoms  Genitourinary: No urinary symptoms  Integument/breast: No ulcers  Musculoskeletal:Neg  Neurological: No focal weakness, no seizures, no headaches  Behvioral/Psych: No anxiety, no depression  Constitutional: No fever, no chills, no weight loss, no night sweats  SLEEP: No snoring, no witnessed apneas, no daytime somnolence, no morning headaches. Sleep refreshed.     Objective:   Vital Signs:    Visit Vitals    /79 (BP 1 Location: Left leg, BP Patient Position: At rest)    Pulse 100    Temp 98.5 °F (36.9 °C)    Resp 17    Ht 5' 3\" (1.6 m)    Wt 139.3 kg (307 lb 1.6 oz)    SpO2 92%    BMI 54.4 kg/m2       O2 Device: Nasal cannula   O2 Flow Rate (L/min): 2 l/min   Temp (24hrs), Av.4 °F (36.9 °C), Min:98 °F (36.7 °C), Max:98.6 °F (37 °C)       Intake/Output:     Intake/Output Summary (Last 24 hours) at 17 1007  Last data filed at 17 0432   Gross per 24 hour   Intake              600 ml   Output             1000 ml   Net             -400 ml         Physical Exam:   General: comfortable  Neck: No adenopathy or thyroid swelling  CVS: S1S2 no murmurs  RS: Mod AE bilaterally, decreased BS with crackles at bases  Abd: soft, non tender, no hepatosplenomegaly  Neuro: non focal, awake, alert  Extrm: no leg edema   Skin: no rash    Data review:   Labs:  Recent Labs      01/09/17   0804   WBC  8.1   HGB  12.7   HCT  40.5   PLT  245     Recent Labs      01/09/17   0804   NA  139   K  4.5   CL  101   CO2  32   GLU  109*   BUN  17   CREA  0.77   CA  8.8   ALB  2.1*   SGOT  102*   ALT  122*     No results for input(s): PH, PCO2, PO2, HCO3, FIO2 in the last 72 hours. Imaging:  I have personally reviewed the patients radiographs and have reviewed the reports: Allergies        Unspecified: Augmentin [Amoxicillin-pot Clavulanate]; Codeine; Lodine [Etodolac]       Result Information      Status Provider Status        Final result (Exam End: 1/9/2017  8:29 AM) Open        Study Result      Chest, single view     Indication: Pneumonia, follow-up examination     Comparison: Several prior examinations, most recently 1/6/2017     Findings: Portable upright AP view of the chest was obtained. The lungs are  progressively underexpanded with redemonstration of bibasilar airspace  opacities, right greater than left. Linear atelectasis noted, similar to prior. No pneumothorax or large pleural effusion. Cardiac size and mediastinal contours  are stable. No new acute osseous abnormality demonstrated, with similar  radiographic appearance to a comminuted proximal left humeral fracture.     IMPRESSION  Impression:  Progressively underexpanded lungs with right basilar airspace opacity which may  represent atelectasis or pneumonia. IMPRESSION:   · Rt LL atelectasis,  I don't think this is pneumonia.   Primarily hypoventilation issues  · Morbid obesity  · Acute left humerus fx  · Chronic hypoxia due to hypoventilation and possibly yaw  · Hx of factor V Leiden      RECOMMENDATIONS:   · Periop solumedrol,  Ordered  · periop duoneb  · Post op bipap support  · Continue abx for uti  · Continue lovenox of dvt proph and scd  · ssi  · Discuss with Dr. Jessica Cole from anesthesia.      Thank you for the consultation       Earline Molina MD

## 2017-01-09 NOTE — ANESTHESIA PROCEDURE NOTES
Central Line Placement    Start time: 1/9/2017 1:28 PM  End time: 1/9/2017 1:45 PM  Performed by: Zahida Morris  Authorized by: Zahida Morris     Indications: vascular access  Preanesthetic Checklist: patient identified, risks and benefits discussed, anesthesia consent, site marked and patient being monitored      Pre-procedure: All elements of maximal sterile barrier technique followed? Yes    Maximal barrier precautions followed, 2% Chlorhexidine for cutaneous antisepsis, Hand hygiene performed prior to catheter insertion and Ultrasound guidance    Sterile Ultrasound Technique followed?: Yes          Procedure:   Prep:  Chlorhexidine  Location:  Internal jugular  Orientation:  Right  Patient position:  Trendelenburg  Catheter type:  Triple lumen  Catheter size:  8 Fr  Catheter length:   Other (comment) (catheter 15 cm in of 30 cm total)  Number of attempts:  1  Successful placement: Yes      Assessment:   Post-procedure:  Catheter secured, sterile dressing applied and sterile dressing with CHG applied  Assessment:  Blood return through all ports and free fluid flow  Insertion:  Uncomplicated  Patient tolerance:  Patient tolerated the procedure well with no immediate complications

## 2017-01-09 NOTE — PERIOP NOTES
TRANSFER - IN REPORT:    Verbal report received from Ynui Newton RN on Caitie Batters  being received from [address] [place] [address] for ordered procedure      Report consisted of patients Situation, Background, Assessment and   Recommendations(SBAR). Information from the following report(s) SBAR, MAR and Recent Results was reviewed with the receiving nurse. Opportunity for questions and clarification was provided. Assessment completed upon patients arrival to unit and care assumed.

## 2017-01-09 NOTE — PROGRESS NOTES
Spoke with Iglesia Osman about patient's management. Agrees phrenic block probably not advised. Also, suggested steroid, duoneb, post-op bi-pap. Poss ICU depending on post-op course.

## 2017-01-09 NOTE — ANESTHESIA POSTPROCEDURE EVALUATION
Post-Anesthesia Evaluation and Assessment    Cardiovascular Function/Vital Signs  Visit Vitals    /70    Pulse 99    Temp 37.2 °C (99 °F)    Resp 23    Ht 5' 3\" (1.6 m)    Wt 139.3 kg (307 lb 1.6 oz)    SpO2 97%    BMI 54.4 kg/m2       Patient is status post Procedure(s):  REVERSE LEFT TOTAL SHOULDER ARTHROPLASTY. Nausea/Vomiting: Controlled. Postoperative hydration reviewed and adequate. Pain:  Pain Scale 1: Numeric (0 - 10) (01/09/17 1845)  Pain Intensity 1: 4 (01/09/17 1845)   Managed. Neurological Status:   Neuro (WDL): Exceptions to WDL (01/09/17 2416)   At baseline. Mental Status and Level of Consciousness: Alert. Pulmonary Status:   O2 Device: BIPAP (01/09/17 2074)   Adequate oxygenation and airway patent on BiPAP. Complications related to anesthesia: None that were not anticipated. Post-anesthesia assessment completed. No concerns. Patient has met all discharge requirements.     Signed By: Tha Arredondo MD    January 9, 2017

## 2017-01-10 LAB
ALBUMIN SERPL BCP-MCNC: 2 G/DL (ref 3.4–5)
ALBUMIN/GLOB SERPL: 0.5 {RATIO} (ref 0.8–1.7)
ALP SERPL-CCNC: 291 U/L (ref 45–117)
ALT SERPL-CCNC: 222 U/L (ref 13–56)
ANION GAP BLD CALC-SCNC: 4 MMOL/L (ref 3–18)
AST SERPL W P-5'-P-CCNC: 315 U/L (ref 15–37)
BASOPHILS # BLD AUTO: 0 K/UL (ref 0–0.06)
BASOPHILS # BLD: 0 % (ref 0–2)
BILIRUB SERPL-MCNC: 0.7 MG/DL (ref 0.2–1)
BUN SERPL-MCNC: 14 MG/DL (ref 7–18)
BUN/CREAT SERPL: 18 (ref 12–20)
CALCIUM SERPL-MCNC: 8.2 MG/DL (ref 8.5–10.1)
CHLORIDE SERPL-SCNC: 102 MMOL/L (ref 100–108)
CO2 SERPL-SCNC: 33 MMOL/L (ref 21–32)
CREAT SERPL-MCNC: 0.76 MG/DL (ref 0.6–1.3)
DIFFERENTIAL METHOD BLD: ABNORMAL
EOSINOPHIL # BLD: 0 K/UL (ref 0–0.4)
EOSINOPHIL NFR BLD: 0 % (ref 0–5)
ERYTHROCYTE [DISTWIDTH] IN BLOOD BY AUTOMATED COUNT: 14.5 % (ref 11.6–14.5)
EST. AVERAGE GLUCOSE BLD GHB EST-MCNC: 123 MG/DL
GLOBULIN SER CALC-MCNC: 4.4 G/DL (ref 2–4)
GLUCOSE BLD STRIP.AUTO-MCNC: 127 MG/DL (ref 70–110)
GLUCOSE BLD STRIP.AUTO-MCNC: 151 MG/DL (ref 70–110)
GLUCOSE BLD STRIP.AUTO-MCNC: 160 MG/DL (ref 70–110)
GLUCOSE BLD STRIP.AUTO-MCNC: 198 MG/DL (ref 70–110)
GLUCOSE SERPL-MCNC: 140 MG/DL (ref 74–99)
HBA1C MFR BLD: 5.9 % (ref 4.5–5.6)
HCT VFR BLD AUTO: 37.4 % (ref 35–45)
HGB BLD-MCNC: 11.6 G/DL (ref 12–16)
LYMPHOCYTES # BLD AUTO: 12 % (ref 21–52)
LYMPHOCYTES # BLD: 1.1 K/UL (ref 0.9–3.6)
MCH RBC QN AUTO: 28.4 PG (ref 24–34)
MCHC RBC AUTO-ENTMCNC: 31 G/DL (ref 31–37)
MCV RBC AUTO: 91.7 FL (ref 74–97)
MONOCYTES # BLD: 0.9 K/UL (ref 0.05–1.2)
MONOCYTES NFR BLD AUTO: 10 % (ref 3–10)
NEUTS SEG # BLD: 6.9 K/UL (ref 1.8–8)
NEUTS SEG NFR BLD AUTO: 78 % (ref 40–73)
PLATELET # BLD AUTO: 253 K/UL (ref 135–420)
PMV BLD AUTO: 11.1 FL (ref 9.2–11.8)
POTASSIUM SERPL-SCNC: 5.2 MMOL/L (ref 3.5–5.5)
PROT SERPL-MCNC: 6.4 G/DL (ref 6.4–8.2)
RBC # BLD AUTO: 4.08 M/UL (ref 4.2–5.3)
SODIUM SERPL-SCNC: 139 MMOL/L (ref 136–145)
WBC # BLD AUTO: 8.8 K/UL (ref 4.6–13.2)

## 2017-01-10 PROCEDURE — 97164 PT RE-EVAL EST PLAN CARE: CPT

## 2017-01-10 PROCEDURE — 82962 GLUCOSE BLOOD TEST: CPT

## 2017-01-10 PROCEDURE — 74011000250 HC RX REV CODE- 250: Performed by: ORTHOPAEDIC SURGERY

## 2017-01-10 PROCEDURE — 97110 THERAPEUTIC EXERCISES: CPT

## 2017-01-10 PROCEDURE — 85025 COMPLETE CBC W/AUTO DIFF WBC: CPT | Performed by: HOSPITALIST

## 2017-01-10 PROCEDURE — 80053 COMPREHEN METABOLIC PANEL: CPT | Performed by: HOSPITALIST

## 2017-01-10 PROCEDURE — 74011250636 HC RX REV CODE- 250/636: Performed by: FAMILY MEDICINE

## 2017-01-10 PROCEDURE — 77010033678 HC OXYGEN DAILY

## 2017-01-10 PROCEDURE — 97535 SELF CARE MNGMENT TRAINING: CPT

## 2017-01-10 PROCEDURE — 74011000258 HC RX REV CODE- 258: Performed by: ORTHOPAEDIC SURGERY

## 2017-01-10 PROCEDURE — 74011250636 HC RX REV CODE- 250/636: Performed by: INTERNAL MEDICINE

## 2017-01-10 PROCEDURE — 74011250637 HC RX REV CODE- 250/637: Performed by: HOSPITALIST

## 2017-01-10 PROCEDURE — 83036 HEMOGLOBIN GLYCOSYLATED A1C: CPT | Performed by: SPECIALIST

## 2017-01-10 PROCEDURE — 65270000029 HC RM PRIVATE

## 2017-01-10 PROCEDURE — 74011636637 HC RX REV CODE- 636/637: Performed by: INTERNAL MEDICINE

## 2017-01-10 PROCEDURE — 97168 OT RE-EVAL EST PLAN CARE: CPT

## 2017-01-10 PROCEDURE — 74011250637 HC RX REV CODE- 250/637: Performed by: FAMILY MEDICINE

## 2017-01-10 PROCEDURE — 74011636637 HC RX REV CODE- 636/637: Performed by: FAMILY MEDICINE

## 2017-01-10 PROCEDURE — 74011250637 HC RX REV CODE- 250/637: Performed by: INTERNAL MEDICINE

## 2017-01-10 RX ORDER — PREDNISONE 10 MG/1
10 TABLET ORAL
Status: DISCONTINUED | OUTPATIENT
Start: 2017-01-14 | End: 2017-01-13 | Stop reason: HOSPADM

## 2017-01-10 RX ORDER — PREDNISONE 10 MG/1
10 TABLET ORAL
Status: COMPLETED | OUTPATIENT
Start: 2017-01-11 | End: 2017-01-13

## 2017-01-10 RX ORDER — SODIUM CHLORIDE 0.9 % (FLUSH) 0.9 %
5-10 SYRINGE (ML) INJECTION AS NEEDED
Status: DISCONTINUED | OUTPATIENT
Start: 2017-01-10 | End: 2017-01-13 | Stop reason: HOSPADM

## 2017-01-10 RX ORDER — SODIUM CHLORIDE 0.9 % (FLUSH) 0.9 %
5-10 SYRINGE (ML) INJECTION EVERY 8 HOURS
Status: DISCONTINUED | OUTPATIENT
Start: 2017-01-10 | End: 2017-01-13 | Stop reason: HOSPADM

## 2017-01-10 RX ORDER — PREDNISONE 10 MG/1
10 TABLET ORAL
Status: COMPLETED | OUTPATIENT
Start: 2017-01-11 | End: 2017-01-11

## 2017-01-10 RX ORDER — PREDNISONE 10 MG/1
10 TABLET ORAL
Status: DISCONTINUED | OUTPATIENT
Start: 2017-01-11 | End: 2017-01-13 | Stop reason: HOSPADM

## 2017-01-10 RX ORDER — PREDNISONE 10 MG/1
10 TABLET ORAL
Status: DISCONTINUED | OUTPATIENT
Start: 2017-01-18 | End: 2017-01-13 | Stop reason: HOSPADM

## 2017-01-10 RX ORDER — PREDNISONE 20 MG/1
20 TABLET ORAL
Status: DISCONTINUED | OUTPATIENT
Start: 2017-01-10 | End: 2017-01-10 | Stop reason: SDUPTHER

## 2017-01-10 RX ORDER — DIPHENHYDRAMINE HYDROCHLORIDE 50 MG/ML
12.5 INJECTION, SOLUTION INTRAMUSCULAR; INTRAVENOUS
Status: DISCONTINUED | OUTPATIENT
Start: 2017-01-10 | End: 2017-01-13 | Stop reason: HOSPADM

## 2017-01-10 RX ORDER — PREDNISONE 20 MG/1
20 TABLET ORAL
Status: DISCONTINUED | OUTPATIENT
Start: 2017-01-11 | End: 2017-01-13 | Stop reason: HOSPADM

## 2017-01-10 RX ORDER — ASPIRIN 325 MG
325 TABLET, DELAYED RELEASE (ENTERIC COATED) ORAL
Status: DISCONTINUED | OUTPATIENT
Start: 2017-01-11 | End: 2017-01-11

## 2017-01-10 RX ORDER — PREDNISONE 20 MG/1
20 TABLET ORAL
Status: COMPLETED | OUTPATIENT
Start: 2017-01-11 | End: 2017-01-11

## 2017-01-10 RX ORDER — PREDNISONE 20 MG/1
20 TABLET ORAL
Status: COMPLETED | OUTPATIENT
Start: 2017-01-11 | End: 2017-01-13

## 2017-01-10 RX ORDER — ACETAMINOPHEN 325 MG/1
650 TABLET ORAL
Status: DISCONTINUED | OUTPATIENT
Start: 2017-01-10 | End: 2017-01-13 | Stop reason: HOSPADM

## 2017-01-10 RX ORDER — NALOXONE HYDROCHLORIDE 0.4 MG/ML
0.1 INJECTION, SOLUTION INTRAMUSCULAR; INTRAVENOUS; SUBCUTANEOUS AS NEEDED
Status: DISCONTINUED | OUTPATIENT
Start: 2017-01-10 | End: 2017-01-13 | Stop reason: HOSPADM

## 2017-01-10 RX ORDER — DIPHENHYDRAMINE HCL 25 MG
25 CAPSULE ORAL
Status: DISCONTINUED | OUTPATIENT
Start: 2017-01-10 | End: 2017-01-13 | Stop reason: HOSPADM

## 2017-01-10 RX ORDER — DEXTROSE, SODIUM CHLORIDE, SODIUM LACTATE, POTASSIUM CHLORIDE, AND CALCIUM CHLORIDE 5; .6; .31; .03; .02 G/100ML; G/100ML; G/100ML; G/100ML; G/100ML
50 INJECTION, SOLUTION INTRAVENOUS CONTINUOUS
Status: DISCONTINUED | OUTPATIENT
Start: 2017-01-10 | End: 2017-01-10

## 2017-01-10 RX ADMIN — TOLTERODINE TARTRATE 2 MG: 2 TABLET, FILM COATED ORAL at 08:20

## 2017-01-10 RX ADMIN — FUROSEMIDE 40 MG: 40 TABLET ORAL at 08:20

## 2017-01-10 RX ADMIN — FLUTICASONE PROPIONATE AND SALMETEROL 1 PUFF: 50; 250 POWDER RESPIRATORY (INHALATION) at 20:43

## 2017-01-10 RX ADMIN — GUAIFENESIN 600 MG: 600 TABLET, EXTENDED RELEASE ORAL at 20:41

## 2017-01-10 RX ADMIN — GABAPENTIN 300 MG: 300 CAPSULE ORAL at 17:57

## 2017-01-10 RX ADMIN — PREDNISONE 20 MG: 20 TABLET ORAL at 11:04

## 2017-01-10 RX ADMIN — FLUTICASONE PROPIONATE AND SALMETEROL 1 PUFF: 50; 250 POWDER RESPIRATORY (INHALATION) at 11:04

## 2017-01-10 RX ADMIN — NYSTATIN: 100000 POWDER TOPICAL at 11:04

## 2017-01-10 RX ADMIN — INSULIN LISPRO 2 UNITS: 100 INJECTION, SOLUTION INTRAVENOUS; SUBCUTANEOUS at 12:16

## 2017-01-10 RX ADMIN — POLYETHYLENE GLYCOL 3350 17 G: 17 POWDER, FOR SOLUTION ORAL at 08:20

## 2017-01-10 RX ADMIN — METHYLPREDNISOLONE SODIUM SUCCINATE 40 MG: 40 INJECTION, POWDER, FOR SOLUTION INTRAMUSCULAR; INTRAVENOUS at 07:00

## 2017-01-10 RX ADMIN — GUAIFENESIN 600 MG: 600 TABLET, EXTENDED RELEASE ORAL at 08:20

## 2017-01-10 RX ADMIN — LORATADINE 10 MG: 10 TABLET ORAL at 08:20

## 2017-01-10 RX ADMIN — HYDROCODONE BITARTRATE AND ACETAMINOPHEN 1 TABLET: 7.5; 325 TABLET ORAL at 18:38

## 2017-01-10 RX ADMIN — LEVOFLOXACIN 500 MG: 5 INJECTION, SOLUTION INTRAVENOUS at 00:52

## 2017-01-10 RX ADMIN — TOLTERODINE TARTRATE 2 MG: 2 TABLET, FILM COATED ORAL at 20:41

## 2017-01-10 RX ADMIN — INSULIN LISPRO 2 UNITS: 100 INJECTION, SOLUTION INTRAVENOUS; SUBCUTANEOUS at 17:59

## 2017-01-10 RX ADMIN — FLUTICASONE PROPIONATE 2 SPRAY: 50 SPRAY, METERED NASAL at 11:04

## 2017-01-10 RX ADMIN — PANTOPRAZOLE SODIUM 40 MG: 40 TABLET, DELAYED RELEASE ORAL at 08:20

## 2017-01-10 RX ADMIN — POTASSIUM CHLORIDE 20 MEQ: 20 TABLET, EXTENDED RELEASE ORAL at 08:20

## 2017-01-10 RX ADMIN — CLINDAMYCIN PHOSPHATE 900 MG: 150 INJECTION, SOLUTION, CONCENTRATE INTRAVENOUS at 16:14

## 2017-01-10 RX ADMIN — Medication 10 ML: at 16:15

## 2017-01-10 RX ADMIN — BUSPIRONE HYDROCHLORIDE 10 MG: 5 TABLET ORAL at 08:21

## 2017-01-10 RX ADMIN — BUSPIRONE HYDROCHLORIDE 10 MG: 5 TABLET ORAL at 20:41

## 2017-01-10 RX ADMIN — SERTRALINE HYDROCHLORIDE 100 MG: 50 TABLET ORAL at 08:20

## 2017-01-10 RX ADMIN — CLINDAMYCIN PHOSPHATE 900 MG: 150 INJECTION, SOLUTION, CONCENTRATE INTRAVENOUS at 19:37

## 2017-01-10 RX ADMIN — DOCUSATE SODIUM 100 MG: 100 CAPSULE, LIQUID FILLED ORAL at 08:20

## 2017-01-10 RX ADMIN — NYSTATIN: 100000 POWDER TOPICAL at 22:38

## 2017-01-10 RX ADMIN — INSULIN LISPRO 2 UNITS: 100 INJECTION, SOLUTION INTRAVENOUS; SUBCUTANEOUS at 22:34

## 2017-01-10 RX ADMIN — ROPINIROLE 1 MG: 1 TABLET, FILM COATED ORAL at 17:57

## 2017-01-10 RX ADMIN — Medication 5 ML: at 22:00

## 2017-01-10 RX ADMIN — TRAZODONE HYDROCHLORIDE 100 MG: 100 TABLET, FILM COATED ORAL at 22:33

## 2017-01-10 RX ADMIN — METHYLPREDNISOLONE SODIUM SUCCINATE 40 MG: 40 INJECTION, POWDER, FOR SOLUTION INTRAMUSCULAR; INTRAVENOUS at 00:40

## 2017-01-10 RX ADMIN — GABAPENTIN 600 MG: 300 CAPSULE ORAL at 22:34

## 2017-01-10 RX ADMIN — Medication 10 ML: at 16:16

## 2017-01-10 NOTE — PROGRESS NOTES
Problem: Self Care Deficits Care Plan (Adult)  Goal: *Acute Goals and Plan of Care (Insert Text)  Occupational Therapy Goals  Initiated 1/6/2017 within 7 day(s). 1. Patient will perform grooming with supervision/set-up   2. Patient will perform upper body dressing with minimal assistance/contact guard assist.  3. Patient will perform lower body dressing with moderate assistance . 4. Patient will participate in UE exercises to improve UE ROM and strength increased independence with ADLs (RUE only)  5. Patient will perform toilet transfer with moderate assistance     Occupational Therapy Goals  Initiated 1/10/2017 within 7 day(s). 1. Patient will perform grooming with supervision/set-up   2. Patient will perform upper body dressing with minimal assistance/contact guard assist.  3. Patient will perform lower body dressing with moderate assistance . 4. Patient will participate in UE exercises to improve UE ROM and strength increased independence with ADLs  5. Patient will perform toilet transfer with supervision  6. Patient will perform all aspects of toileting with supervision  Outcome: Progressing Towards Goal  OCCUPATIONAL THERAPY REEVALUATION     Patient: Madison Montes (80 y.o. female)  Date: 1/10/2017  Diagnosis: fall  SHOULDER FRACTURE  Frequent falls <principal problem not specified>  Procedure(s) (LRB):  REVERSE LEFT TOTAL SHOULDER ARTHROPLASTY (Left) 1 Day Post-Op  Precautions: Fall      ASSESSMENT :  Based on the objective data described below, the patient presents with continued decreased ability to perform ADLs due increased pain, decreased functional balance, decreased functional strength, decreased use of LUE, and decreased overall activity tolerance. Pt completed supine to sit transfer with min A. While seated EOB, pt required max A to comb hair. Pt required max A to doff sling. Pt required max A to adjust gown. Pt completed UE exercises while seated EOB. Pt required max A to don sling.  Pt demonstrated ability to perform sit to stand transfer with min A. Pt able to take a few side steps at EOB with CGA-min A. Pt required mod A to return to supine for LE management. Pt left supine in bed with needs in reach. Education: reviewed plan of care        Patient will benefit from skilled intervention to address the above impairments. Patients rehabilitation potential is considered to be Good  Factors which may influence rehabilitation potential include:   [ ]                None noted  [ ]                Mental ability/status  [X]                Medical condition  [ ]                Home/family situation and support systems  [ ]                Safety awareness  [X]                Pain tolerance/management  [ ]                Other:           PLAN :  Recommendations and Planned Interventions:  [X]                  Self Care Training                  [X]           Therapeutic Activities  [X]                  Functional Mobility Training    [ ]           Cognitive Retraining  [X]                  Therapeutic Exercises           [X]           Endurance Activities  [X]                  Balance Training                   [X]           Neuromuscular Re-Education  [ ]                  Visual/Perceptual Training     [X]      Home Safety Training  [X]                  Patient Education                 [X]           Family Training/Education  [ ]                  Other (comment):     Frequency/Duration: Patient will be followed by occupational therapy 3-5 times a week to address goals. Discharge Recommendations: Bryan Marmolejo  Further Equipment Recommendations for Discharge:TBD by next level ofc are       SUBJECTIVE:   Patient stated .      OBJECTIVE DATA SUMMARY:   Hospital course since last seen and reason for reevaluation: Since initial evaluation, pt had reverse total shoulder arthroplasty completed and transferred to ICU s/p surgery.  Due to surgical intervention, pt now requiring re-evaluation  GCODES(GO):n/a     Cognitive/Behavioral Status:  Neurologic State: Alert  Orientation Level: Oriented X4  Cognition: Appropriate decision making, Follows commands  Safety/Judgement: Fall prevention     Coordination:  Coordination: Generally decreased, functional  Fine Motor Skills-Upper: Left Intact; Right Intact    Gross Motor Skills-Upper: Left Impaired;Right Intact  Balance:  Sitting: Intact  Standing: Impaired  Standing - Static: Fair  Standing - Dynamic : Fair  Strength:  Strength: Generally decreased, functional (L shoulder not WFL)     Range of Motion:  AROM: Generally decreased, functional (L shoulder not WFL)  PROM: Generally decreased, functional     Functional Mobility and Transfers for ADLs:  Bed Mobility:  Supine to Sit: Minimum assistance  Sit to Supine: Moderate assistance     Transfers:  Sit to Stand: Minimum assistance                 ADL Assessment:  Oral Facial Hygiene/Grooming: Maximum assistance (combing hair)     Upper Body Dressing: Maximum assistance     Lower Body Dressing: Maximum assistance     ADL Intervention:  Grooming  Grooming Assistance: Maximum assistance  Brushing/Combing Hair: Maximum assistance     Upper Body Dressing Assistance  Dressing Assistance: Maximum assistance  Hospital Gown: Maximum assistance     Lower Body Dressing Assistance  Dressing Assistance: Maximum assistance  Socks: Maximum assistance     Cognitive Retraining  Safety/Judgement: Fall prevention  Pain:   Pre treatment pain level:   Post treatment pain level:    Pain Scale 1: Numeric (0 - 10)  Pain Intensity 1: 4     Activity Tolerance:   good  Please refer to the flowsheet for vital signs taken during this treatment.   After treatment:   [ ] Patient left in no apparent distress sitting up in chair  [X] Patient left in no apparent distress in bed  [X] Call bell left within reach  [ ] Nursing notified  [ ] Caregiver present  [ ] Bed alarm activated      COMMUNICATION/EDUCATION:   [ ]    Home safety education was provided and the patient/caregiver indicated understanding. [X]    Patient/family have participated as able in goal setting and plan of care. [X]    Patient/family agree to work toward stated goals and plan of care. [ ]    Patient understands intent and goals of therapy, but is neutral about his/her participation. [ ]    Patient is unable to participate in goal setting and plan of care. This patients plan of care is appropriate for delegation to [facility].      Thank you for this referral.  Angely Bahena MS OTR/L  Time Calculation: 31 mins

## 2017-01-10 NOTE — PROGRESS NOTES
INITIAL NUTRITION ASSESSMENT     RECOMMENDATIONS/PLAN:   - Suggest resuming Consistent Carb diet instead of Regular as current HbA1c 5.9% is indicative of pre-diabetes  - Continue Glucerna Shakes TID while appetite is decreased    REASON FOR ASSESSMENT:   [x] LOS    NUTRITION ASSESSMENT:   Client History: 61 yrs old Female admitted with humerus fx, s/p shoulder surgery yesterday. Pt also with atelectasis and chronic hypoxia due to hypoventilation, UTI, fatty liver. Current Hba1c meets criteria for pre-diabetes. Pt is immobile and morbidly obese, anticipating d/c to rehab. PMHx: asthma, DDD, depression, DM, DVT, fatty liver, GERD, HLD, osteopenia, spinal stenosis, uterine ca, gastric bypass   Cultural/Muslim Food Preferences: None Identified    FOOD/NUTRITION HISTORY  Diet History: no appetite changes PTA   Food Allergies:  [x] NKFA     [] Yes      NUTRITION INTAKE   Diet Order:  Regular      Average PO Intake:   25-50%    Patient Vitals for the past 100 hrs:   % Diet Eaten   01/10/17 1234 30 %   01/10/17 1043 40 %   01/08/17 1822 50 %   01/08/17 1400 25 %   01/08/17 0955 85 %   01/07/17 0912 50 %   01/06/17 1832 25 %   01/06/17 1242 50 %   Pertinent Medications:  [x] Reviewed; D5/LR, K-dur, ppi, miralax, colace, norco, dilaudid, gabapentin, abx, prednisone, lasix  Insulin:  [x] SSI   [] Pre-meal:   []  Basal:   [] None    Pt expected to meet estimated nutrient needs through next review:          [x]  Yes     [] No;  ANTHROPOMETRICS  Height: 5' 3\" (160 cm)       Weight: 139.3 kg (307 lb 1.6 oz)    BMI: 55 kg/m^2  -  morbidly obese (Greater than or = to 40% BMI)        Weight change: stable per pt                                  Comparison to Reference Standards:  IBW: 115 lbs      %IBW: 267%      AdjBW: 74 kg    NUTRITION-FOCUSED PHYSICAL ASSESSMENT  Skin: incision to L shoulder, laceration to R leg, otherwise intact. Mesha Martinezpool      GI: no N/V/D    BIOCHEMICAL DATA & MEDICAL TESTS  Pertinent Labs:  [x] Reviewed; POC BG , CO2 33, Ca 8.2, , , , HbA1c 5.9, Alb 2     NUTRITION PRESCRIPTION  Calories: 0229-4039 kcal/day based on MSJ x1.2 -500  Protein: 74-96 g/day based on 1-1.3 g/kg AdjBW  CHO: 225 g/day based on 50% of total energy  Fluid: 7835-3978 ml/day based on 1 kcal/ml      NUTRITION DIAGNOSES:   1- Overweight/obesity related to h/o excessive energy intake and inadequate physical activity as evidenced by BMI 55 kg/m^2 and 267% IBW. 2- At risk for inadequate oral intake related to hypoventilation as evidenced by PO intake <50% of most meals since adm. NUTRITION INTERVENTIONS:   INTERVENTIONS:        GOALS:  1. Switch to Consistent Carb diet, continue Glucerna Shakes TID 1. >50% PO intake meals/supplements, maintain skin integrity, BM q 1-3 days, POC -180 mg/dl in ICU by next review 3-5 days     LEARNING NEEDS (Diet, Supplementation, Food/Nutrient-Drug Interaction):   [] None Identified  [] Education provided/documented (refer to Education section of EMR)  [x] Identified and patient:  [] Declined     [x] Was not appropriate/indicated  NUTRITION MONITORING /EVALUATION:   Follow PO intake  Monitor wt  Monitor for additional supplement needs    NUTRITION RISK:   []  High                         [x]  Moderate                         []  Minimal/Uncompromised    [x] Participated in Interdisciplinary Rounds  [x] [address] [place] [address] Reviewed/Documented  [x] Discharge Nutrition Plan: consistent carb 1800 kcal diet     Will follow-up per Moderate Risk policy.   Alcira Vega RD  PAGER:  [phone]

## 2017-01-10 NOTE — ROUTINE PROCESS
TRANSFER - OUT REPORT:    Verbal report given to [address]) on [employer]  being transferred to medical(unit) for routine progression of care       Report consisted of patients Situation, Background, Assessment and   Recommendations(SBAR). Information from the following report(s) SBAR, Kardex, Procedure Summary, Intake/Output and MAR was reviewed with the receiving nurse. Lines:   Triple Lumen 01/09/17 Right Internal jugular (Active)   Central Line Being Utilized Yes 1/10/2017  4:30 AM   Criteria for Appropriate Use Hemodynamically unstable, requiring monitoring lines, vasopressors, or volume resuscitation 1/10/2017  4:30 AM   Site Assessment Clean, dry, & intact 1/10/2017  4:30 AM   Infiltration Assessment 0 1/10/2017  4:30 AM   Affected Extremity/Extremities Color distal to insertion site pink (or appropriate for race) 1/10/2017  4:30 AM   Date of Last Dressing Change 01/09/17 1/10/2017  4:30 AM   Dressing Status Clean, dry, & intact 1/10/2017  4:30 AM   Dressing Type Tape;Transparent 1/10/2017  4:30 AM   Action Taken Open ports on tubing capped 1/10/2017  4:30 AM   Proximal Hub Color/Line Status White;Capped 1/10/2017  4:30 AM   Positive Blood Return (Medial Site) Yes 1/10/2017  4:30 AM   Medial Hub Color/Line Status Blue; Infusing 1/10/2017  4:30 AM   Positive Blood Return (Lateral Site) Yes 1/10/2017  4:30 AM   Distal Hub Color/Line Status Brown 1/10/2017  4:30 AM   Positive Blood Return (Site #3) Yes 1/10/2017  4:30 AM   Alcohol Cap Used Yes 1/10/2017  4:30 AM       Peripheral IV 01/09/17 Right;Upper Arm (Active)   Site Assessment Clean, dry, & intact 1/10/2017  4:30 AM   Phlebitis Assessment 0 1/10/2017  4:30 AM   Infiltration Assessment 0 1/10/2017  4:30 AM   Dressing Status Clean, dry, & intact 1/10/2017  4:30 AM   Dressing Type Tape;Transparent 1/10/2017  4:30 AM   Hub Color/Line Status Capped 1/10/2017  4:30 AM   Alcohol Cap Used Yes 1/10/2017  4:30 AM        Opportunity for questions and clarification was provided.       Patient transported with:   [employer]

## 2017-01-10 NOTE — ROUTINE PROCESS
TRANSFER - IN REPORT:    Verbal report received from [facility] RN(name) on [employer]  being received from ICU(unit) for routine progression of care      Report consisted of patients Situation, Background, Assessment and   Recommendations(SBAR). Information from the following report(s) SBAR, Procedure Summary, Intake/Output, MAR and Recent Results was reviewed with the receiving nurse. Opportunity for questions and clarification was provided. Assessment completed upon patients arrival to unit and care assumed.

## 2017-01-10 NOTE — PROGRESS NOTES
[facility]  Pulmonary, Critical Care, and Sleep Medicine         Name: Minesh Ellsworth MRN: [id number]   : 1950 Hospital: [facility] [place]   Date: 1/10/2017        Subjective:   1/10  Pt tolerated the surgery yesterday. Post op was on bipap. Last night was able to take the bipap off. Currently on venturi. Pain tolerable with pca. No n/v/d. This patient has been seen and evaluated at the request of Dr. Wilhelmina Brittle  for pre-op clearance. Patient is a 61 y.o. female with morbid obesity, mostly wheel chair bound, chronic controlled asthma, Admitted on 1/3 following fall out of wheel chair  Sustained left humerus fx. Pt apparently has been cleared for surgery from cardiac standpoint. But surgery is pending pulmonary clearance due to persistent cxr abnormalities. Pt reports that she is at her usual state except for pain left arm. No wheezing. No fever or chills. Chronic nonproductive cough. No headache. No changes in respiratory status. Past Medical History   Diagnosis Date    Active rheumatic fever with cardiac involvement     Anemia     Asthma     Chronic back pain     DDD (degenerative disc disease), cervical     Depression     Diabetes (HCC)     DVT (deep vein thrombosis) in pregnancy ([facility] [address]     Edema     Factor V Leiden mutation ([facility] [address]     Fatty liver disease, non-alcoholic     GERD (gastroesophageal reflux disease)     Heart abnormality     HNP (herniated nucleus pulposus)     Hypercholesterolemia     Knee pain     Lymphedema     Osteopenia     RLS (restless legs syndrome)     Spinal stenosis     Uterine endometrial cancer, sarcoma (HCC)       Past Surgical History   Procedure Laterality Date    Hx gastric bypass       In [facility] many years ago.  Hx darryl and bso      Hx carpal tunnel release      Hx gastric bypass      Pr lap,cholecystectomy        Prior to Admission medications    Medication Sig Start Date End Date Taking? Authorizing Provider   glucosamine-chondroitin (ARTHX) 500-400 mg cap Take 1 Cap by mouth daily. Yes Historical Provider   HYDROcodone-acetaminophen (NORCO) 7.5-325 mg per tablet Take 1 Tab by mouth every six (6) hours as needed for Pain. Max Daily Amount: 4 Tabs. 1/3/17  Yes Louise Segura MD   budesonide-formoterol (SYMBICORT) 160-4.5 mcg/actuation HFA inhaler Take 2 Puffs by inhalation two (2) times a day. Yes Historical Provider   gabapentin (NEURONTIN) 100 mg capsule Take 300 mg by mouth two (2) times a day. 300 mg in evening, then 600 mg at bedtime  Indications: NEUROPATHIC PAIN   Yes Historical Provider   albuterol (PROVENTIL HFA, VENTOLIN HFA, PROAIR HFA) 90 mcg/actuation inhaler Take  by inhalation. Yes Historical Provider   fluticasone (FLONASE) 50 mcg/actuation nasal spray 2 Sprays by Both Nostrils route daily. Yes Historical Provider   risedronate (ACTONEL) 150 mg tablet Take 150 mg by mouth every thirty (30) days. Yes Historical Provider   rOPINIRole (REQUIP) 1 mg tablet Take 2 mg by mouth three (3) times daily. 1 mg in evening (1900), then 2 mg at bedtime (0100)  Indications: Restless Legs Syndrome   Yes Historical Provider   pravastatin (PRAVACHOL) 40 mg tablet Take 40 mg by mouth nightly. Yes Historical Provider   tolterodine (DETROL) 2 mg tablet Take 1 Tab by mouth two (2) times a day. Indications: BLADDER HYPERACTIVITY 9/21/16  Yes Ricci Posada MD   enoxaparin (LOVENOX) 60 mg/0.6 mL injection 60 mg by SubCUTAneous route daily. Yes Cedrick Ashraf MD   loratadine (CLARITIN) 10 mg tablet Take 10 mg by mouth daily. Yes Historical Provider   BUSPIRONE HCL (BUSPAR PO) Take 10 mg by mouth two (2) times a day. Yes Historical Provider   FUROSEMIDE (LASIX PO) Take 20 mg by mouth daily. Yes Historical Provider   MONTELUKAST SODIUM (SINGULAIR PO) Take  by mouth. Yes Historical Provider   TRAMADOL HCL (TRAMADOL PO) Take 50 mg by mouth four (4) times daily.    Yes Historical Provider   TRAZODONE HCL (TRAZODONE PO) Take 200 mg by mouth nightly as needed for Other (sleep). Yes Historical Provider   SERTRALINE HCL (ZOLOFT PO) Take 100 mg by mouth daily. Yes Historical Provider   multivitamin (ONE A DAY) tablet Take 1 Tab by mouth daily. Historical Provider   calcium-vitamin D (OYSTER SHELL) 500 mg(1,250mg) -200 unit per tablet Take 1 Tab by mouth two (2) times daily (with meals). Historical Provider   omega-3 fatty acids-vitamin e (FISH OIL) 1,000 mg cap Take 1 Cap by mouth. Historical Provider   flaxseed oil 1,000 mg cap Take  by mouth. Historical Provider   acetaminophen (TYLENOL ARTHRITIS PAIN) 650 mg CR tablet Take 650 mg by mouth every six (6) hours as needed for Pain. Historical Provider     Allergies   Allergen Reactions    Augmentin [Amoxicillin-Pot Clavulanate] Other (comments)     Sever diarrhea    Codeine Unknown (comments)    Lodine [Etodolac] Unknown (comments)      Social History   Substance Use Topics    Smoking status: Never Smoker    Smokeless tobacco: Never Used    Alcohol use No      History reviewed. No pertinent family history.      Current Facility-Administered Medications   Medication Dose Route Frequency    polyethylene glycol (MIRALAX) packet 17 g  17 g Oral DAILY    sertraline (ZOLOFT) tablet 100 mg  100 mg Oral DAILY    busPIRone (BUSPAR) tablet 10 mg  10 mg Oral BID    pantoprazole (PROTONIX) tablet 40 mg  40 mg Oral DAILY    methylPREDNISolone (PF) (SOLU-MEDROL) injection 40 mg  40 mg IntraVENous Q6H    lactated ringers infusion 1,000 mL  1,000 mL IntraVENous CONTINUOUS    HYDROmorphone (PF) (DILAUDID) 30 mg / 30 mL PCA   IntraVENous TITRATE    guaiFENesin SR (MUCINEX) tablet 600 mg  600 mg Oral Q12H    docusate sodium (COLACE) capsule 100 mg  100 mg Oral DAILY    nystatin (MYCOSTATIN) 100,000 unit/gram powder   Topical BID    furosemide (LASIX) tablet 40 mg  40 mg Oral DAILY    rOPINIRole (REQUIP) tablet 2 mg  2 mg Oral QHS  insulin lispro (HUMALOG) injection   SubCUTAneous AC&HS    gabapentin (NEURONTIN) capsule 300 mg  300 mg Oral QPM    gabapentin (NEURONTIN) capsule 600 mg  600 mg Oral QHS    rOPINIRole (REQUIP) tablet 1 mg  1 mg Oral QPM    potassium chloride (K-DUR, KLOR-CON) SR tablet 20 mEq  20 mEq Oral DAILY    clindamycin phosphate 900 mg in 0.9% sodium chloride (MBP/ADV) 100 mL ADV  900 mg IntraVENous ON CALL TO OR    diph,Freedom(AC),Tet Vac-PF (BOOSTRIX) suspension 0.5 mL  0.5 mL IntraMUSCular PRIOR TO DISCHARGE    fluticasone-salmeterol (ADVAIR) 250mcg-50mcg/puff  1 Puff Inhalation BID    fluticasone (FLONASE) 50 mcg/actuation nasal spray 2 Spray  2 Spray Both Nostrils DAILY    tolterodine (DETROL) tablet 2 mg  2 mg Oral BID    loratadine (CLARITIN) tablet 10 mg  10 mg Oral DAILY    levoFLOXacin (LEVAQUIN) 500 mg in D5W IVPB  500 mg IntraVENous Q24H       Review of Systems:  HEENT: No epistaxis, no nasal drainage, no difficulty in swallowing  Respiratory: as above  Cardiovascular: no chest pain, no palpitations, no chronic leg edema, no syncope  Gastrointestinal: no abd pain, no vomiting, no diarrhea, no bleeding symptoms  Genitourinary: No urinary symptoms  Integument/breast: No ulcers  Musculoskeletal:Neg  Neurological: No focal weakness, no seizures, no headaches  Behvioral/Psych: No anxiety, no depression  Constitutional: No fever, no chills, no weight loss, no night sweats  SLEEP: No snoring, no witnessed apneas, no daytime somnolence, no morning headaches. Sleep refreshed.     Objective:   Vital Signs:    Visit Vitals    /57    Pulse 87    Temp 98.2 °F (36.8 °C)    Resp 16    Ht 5' 3\" (1.6 m)    Wt 139.3 kg (307 lb 1.6 oz)    SpO2 93%    BMI 54.4 kg/m2       O2 Device: BIPAP   O2 Flow Rate (L/min): 2 l/min   Temp (24hrs), Av.4 °F (36.9 °C), Min:98 °F (36.7 °C), Max:99 °F (37.2 °C)       Intake/Output:     Intake/Output Summary (Last 24 hours) at 01/10/17 9985  Last data filed at 01/10/17 0600   Gross per 24 hour   Intake          3772.92 ml   Output              925 ml   Net          2847.92 ml         Physical Exam:   General: comfortable  Neck: No adenopathy or thyroid swelling  CVS: S1S2 no murmurs  RS: Mod AE bilaterally, decreased BS with crackles at bases  Abd: soft, non tender, no hepatosplenomegaly  Neuro: non focal, awake, alert  Extrm: no leg edema   Skin: no rash    Data review:   Labs:  Recent Labs      01/10/17   0530  01/09/17   0804   WBC  8.8  8.1   HGB  11.6*  12.7   HCT  37.4  40.5   PLT  253  245     Recent Labs      01/10/17   0530  01/09/17   0804   NA  139  139   K  5.2  4.5   CL  102  101   CO2  33*  32   GLU  140*  109*   BUN  14  17   CREA  0.76  0.77   CA  8.2*  8.8   ALB  2.0*  2.1*   SGOT  315*  102*   ALT  222*  122*     No results for input(s): PH, PCO2, PO2, HCO3, FIO2 in the last 72 hours. Imaging:  I have personally reviewed the patients radiographs and have reviewed the reports: Allergies        Unspecified: Augmentin [Amoxicillin-pot Clavulanate]; Codeine; Lodine [Etodolac]       Result Information      Status Provider Status        Final result (Exam End: 1/9/2017  8:29 AM) Open        Study Result      Chest, single view     Indication: Pneumonia, follow-up examination     Comparison: Several prior examinations, most recently 1/6/2017     Findings: Portable upright AP view of the chest was obtained. The lungs are  progressively underexpanded with redemonstration of bibasilar airspace  opacities, right greater than left. Linear atelectasis noted, similar to prior. No pneumothorax or large pleural effusion. Cardiac size and mediastinal contours  are stable. No new acute osseous abnormality demonstrated, with similar  radiographic appearance to a comminuted proximal left humeral fracture.     IMPRESSION  Impression:  Progressively underexpanded lungs with right basilar airspace opacity which may  represent atelectasis or pneumonia. IMPRESSION:   · Rt LL atelectasis,  I don't think this is pneumonia.   Primarily hypoventilation issues  · Morbid obesity  · Acute left humerus fx s/p repair  · Chronic hypoxia due to hypoventilation and possibly yaw  · Hx of factor V Leiden      RECOMMENDATIONS:   · Wean steroid  · Continue bd  · Continue abx for uti  · Continue lovenox of dvt proph and scd  · ssi  · Ok to transfer to post op unit     Cc time 30min       Ortiz Boston MD

## 2017-01-10 NOTE — PROGRESS NOTES
2020 Patient arrived via bed from PACU with Dilaudid PCA pump. Left shoulder sling in place. 2045 Admission assessment completed, see EMR. Patient has multiple bruises to left shoulder, right forearm, and right leg. Left shoulder surgical sites covered with mepelix. Right leg laceration has stitches and covered with mepilex. Right breast has mepilex in place. 2245 Patient was     Wyatt Roy to notify him that patient was not tolerating the bipap even after using a smaller mask patient was will complaining of feeling like she was suffocation and she cant breath. Spo2 at 96% at this time. Order was given to place patient on nasal cannular. 2315 Patient was placed on 4L NC and her Spo2 was 91-93%    2340 Patient  Spo2 dropped to 89-91% on  4L NC. Patient was then placed on a Ventri mask 50%/12L which patient tolerated well Spo2 above 91%.     0045 Reassessment completed, see EMR.     0430 Reassessment completed, see EMR

## 2017-01-10 NOTE — PROGRESS NOTES
Problem: Mobility Impaired (Adult and Pediatric)  Goal: *Acute Goals and Plan of Care (Insert Text)  Physical Therapy Goals  Initiated 1/5/2017 to be met within 2 days  STGs:  1. Rolling toward right with min/mod assist; Supine to/from supine with min assist in prep for ADL activity. 2. Tolerated sitting EOB 10+min for ADL activity. Physical Therapy Goals  Initiated 1/10/2017 and to be accomplished within 7 day(s)  Supine to sit to sit CGA/S with HR for positioning. Transfers: Sit to stand to chair CGA/S with LRAD for ADLs. Gait: Ambulate >100ft CGA/S with LRAD, no LOB, for home/community mobility. Stair Negotiation: Ascend/descend 6-inch box step min A/CGA with HR/HHA x 3 completions for home entry. Patient/Family Education: Independent with HEP and demonstrate competency with don/doff sling for follow-up care and safe discharge. Outcome: Progressing Towards Goal  PHYSICAL THERAPY RE-EVALUATION AND TREATMENT     Patient: Travis English (42 y.o. female)  Date: 1/10/2017  Diagnosis: fall  SHOULDER FRACTURE  Frequent falls <principal problem not specified>  Procedure(s) (LRB):  REVERSE LEFT TOTAL SHOULDER ARTHROPLASTY (Left) 1 Day Post-Op  Precautions: Fall      ASSESSMENT:  Patient continues present to PT with functional mobility impairments with regard to bed mobility, transfers, gait, stair negotiation, balance, weakness, and overall tolerance for activity secondary to L UE weakness and decreased AROM s/p R reverse TSA. Pt very motivated to participate with PT at this time. Educated pt on donning/doffing TLSO as well as L UE therex. Pt did require max visual and verbal cuing to complete therex. Pt continues to require increased assistance with bed mobility, and functional transfers. During gait training pt ambulated a total of 3 feet with a seated rest break due to decreased tolerance to activity. Left pt in bed with all needs met, HOB elevated, ice pack to L shoulder and SCDs applied. Recommend SNF at time of discharge. Patient's progression toward goals since last assessment: Fair       PLAN:  Goals have been updated based on progression since last assessment. Patient continues to benefit from skilled intervention to address the above impairments. Continue to follow the patient 1-2 times per day 7 days per week to address goals. Planned Interventions:  [X]     Bed Mobility Training          [X]     Neuromuscular Re-Education  [X]     Transfer Training                [ ]    Orthotic/Prosthetic Training  [X]     Gait Training                       [ ]     Modalities  [X]     Therapeutic Exercises       [ ]     Edema Management/Control  [X]     Therapeutic Activities         [X]     Patient and Family Training/Education  [ ]     Other (comment):  Discharge Recommendations: Skilled Nursing Facility  Further Equipment Recommendations for Discharge: TBD       SUBJECTIVE:   Patient stated I feel so much better now that I've had surgery.       OBJECTIVE DATA SUMMARY:   Critical Behavior:  Neurologic State: Alert  Orientation Level: Oriented X4  Cognition: Appropriate decision making, Follows commands  Safety/Judgement: Fall prevention  Functional Mobility Training:  Bed Mobility:  Rolling: Minimum assistance  Supine to Sit: Minimum assistance  Sit to Supine: Moderate assistance   Transfers:  Sit to Stand: Minimum assistance  Stand to Sit: Contact guard assistance  Balance:  Sitting: Intact  Standing: Impaired  Standing - Static: Fair  Standing - Dynamic : Fair  Ambulation/Gait Training:  Distance (ft): 3 Feet (ft)  Assistive Device: Other (comment);Gait belt (Sling and HHA)  Ambulation - Level of Assistance: Minimal assistance   Gait Abnormalities: Decreased step clearance;Shuffling gait   Base of Support: Widened  Stance: Weight shift  Speed/Gemini: Slow;Shuffled  Step Length: Right shortened;Left shortened  Swing Pattern: Right asymmetrical;Left asymmetrical   Interventions: Visual/Demos; Verbal cues;Tactile cues; Safety awareness training   Therapeutic Exercises:   HEP included ankle pumps, wrist/hand/elbow AROM in all directions AROM, AAROM shoulder flexion x 10 reps  Pain:  Pain Scale 1: Numeric (0 - 10)  Pain Intensity 1: 0   Activity Tolerance:   Fair  Please refer to the flowsheet for vital signs taken during this treatment.   After treatment:   [ ]  Patient left in no apparent distress sitting up in chair  [X]  Patient left in no apparent distress in bed  [X]  Call bell left within reach  [X]  Nursing notified  [ ]  Caregiver present  [ ]  Bed alarm activated     Gurvinder Ardon PT, DPT      Time Calculation: 32 mins

## 2017-01-10 NOTE — ROUTINE PROCESS
TRANSFER - IN REPORT:    Verbal report received from MALIKA Nguyen RN(name) on Prema Slater  being received from PACU (unit) for routine progression of care      Report consisted of patients Situation, Background, Assessment and   Recommendations(SBAR). Information from the following report(s) SBAR, Kardex, Intake/Output, MAR and Recent Results was reviewed with the receiving nurse. Opportunity for questions and clarification was provided.

## 2017-01-10 NOTE — PROGRESS NOTES
Cardiology Progress Note      1/10/2017 1:29 PM    Admit Date: 1/3/2017    Admit Diagnosis: fall  SHOULDER FRACTURE  Frequent falls      Subjective:     Miah Pereira denies chest pain, chest pressure/discomfort.     Visit Vitals    /81    Pulse 97    Temp 97.8 °F (36.6 °C)    Resp 23    Ht 5' 3\" (1.6 m)    Wt 139.3 kg (307 lb 1.6 oz)    SpO2 90%    BMI 54.4 kg/m2     Current Facility-Administered Medications   Medication Dose Route Frequency    sodium chloride (NS) flush 5-10 mL  5-10 mL IntraVENous Q8H    sodium chloride (NS) flush 5-10 mL  5-10 mL IntraVENous PRN    naloxone (NARCAN) injection 0.1 mg  0.1 mg IntraVENous PRN    diphenhydrAMINE (BENADRYL) injection 12.5 mg  12.5 mg IntraVENous Q6H PRN    dextrose 5% lactated ringers infusion  50 mL/hr IntraVENous CONTINUOUS    sodium chloride (NS) flush 5-10 mL  5-10 mL IntraVENous Q8H    sodium chloride (NS) flush 5-10 mL  5-10 mL IntraVENous PRN    [START ON 1/11/2017] aspirin delayed-release tablet 325 mg  325 mg Oral DAILY AFTER BREAKFAST    acetaminophen (TYLENOL) tablet 650 mg  650 mg Oral Q4H PRN    diphenhydrAMINE (BENADRYL) capsule 25 mg  25 mg Oral Q4H PRN    clindamycin phosphate 900 mg in 0.9% sodium chloride (MBP/ADV) 100 mL ADV  900 mg IntraVENous Q6H    predniSONE (DELTASONE) tablet 20 mg  20 mg Oral 12-DAY PREDNISONE DOSEPACK    polyethylene glycol (MIRALAX) packet 17 g  17 g Oral DAILY    sertraline (ZOLOFT) tablet 100 mg  100 mg Oral DAILY    busPIRone (BUSPAR) tablet 10 mg  10 mg Oral BID    pantoprazole (PROTONIX) tablet 40 mg  40 mg Oral DAILY    lactated ringers infusion 1,000 mL  1,000 mL IntraVENous CONTINUOUS    HYDROmorphone (PF) (DILAUDID) 30 mg / 30 mL PCA   IntraVENous TITRATE    guaiFENesin SR (MUCINEX) tablet 600 mg  600 mg Oral Q12H    docusate sodium (COLACE) capsule 100 mg  100 mg Oral DAILY    nystatin (MYCOSTATIN) 100,000 unit/gram powder   Topical BID    furosemide (LASIX) tablet 40 mg  40 mg Oral DAILY    rOPINIRole (REQUIP) tablet 2 mg  2 mg Oral QHS    insulin lispro (HUMALOG) injection   SubCUTAneous AC&HS    glucose chewable tablet 16 g  4 Tab Oral PRN    glucagon (GLUCAGEN) injection 1 mg  1 mg IntraMUSCular PRN    dextrose (D50W) injection syrg 12.5-25 g  25-50 mL IntraVENous PRN    gabapentin (NEURONTIN) capsule 300 mg  300 mg Oral QPM    gabapentin (NEURONTIN) capsule 600 mg  600 mg Oral QHS    rOPINIRole (REQUIP) tablet 1 mg  1 mg Oral QPM    traZODone (DESYREL) tablet 100 mg  100 mg Oral QHS PRN    potassium chloride (K-DUR, KLOR-CON) SR tablet 20 mEq  20 mEq Oral DAILY    diph,Pertuss(AC),Tet Vac-PF (BOOSTRIX) suspension 0.5 mL  0.5 mL IntraMUSCular PRIOR TO DISCHARGE    HYDROcodone-acetaminophen (NORCO) 7.5-325 mg per tablet 1 Tab  1 Tab Oral Q6H PRN    fluticasone-salmeterol (ADVAIR) 250mcg-50mcg/puff  1 Puff Inhalation BID    fluticasone (FLONASE) 50 mcg/actuation nasal spray 2 Spray  2 Spray Both Nostrils DAILY    tolterodine (DETROL) tablet 2 mg  2 mg Oral BID    loratadine (CLARITIN) tablet 10 mg  10 mg Oral DAILY    albuterol-ipratropium (DUO-NEB) 2.5 MG-0.5 MG/3 ML  3 mL Nebulization Q4H PRN    levoFLOXacin (LEVAQUIN) 500 mg in D5W IVPB  500 mg IntraVENous Q24H         Objective:      Physical Exam:  Visit Vitals    /81    Pulse 97    Temp 97.8 °F (36.6 °C)    Resp 23    Ht 5' 3\" (1.6 m)    Wt 139.3 kg (307 lb 1.6 oz)    SpO2 90%    BMI 54.4 kg/m2     General Appearance:  Well developed, well nourished,alert and oriented x 3, and individual in no acute distress. Ears/Nose/Mouth/Throat:   Hearing grossly normal.         Neck: Supple. Chest:   Lungs clear to auscultation bilaterally. Cardiovascular:  Regular rate and rhythm, S1, S2 normal, no murmur. Abdomen:   Soft, non-tender, bowel sounds are active. Extremities: No edema bilaterally. Skin: Warm and dry.                Data Review:   Labs:    Recent Results (from the past 24 hour(s)) GLUCOSE, POC    Collection Time: 01/09/17  5:16 PM   Result Value Ref Range    Glucose (POC) 169 (H) 70 - 110 mg/dL   GLUCOSE, POC    Collection Time: 01/09/17 10:06 PM   Result Value Ref Range    Glucose (POC) 136 (H) 70 - 110 mg/dL   HEMOGLOBIN A1C WITH EAG    Collection Time: 01/10/17  5:30 AM   Result Value Ref Range    Hemoglobin A1c 5.9 (H) 4.5 - 5.6 %    Est. average glucose 138 mg/dL   METABOLIC PANEL, COMPREHENSIVE    Collection Time: 01/10/17  5:30 AM   Result Value Ref Range    Sodium 139 136 - 145 mmol/L    Potassium 5.2 3.5 - 5.5 mmol/L    Chloride 102 100 - 108 mmol/L    CO2 33 (H) 21 - 32 mmol/L    Anion gap 4 3.0 - 18 mmol/L    Glucose 140 (H) 74 - 99 mg/dL    BUN 14 7.0 - 18 MG/DL    Creatinine 0.76 0.6 - 1.3 MG/DL    BUN/Creatinine ratio 18 12 - 20      GFR est AA >60 >60 ml/min/1.73m2    GFR est non-AA >60 >60 ml/min/1.73m2    Calcium 8.2 (L) 8.5 - 10.1 MG/DL    Bilirubin, total 0.7 0.2 - 1.0 MG/DL     (H) 13 - 56 U/L     (H) 15 - 37 U/L    Alk. phosphatase 291 (H) 45 - 117 U/L    Protein, total 6.4 6.4 - 8.2 g/dL    Albumin 2.0 (L) 3.4 - 5.0 g/dL    Globulin 4.4 (H) 2.0 - 4.0 g/dL    A-G Ratio 0.5 (L) 0.8 - 1.7     CBC WITH AUTOMATED DIFF    Collection Time: 01/10/17  5:30 AM   Result Value Ref Range    WBC 8.8 4.6 - 13.2 K/uL    RBC 4.08 (L) 4.20 - 5.30 M/uL    HGB 11.6 (L) 12.0 - 16.0 g/dL    HCT 37.4 35.0 - 45.0 %    MCV 91.7 74.0 - 97.0 FL    MCH 28.4 24.0 - 34.0 PG    MCHC 31.0 31.0 - 37.0 g/dL    RDW 14.5 11.6 - 14.5 %    PLATELET 703 389 - 276 K/uL    MPV 11.1 9.2 - 11.8 FL    NEUTROPHILS 78 (H) 40 - 73 %    LYMPHOCYTES 12 (L) 21 - 52 %    MONOCYTES 10 3 - 10 %    EOSINOPHILS 0 0 - 5 %    BASOPHILS 0 0 - 2 %    ABS. NEUTROPHILS 6.9 1.8 - 8.0 K/UL    ABS. LYMPHOCYTES 1.1 0.9 - 3.6 K/UL    ABS. MONOCYTES 0.9 0.05 - 1.2 K/UL    ABS. EOSINOPHILS 0.0 0.0 - 0.4 K/UL    ABS.  BASOPHILS 0.0 0.0 - 0.06 K/UL    DF AUTOMATED     GLUCOSE, POC    Collection Time: 01/10/17  6:59 AM Result Value Ref Range    Glucose (POC) 127 (H) 70 - 110 mg/dL   GLUCOSE, POC    Collection Time: 01/10/17 11:15 AM   Result Value Ref Range    Glucose (POC) 198 (H) 70 - 110 mg/dL       Telemetry: normal sinus rhythm      Assessment:     Active Problems: Morbid obesity with BMI of 50.0-59.9, adult ([facility] [address] (9/21/2016)      Frequent falls (1/4/2017)      UTI (urinary tract infection) (1/4/2017)      Elevated LFTs (1/4/2017)      Fracture of left humerus (1/4/2017)      Hypoxia (1/4/2017)      Immobility (1/4/2017)        Plan:     The patient is hemodynamically stable. She tolerated surgery from the cardiac standpoint quite well. Continue to follow.     Andrea George MD

## 2017-01-10 NOTE — PROGRESS NOTES
conducted a Follow up consultation and Spiritual Assessment for Priscilla Alvarez, who is a 61 y.o.,female. Discussed Advance Medical Directive with patient and her need to complete. Reviewed them with her and left a copy for her. She has a sister and her son (pt's nephew) who she would like to chose. We will complete the form tomorrow. The  provided the following Interventions:  Continued the relationship of care and support. Listened empathically. Offered prayer and assurance of continued prayer on patients behalf. Chart reviewed. The following outcomes were achieved:  Patient expressed gratitude for Spiritual Care visit. Patient was reviewed in ICU Interdisciplinary Rounds. Assessment:  There are no further spiritual or Buddhism issues which require Spiritual Care Services intervention at this time. Plan:  Chaplains will continue to follow and will provide pastoral care as needed or requested.  recommends bedside caregivers page the  on duty if patient shows signs of acute spiritual or emotional distress. [id number] [id number].    Board Certified   [phone] - Office

## 2017-01-10 NOTE — PROGRESS NOTES
Hospitalist Progress Note    Patient: Kash Jacome MRN: [id number]  CSN: [id number]    YOB: 1950  Age: 61 y.o.   Sex: female    DOA: 1/3/2017 LOS:  LOS: 6 days          Chief Complaint:    Shoulder replacement, fall, humerus fx        Assessment/Plan     Humerus fracture-s/p shoulder replacement surgery-on 12 liters 02 this am but alert and feeling well controlled from a pain perspective  No acute issues post-op  In ICU due to high risk resp status and obesity  PCA pump for pain  OOB to chair this am, start diet back  Wean 02  Can transfer back to floor if doing well  Wean PCA shortly    Atelectasis, bronchitis-IS use, on levaquin, NP cough, no fevers-repeat CXR reviewed  UTI-e coli-on levaquin-finishes 7 days abx 1/11  Morbid obesity  Fatty liver-LFT are improved-no acute issue  Immobility-needs PT assessment-will need acute rehab on d/c  Depression-zoloft and buspar as she is on at home  GERD- prilosec       Patient Active Problem List   Diagnosis Code    S/P gastric bypass Z98.84    NAFLD (nonalcoholic fatty liver disease) K76.0    Morbid obesity with BMI of 50.0-59.9, adult ([facility] [address] E66.01, Z68.43    Frequent falls R29.6    UTI (urinary tract infection) N39.0    Elevated LFTs R79.89    Fracture of left humerus S42.302A    Hypoxia R09.02    Immobility Z74.09       Subjective:    Denies pain in shoulder  No SOB  Thirsty  Denies cough, CP, nausea    Review of systems:    Constitutional: denies fevers, chills, myalgias  Respiratory: denies SOB, cough  Cardiovascular: denies chest pain, palpitations  Gastrointestinal: denies nausea, vomiting, diarrhea      Vital signs/Intake and Output:  Visit Vitals    BP 96/60    Pulse 90    Temp 98.1 °F (36.7 °C)    Resp 14    Ht 5' 3\" (1.6 m)    Wt 139.3 kg (307 lb 1.6 oz)    SpO2 93%    BMI 54.4 kg/m2     Current Shift:     Last three shifts:  01/08 1901 - 01/10 0700  In: 3647.9 [I.V.:3647.9]  Out: 1925 [Urine:1925]    Exam:    General: obese WF alert, NAD, OX3  Head/Neck: NCAT, supple, No masses, No lymphadenopathy  CVS:Regular rate and rhythm, no M/R/G, S1/S2 heard, no thrill  Lungs:Clear to auscultation bilaterally, no wheezes, rhonchi, or rales  Abdomen: Soft, Nontender, No distention, Normal Bowel sounds, No hepatomegaly  Extremities:edema 1 plus, left shoulder dressed and UE in sling  Skin:normal texture and turgor, no rashes, no lesions  Neuro:grossly normal , follows commands  Psych:appropriate                Labs: Results:       Chemistry Recent Labs      01/10/17   0530  01/09/17   0804   GLU  140*  109*   NA  139  139   K  5.2  4.5   CL  102  101   CO2  33*  32   BUN  14  17   CREA  0.76  0.77   CA  8.2*  8.8   AGAP  4  6   BUCR  18  22*   AP  291*  167*   TP  6.4  6.7   ALB  2.0*  2.1*   GLOB  4.4*  4.6*   AGRAT  0.5*  0.5*      CBC w/Diff Recent Labs      01/10/17   0530  01/09/17   0804   WBC  8.8  8.1   RBC  4.08*  4.48   HGB  11.6*  12.7   HCT  37.4  40.5   PLT  253  245   GRANS  78*  60   LYMPH  12*  26   EOS  0  1      Cardiac Enzymes No results for input(s): CPK, CKND1, YESSENIA in the last 72 hours. No lab exists for component: CKRMB, TROIP   Coagulation No results for input(s): PTP, INR, APTT in the last 72 hours. No lab exists for component: INREXT    Lipid Panel No results found for: CHOL, CHOLPOCT, CHOLX, CHLST, CHOLV, [id number], HDL, LDL, NLDLCT, DLDL, LDLC, DLDLP, [id number], VLDLC, VLDL, TGL, TGLX, TRIGL, [id number], TRIGP, TGLPOCT, [id number], CHHD, CHHDX   BNP No results for input(s): BNPP in the last 72 hours.    Liver Enzymes Recent Labs      01/10/17   0530   TP  6.4   ALB  2.0*   AP  291*   SGOT  315*      Thyroid Studies No results found for: T4, T3U, TSH, TSHEXT     Procedures/imaging: see electronic medical records for all procedures/Xrays and details which were not copied into this note but were reviewed prior to creation of Jovon Link MD

## 2017-01-10 NOTE — PROGRESS NOTES
Bedside and Verbal shift change report given to ADELINE Nova RN  Report included the following information SBAR, Kardex, Intake/Output, MAR and Recent Results. Alarm parameters reviewed and opportunities for questions provided.

## 2017-01-10 NOTE — PROGRESS NOTES
Patient: Kash Jacome               Sex: female          DOA: 1/3/2017         YOB: 1950      Age:  61 y.o.        LOS:  LOS: 6 days               Subjective:     Recovering from the left reverse total shoulder arthroplasty for fracture. Wound OK, NV intact. X-rays show good alignment. Less pain now than preop    Active Problems: Morbid obesity with BMI of 50.0-59.9, adult ([facility] [address] (9/21/2016)      Frequent falls (1/4/2017)      UTI (urinary tract infection) (1/4/2017)      Elevated LFTs (1/4/2017)      Fracture of left humerus (1/4/2017)      Hypoxia (1/4/2017)      Immobility (1/4/2017)        Stable from ortho point of view.  Rehab placement appropriate when medically stable

## 2017-01-10 NOTE — PERIOP NOTES
TRANSFER - OUT REPORT:    Verbal report given to SHI Munoz RN (name) on Sabino Six  being transferred to ICU (unit) for routine progression of care       Report consisted of patients Situation, Background, Assessment and   Recommendations(SBAR). Information from the following report(s) SBAR, OR Summary, Procedure Summary, Intake/Output and MAR was reviewed with the receiving nurse. Lines:   Triple Lumen 01/09/17 Right Internal jugular (Active)   Central Line Being Utilized Yes 1/9/2017  6:00 PM   Criteria for Appropriate Use Hemodynamically unstable, requiring monitoring lines, vasopressors, or volume resuscitation 1/9/2017  6:00 PM   Site Assessment Clean, dry, & intact 1/9/2017  6:00 PM   Infiltration Assessment 0 1/9/2017  6:00 PM   Affected Extremity/Extremities Color distal to insertion site pink (or appropriate for race) 1/9/2017  6:00 PM   Date of Last Dressing Change 01/09/17 1/9/2017  6:00 PM   Dressing Status Clean, dry, & intact 1/9/2017  6:00 PM   Dressing Type Transparent;Tape 1/9/2017  6:00 PM   Proximal Hub Color/Line Status White;Capped 1/9/2017  6:00 PM   Medial Hub Color/Line Status Blue;Capped 1/9/2017  6:00 PM   Distal Hub Color/Line Status Brown; Infusing 1/9/2017  6:00 PM   Alcohol Cap Used Yes 1/9/2017  6:00 PM       Peripheral IV 01/09/17 Right;Upper Arm (Active)   Site Assessment Clean, dry, & intact 1/9/2017  6:00 PM   Phlebitis Assessment 0 1/9/2017  6:00 PM   Infiltration Assessment 0 1/9/2017  6:00 PM   Dressing Status Clean, dry, & intact 1/9/2017  6:00 PM   Dressing Type Tape;Transparent 1/9/2017  6:00 PM   Hub Color/Line Status Capped 1/9/2017  6:00 PM   Alcohol Cap Used Yes 1/9/2017  6:00 PM        Opportunity for questions and clarification was provided.       Patient transported with:   Monitor  O2 @ 3 liters  Registered Nurse  [facility]

## 2017-01-10 NOTE — PROGRESS NOTES
Met with pt at bedside, pt has transfer orders for medical unit. Pt currently on NC oxygen. Pt states she feels, \"fine\", now that she is on nasal cannula. [address] is able to assist pt with post discharge rehab, will need insurance approval to transition to facility. Pt will need to be off bipap to go to [facility], as they are unable to accommodate NIV at facility. Awaiting PT/OT post surgery to assist with obtaining insurance approval.  CM continues to follow. Care Management Interventions  PCP Verified by CM:  Yes  Transition of Care Consult (CM Consult): Discharge Planning, SNF  Discharge Durable Medical Equipment: No  Physical Therapy Consult: Yes  Occupational Therapy Consult: Yes  Current Support Network: Own Home, Lives Alone  Confirm Follow Up Transport: [address] discussed with Pt/Family/Caregiver: Yes  Freedom of Choice Offered: Yes  Discharge Location  Discharge Placement: Skilled nursing facility

## 2017-01-10 NOTE — INTERDISCIPLINARY ROUNDS
CRITICAL CARE INTERDISCIPLINARY ROUNDS    Patient Information:    Name:   Sabino Gill    Age:   61 y.o. Admission Date:   1/3/2017    Critical Care Day: 2    Surgery Date:  1-10-17    Attending Provider:   Ana Maria Jimenez MD    Surgeon:        Consultant(s):   [address] Physician:  Tevin    Code Status: Full Code    Problem List:     Patient Active Problem List   Diagnosis Code    S/P gastric bypass Z98.84    NAFLD (nonalcoholic fatty liver disease) K76.0    Morbid obesity with BMI of 50.0-59.9, adult ([facility] [address] E66.01, Z68.43    Frequent falls R29.6    UTI (urinary tract infection) N39.0    Elevated LFTs R79.89    Fracture of left humerus S42.302A    Hypoxia R09.02    Immobility Z74.09       Principal Problem:  <principal problem not specified>    During rounds the following quality care indicators and evidence based practices were addressed :  DVT Prophylaxis and PUD Prophylaxis Dewitt Day  (M-Care 6) ; Central Line Day:na Isolation:na; Antibiotic Stewardship: na; Probiotics Necessary: na    Ventilator Bundle:      Sepsis Order Set: na or Elements of Sepsis Bundle Reviewed (Fluids, Antibiotics, Lactic Acid, Cultures, Vasopressors, Steriods, Glycemic control, Peak Plateau)    Glycemic Control:   Recent Labs      01/10/17   0530  01/09/17   0804   GLU  140*  109*   ; No results for input(s): PHI, PCO2I, PO2I in the last 72 hours. No lab exists for component: 3 Adjustments     Acute MI/PCI:   Not applicable    Door to Balloon: Admission Time: 1724 Procedure:  (reverse  total shoulder ) (01/09/17 1100)    Heart Failure:    Not applicable     SCIP Measures for other Surgeries:   Controlled post-op glucose and VTE Prophylaxis CHG Bath Daily on All Ortho yes    Pneumonia:    Not applicable    Interdisciplinary team rounds were held with the following team membersCare Management, Nursing, Nutrition, Pastoral Care, Pharmacy, Physician and Respiratory Therapy. Plan of care discussed.      Goals of Care/ Recommendations: improved respiratory status, possible transfer    See clinical pathway and/or care plan for interventions and desired outcomes.     Critical Care Discharge Status:      Yellow

## 2017-01-11 LAB
ALBUMIN SERPL BCP-MCNC: 1.9 G/DL (ref 3.4–5)
ALBUMIN/GLOB SERPL: 0.5 {RATIO} (ref 0.8–1.7)
ALP SERPL-CCNC: 210 U/L (ref 45–117)
ALT SERPL-CCNC: 149 U/L (ref 13–56)
ANION GAP BLD CALC-SCNC: 4 MMOL/L (ref 3–18)
AST SERPL W P-5'-P-CCNC: 123 U/L (ref 15–37)
BASOPHILS # BLD AUTO: 0 K/UL (ref 0–0.06)
BASOPHILS # BLD: 0 % (ref 0–2)
BILIRUB SERPL-MCNC: 0.5 MG/DL (ref 0.2–1)
BUN SERPL-MCNC: 15 MG/DL (ref 7–18)
BUN/CREAT SERPL: 24 (ref 12–20)
CALCIUM SERPL-MCNC: 8 MG/DL (ref 8.5–10.1)
CHLORIDE SERPL-SCNC: 102 MMOL/L (ref 100–108)
CO2 SERPL-SCNC: 32 MMOL/L (ref 21–32)
CREAT SERPL-MCNC: 0.62 MG/DL (ref 0.6–1.3)
DIFFERENTIAL METHOD BLD: ABNORMAL
EOSINOPHIL # BLD: 0 K/UL (ref 0–0.4)
EOSINOPHIL NFR BLD: 0 % (ref 0–5)
ERYTHROCYTE [DISTWIDTH] IN BLOOD BY AUTOMATED COUNT: 14.2 % (ref 11.6–14.5)
GLOBULIN SER CALC-MCNC: 4.1 G/DL (ref 2–4)
GLUCOSE BLD STRIP.AUTO-MCNC: 113 MG/DL (ref 70–110)
GLUCOSE BLD STRIP.AUTO-MCNC: 118 MG/DL (ref 70–110)
GLUCOSE BLD STRIP.AUTO-MCNC: 125 MG/DL (ref 70–110)
GLUCOSE BLD STRIP.AUTO-MCNC: 126 MG/DL (ref 70–110)
GLUCOSE SERPL-MCNC: 120 MG/DL (ref 74–99)
HCT VFR BLD AUTO: 33.5 % (ref 35–45)
HGB BLD-MCNC: 10.3 G/DL (ref 12–16)
LYMPHOCYTES # BLD AUTO: 13 % (ref 21–52)
LYMPHOCYTES # BLD: 0.9 K/UL (ref 0.9–3.6)
MCH RBC QN AUTO: 28.4 PG (ref 24–34)
MCHC RBC AUTO-ENTMCNC: 30.7 G/DL (ref 31–37)
MCV RBC AUTO: 92.3 FL (ref 74–97)
MONOCYTES # BLD: 0.8 K/UL (ref 0.05–1.2)
MONOCYTES NFR BLD AUTO: 11 % (ref 3–10)
NEUTS SEG # BLD: 5.4 K/UL (ref 1.8–8)
NEUTS SEG NFR BLD AUTO: 76 % (ref 40–73)
PLATELET # BLD AUTO: 271 K/UL (ref 135–420)
PMV BLD AUTO: 11 FL (ref 9.2–11.8)
POTASSIUM SERPL-SCNC: 4.5 MMOL/L (ref 3.5–5.5)
PROT SERPL-MCNC: 6 G/DL (ref 6.4–8.2)
RBC # BLD AUTO: 3.63 M/UL (ref 4.2–5.3)
RBC MORPH BLD: ABNORMAL
SODIUM SERPL-SCNC: 138 MMOL/L (ref 136–145)
WBC # BLD AUTO: 7.1 K/UL (ref 4.6–13.2)

## 2017-01-11 PROCEDURE — 74011250637 HC RX REV CODE- 250/637: Performed by: HOSPITALIST

## 2017-01-11 PROCEDURE — 74011250636 HC RX REV CODE- 250/636: Performed by: FAMILY MEDICINE

## 2017-01-11 PROCEDURE — 74011250636 HC RX REV CODE- 250/636: Performed by: HOSPITALIST

## 2017-01-11 PROCEDURE — 74011250637 HC RX REV CODE- 250/637: Performed by: FAMILY MEDICINE

## 2017-01-11 PROCEDURE — 36592 COLLECT BLOOD FROM PICC: CPT

## 2017-01-11 PROCEDURE — 85025 COMPLETE CBC W/AUTO DIFF WBC: CPT | Performed by: INTERNAL MEDICINE

## 2017-01-11 PROCEDURE — 74011000258 HC RX REV CODE- 258: Performed by: ORTHOPAEDIC SURGERY

## 2017-01-11 PROCEDURE — 80053 COMPREHEN METABOLIC PANEL: CPT | Performed by: INTERNAL MEDICINE

## 2017-01-11 PROCEDURE — 82962 GLUCOSE BLOOD TEST: CPT

## 2017-01-11 PROCEDURE — 74011250637 HC RX REV CODE- 250/637: Performed by: INTERNAL MEDICINE

## 2017-01-11 PROCEDURE — 74011636637 HC RX REV CODE- 636/637: Performed by: INTERNAL MEDICINE

## 2017-01-11 PROCEDURE — 97530 THERAPEUTIC ACTIVITIES: CPT

## 2017-01-11 PROCEDURE — 65270000029 HC RM PRIVATE

## 2017-01-11 PROCEDURE — 97116 GAIT TRAINING THERAPY: CPT

## 2017-01-11 PROCEDURE — 74011250636 HC RX REV CODE- 250/636: Performed by: SPECIALIST

## 2017-01-11 PROCEDURE — 74011000250 HC RX REV CODE- 250: Performed by: ORTHOPAEDIC SURGERY

## 2017-01-11 RX ORDER — SODIUM CHLORIDE 450 MG/100ML
75 INJECTION, SOLUTION INTRAVENOUS CONTINUOUS
Status: DISCONTINUED | OUTPATIENT
Start: 2017-01-11 | End: 2017-01-11

## 2017-01-11 RX ORDER — ENOXAPARIN SODIUM 100 MG/ML
60 INJECTION SUBCUTANEOUS EVERY 24 HOURS
Status: DISCONTINUED | OUTPATIENT
Start: 2017-01-11 | End: 2017-01-13 | Stop reason: HOSPADM

## 2017-01-11 RX ORDER — DOCUSATE SODIUM 100 MG/1
100 CAPSULE, LIQUID FILLED ORAL 2 TIMES DAILY
Status: DISCONTINUED | OUTPATIENT
Start: 2017-01-11 | End: 2017-01-13 | Stop reason: HOSPADM

## 2017-01-11 RX ADMIN — PREDNISONE 10 MG: 10 TABLET ORAL at 01:18

## 2017-01-11 RX ADMIN — FLUTICASONE PROPIONATE AND SALMETEROL 1 PUFF: 50; 250 POWDER RESPIRATORY (INHALATION) at 12:14

## 2017-01-11 RX ADMIN — PREDNISONE 20 MG: 20 TABLET ORAL at 21:46

## 2017-01-11 RX ADMIN — GABAPENTIN 600 MG: 300 CAPSULE ORAL at 21:46

## 2017-01-11 RX ADMIN — Medication 10 ML: at 21:45

## 2017-01-11 RX ADMIN — GUAIFENESIN 600 MG: 600 TABLET, EXTENDED RELEASE ORAL at 12:08

## 2017-01-11 RX ADMIN — ENOXAPARIN SODIUM 60 MG: 60 INJECTION SUBCUTANEOUS at 18:07

## 2017-01-11 RX ADMIN — PREDNISONE 10 MG: 10 TABLET ORAL at 18:06

## 2017-01-11 RX ADMIN — POTASSIUM CHLORIDE 20 MEQ: 20 TABLET, EXTENDED RELEASE ORAL at 12:08

## 2017-01-11 RX ADMIN — POLYETHYLENE GLYCOL 3350 17 G: 17 POWDER, FOR SOLUTION ORAL at 12:08

## 2017-01-11 RX ADMIN — DOCUSATE SODIUM 100 MG: 100 CAPSULE, LIQUID FILLED ORAL at 21:45

## 2017-01-11 RX ADMIN — PREDNISONE 20 MG: 20 TABLET ORAL at 01:19

## 2017-01-11 RX ADMIN — BUSPIRONE HYDROCHLORIDE 10 MG: 5 TABLET ORAL at 21:45

## 2017-01-11 RX ADMIN — SERTRALINE HYDROCHLORIDE 100 MG: 50 TABLET ORAL at 12:08

## 2017-01-11 RX ADMIN — PANTOPRAZOLE SODIUM 40 MG: 40 TABLET, DELAYED RELEASE ORAL at 12:08

## 2017-01-11 RX ADMIN — CLINDAMYCIN PHOSPHATE 900 MG: 150 INJECTION, SOLUTION, CONCENTRATE INTRAVENOUS at 06:42

## 2017-01-11 RX ADMIN — FUROSEMIDE 40 MG: 40 TABLET ORAL at 12:08

## 2017-01-11 RX ADMIN — Medication 10 ML: at 14:00

## 2017-01-11 RX ADMIN — ROPINIROLE 1 MG: 1 TABLET, FILM COATED ORAL at 18:08

## 2017-01-11 RX ADMIN — LEVOFLOXACIN 500 MG: 5 INJECTION, SOLUTION INTRAVENOUS at 01:00

## 2017-01-11 RX ADMIN — HYDROCODONE BITARTRATE AND ACETAMINOPHEN 1 TABLET: 7.5; 325 TABLET ORAL at 15:34

## 2017-01-11 RX ADMIN — LORATADINE 10 MG: 10 TABLET ORAL at 12:08

## 2017-01-11 RX ADMIN — Medication 10 ML: at 05:19

## 2017-01-11 RX ADMIN — GUAIFENESIN 600 MG: 600 TABLET, EXTENDED RELEASE ORAL at 21:45

## 2017-01-11 RX ADMIN — PREDNISONE 20 MG: 20 TABLET ORAL at 06:43

## 2017-01-11 RX ADMIN — TOLTERODINE TARTRATE 2 MG: 2 TABLET, FILM COATED ORAL at 12:09

## 2017-01-11 RX ADMIN — BUSPIRONE HYDROCHLORIDE 10 MG: 5 TABLET ORAL at 12:08

## 2017-01-11 RX ADMIN — FLUTICASONE PROPIONATE 2 SPRAY: 50 SPRAY, METERED NASAL at 12:14

## 2017-01-11 RX ADMIN — SODIUM CHLORIDE, SODIUM LACTATE, POTASSIUM CHLORIDE, AND CALCIUM CHLORIDE 1000 ML: 600; 310; 30; 20 INJECTION, SOLUTION INTRAVENOUS at 05:18

## 2017-01-11 RX ADMIN — GABAPENTIN 300 MG: 300 CAPSULE ORAL at 18:06

## 2017-01-11 RX ADMIN — CLINDAMYCIN PHOSPHATE 900 MG: 150 INJECTION, SOLUTION, CONCENTRATE INTRAVENOUS at 01:00

## 2017-01-11 RX ADMIN — TOLTERODINE TARTRATE 2 MG: 2 TABLET, FILM COATED ORAL at 21:45

## 2017-01-11 RX ADMIN — ROPINIROLE 2 MG: 1 TABLET, FILM COATED ORAL at 01:01

## 2017-01-11 RX ADMIN — HYDROCODONE BITARTRATE AND ACETAMINOPHEN 1 TABLET: 7.5; 325 TABLET ORAL at 01:33

## 2017-01-11 RX ADMIN — PREDNISONE 10 MG: 10 TABLET ORAL at 01:19

## 2017-01-11 RX ADMIN — HYDROCODONE BITARTRATE AND ACETAMINOPHEN 1 TABLET: 7.5; 325 TABLET ORAL at 08:59

## 2017-01-11 RX ADMIN — PREDNISONE 10 MG: 10 TABLET ORAL at 18:07

## 2017-01-11 RX ADMIN — HYDROCODONE BITARTRATE AND ACETAMINOPHEN 1 TABLET: 7.5; 325 TABLET ORAL at 21:57

## 2017-01-11 RX ADMIN — FLUTICASONE PROPIONATE AND SALMETEROL 1 PUFF: 50; 250 POWDER RESPIRATORY (INHALATION) at 21:46

## 2017-01-11 NOTE — PROGRESS NOTES
Problem: Mobility Impaired (Adult and Pediatric)  Goal: *Acute Goals and Plan of Care (Insert Text)  Physical Therapy Goals  Initiated 1/5/2017 to be met within 2 days  STGs:  1. Rolling toward right with min/mod assist; Supine to/from supine with min assist in prep for ADL activity. 2. Tolerated sitting EOB 10+min for ADL activity. Physical Therapy Goals  Initiated 1/10/2017 and to be accomplished within 7 day(s)  Supine to sit to sit CGA/S with HR for positioning. Transfers: Sit to stand to chair CGA/S with LRAD for ADLs. Gait: Ambulate >100ft CGA/S with LRAD, no LOB, for home/community mobility. Stair Negotiation: Ascend/descend 6-inch box step min A/CGA with HR/HHA x 3 completions for home entry. Patient/Family Education: Independent with HEP and demonstrate competency with don/doff sling for follow-up care and safe discharge. Outcome: Progressing Towards Goal  PHYSICAL THERAPY TREATMENT     Patient: Brent Yoder (56 y.o. female)  Date: 1/11/2017  Diagnosis: fall  SHOULDER FRACTURE  Frequent falls <principal problem not specified>  Procedure(s) (LRB):  REVERSE LEFT TOTAL SHOULDER ARTHROPLASTY (Left) 2 Days Post-Op  Precautions: Fall   Chart, physical therapy assessment, plan of care and goals were reviewed. ASSESSMENT:  Pt requires 4 attempts to stand from chair, but able to safely transition back to bed.will continue progression tomorrow. Progression toward goals:  [ ]      Improving appropriately and progressing toward goals  [X]      Improving slowly and progressing toward goals  [ ]      Not making progress toward goals and plan of care will be adjusted       PLAN:  Patient continues to benefit from skilled intervention to address the above impairments. Continue treatment per established plan of care.   Discharge Recommendations:  Bryan Marmolejo  Further Equipment Recommendations for Discharge:  bedside commode and rolling walker       SUBJECTIVE:   Patient stated I am falling to sleep. I need to get back in the bed.       OBJECTIVE DATA SUMMARY:   Critical Behavior:  Neurologic State: Alert, Appropriate for age  Orientation Level: Oriented X4  Cognition: Appropriate decision making  Safety/Judgement: Fall prevention  Functional Mobility Training:  Bed Mobility:  Rolling: Minimum assistance  Supine to Sit: Minimum assistance              Sit to Supine: Minimum assistance  Transfers:  Sit to Stand: Minimum assistance  Stand to Sit: Minimum assistance  Balance:  Sitting: Intact  Sitting - Static: Good (unsupported)  Sitting - Dynamic: Fair (occasional)  Standing: Impaired  Standing - Static: Fair  Standing - Dynamic : Fair  Ambulation/Gait Training:  Distance (ft): 3 Feet (ft)  Assistive Device: Other (comment);Gait belt  Ambulation - Level of Assistance: Minimal assistance  Gait Abnormalities: Decreased step clearance;Shuffling gait  Base of Support: Widened  Stance: Weight shift  Speed/Gemini: Slow;Shuffled  Step Length: Right shortened;Left shortened  Swing Pattern: Right asymmetrical;Left asymmetrical  Interventions: Visual/Demos; Verbal cues; Tactile cues; Safety awareness training  Pain:  Pain Scale 1: Numeric (0 - 10)  Pain Intensity 1: 10  Pain Location 1: Shoulder  Pain Orientation 1: Lower  Pain Description 1: Sharp  Pain Intervention(s) 1: Medication (see MAR)  Activity Tolerance:   Good  Please refer to the flowsheet for vital signs taken during this treatment.   After treatment:   [ ] Patient left in no apparent distress sitting up in chair  [X] Patient left in no apparent distress in bed  [X] Call bell left within reach  [X] Nursing notified  [ ] Caregiver present  [ ] Bed alarm activated      Henrik Barton PTA   Time Calculation: 30 mins

## 2017-01-11 NOTE — PROGRESS NOTES
Cardiology Progress Note      1/11/2017 10:59 AM    Admit Date: 1/3/2017    Admit Diagnosis: fall  SHOULDER FRACTURE  Frequent falls      Subjective:     Josh Blunt denies chest pain, chest pressure/discomfort, dyspnea.     Visit Vitals    /61    Pulse 87    Temp 97.9 °F (36.6 °C)    Resp 18    Ht 5' 3\" (1.6 m)    Wt 146.1 kg (322 lb 1.6 oz)    SpO2 91%    BMI 57.06 kg/m2     Current Facility-Administered Medications   Medication Dose Route Frequency    docusate sodium (COLACE) capsule 100 mg  100 mg Oral BID    sodium chloride (NS) flush 5-10 mL  5-10 mL IntraVENous Q8H    sodium chloride (NS) flush 5-10 mL  5-10 mL IntraVENous PRN    naloxone (NARCAN) injection 0.1 mg  0.1 mg IntraVENous PRN    diphenhydrAMINE (BENADRYL) injection 12.5 mg  12.5 mg IntraVENous Q6H PRN    sodium chloride (NS) flush 5-10 mL  5-10 mL IntraVENous Q8H    sodium chloride (NS) flush 5-10 mL  5-10 mL IntraVENous PRN    aspirin delayed-release tablet 325 mg  325 mg Oral DAILY AFTER BREAKFAST    acetaminophen (TYLENOL) tablet 650 mg  650 mg Oral Q4H PRN    diphenhydrAMINE (BENADRYL) capsule 25 mg  25 mg Oral Q4H PRN    predniSONE (DELTASONE) tablet 20 mg  20 mg Oral ACB    predniSONE (DELTASONE) tablet 10 mg  10 mg Oral PCL    predniSONE (DELTASONE) tablet 10 mg  10 mg Oral PCD    predniSONE (DELTASONE) tablet 20 mg  20 mg Oral QHS    [START ON 1/14/2017] predniSONE (DELTASONE) tablet 10 mg  10 mg Oral 12-DAY PREDNISONE DOSEPAK    [START ON 1/18/2017] predniSONE (DELTASONE) tablet 10 mg  10 mg Oral 12-DAY PREDNISONE DOSEPACK    polyethylene glycol (MIRALAX) packet 17 g  17 g Oral DAILY    sertraline (ZOLOFT) tablet 100 mg  100 mg Oral DAILY    busPIRone (BUSPAR) tablet 10 mg  10 mg Oral BID    pantoprazole (PROTONIX) tablet 40 mg  40 mg Oral DAILY    guaiFENesin SR (MUCINEX) tablet 600 mg  600 mg Oral Q12H    nystatin (MYCOSTATIN) 100,000 unit/gram powder   Topical BID    furosemide (LASIX) tablet 40 mg  40 mg Oral DAILY    rOPINIRole (REQUIP) tablet 2 mg  2 mg Oral QHS    insulin lispro (HUMALOG) injection   SubCUTAneous AC&HS    glucose chewable tablet 16 g  4 Tab Oral PRN    glucagon (GLUCAGEN) injection 1 mg  1 mg IntraMUSCular PRN    dextrose (D50W) injection syrg 12.5-25 g  25-50 mL IntraVENous PRN    gabapentin (NEURONTIN) capsule 300 mg  300 mg Oral QPM    gabapentin (NEURONTIN) capsule 600 mg  600 mg Oral QHS    rOPINIRole (REQUIP) tablet 1 mg  1 mg Oral QPM    traZODone (DESYREL) tablet 100 mg  100 mg Oral QHS PRN    potassium chloride (K-DUR, KLOR-CON) SR tablet 20 mEq  20 mEq Oral DAILY    diph,Pertuss(AC),Tet Vac-PF (BOOSTRIX) suspension 0.5 mL  0.5 mL IntraMUSCular PRIOR TO DISCHARGE    HYDROcodone-acetaminophen (NORCO) 7.5-325 mg per tablet 1 Tab  1 Tab Oral Q6H PRN    fluticasone-salmeterol (ADVAIR) 250mcg-50mcg/puff  1 Puff Inhalation BID    fluticasone (FLONASE) 50 mcg/actuation nasal spray 2 Spray  2 Spray Both Nostrils DAILY    tolterodine (DETROL) tablet 2 mg  2 mg Oral BID    loratadine (CLARITIN) tablet 10 mg  10 mg Oral DAILY    albuterol-ipratropium (DUO-NEB) 2.5 MG-0.5 MG/3 ML  3 mL Nebulization Q4H PRN         Objective:      Physical Exam:  Visit Vitals    /61    Pulse 87    Temp 97.9 °F (36.6 °C)    Resp 18    Ht 5' 3\" (1.6 m)    Wt 146.1 kg (322 lb 1.6 oz)    SpO2 91%    BMI 57.06 kg/m2     General Appearance:  Well developed, well nourished,alert and oriented x 3, and individual in no acute distress. Ears/Nose/Mouth/Throat:   Hearing grossly normal.         Neck: Supple. Chest:   Lungs clear to auscultation bilaterally. Cardiovascular:  Regular rate and rhythm, S1, S2 normal, no murmur. Abdomen:   Soft, non-tender, bowel sounds are active. Extremities: No edema bilaterally. Skin: Warm and dry.                Data Review:   Labs:    Recent Results (from the past 24 hour(s))   GLUCOSE, POC    Collection Time: 01/10/17 11:15 AM   Result Value Ref Range    Glucose (POC) 198 (H) 70 - 110 mg/dL   GLUCOSE, POC    Collection Time: 01/10/17  4:22 PM   Result Value Ref Range    Glucose (POC) 151 (H) 70 - 110 mg/dL   GLUCOSE, POC    Collection Time: 01/10/17  9:00 PM   Result Value Ref Range    Glucose (POC) 160 (H) 70 - 110 mg/dL   CBC WITH AUTOMATED DIFF    Collection Time: 01/11/17  4:40 AM   Result Value Ref Range    WBC 7.1 4.6 - 13.2 K/uL    RBC 3.63 (L) 4.20 - 5.30 M/uL    HGB 10.3 (L) 12.0 - 16.0 g/dL    HCT 33.5 (L) 35.0 - 45.0 %    MCV 92.3 74.0 - 97.0 FL    MCH 28.4 24.0 - 34.0 PG    MCHC 30.7 (L) 31.0 - 37.0 g/dL    RDW 14.2 11.6 - 14.5 %    PLATELET 109 170 - 888 K/uL    MPV 11.0 9.2 - 11.8 FL    NEUTROPHILS 76 (H) 40 - 73 %    LYMPHOCYTES 13 (L) 21 - 52 %    MONOCYTES 11 (H) 3 - 10 %    EOSINOPHILS 0 0 - 5 %    BASOPHILS 0 0 - 2 %    ABS. NEUTROPHILS 5.4 1.8 - 8.0 K/UL    ABS. LYMPHOCYTES 0.9 0.9 - 3.6 K/UL    ABS. MONOCYTES 0.8 0.05 - 1.2 K/UL    ABS. EOSINOPHILS 0.0 0.0 - 0.4 K/UL    ABS. BASOPHILS 0.0 0.0 - 0.06 K/UL    RBC COMMENTS NORMOCYTIC, NORMOCHROMIC      DF AUTOMATED     METABOLIC PANEL, COMPREHENSIVE    Collection Time: 01/11/17  4:40 AM   Result Value Ref Range    Sodium 138 136 - 145 mmol/L    Potassium 4.5 3.5 - 5.5 mmol/L    Chloride 102 100 - 108 mmol/L    CO2 32 21 - 32 mmol/L    Anion gap 4 3.0 - 18 mmol/L    Glucose 120 (H) 74 - 99 mg/dL    BUN 15 7.0 - 18 MG/DL    Creatinine 0.62 0.6 - 1.3 MG/DL    BUN/Creatinine ratio 24 (H) 12 - 20      GFR est AA >60 >60 ml/min/1.73m2    GFR est non-AA >60 >60 ml/min/1.73m2    Calcium 8.0 (L) 8.5 - 10.1 MG/DL    Bilirubin, total 0.5 0.2 - 1.0 MG/DL     (H) 13 - 56 U/L     (H) 15 - 37 U/L    Alk.  phosphatase 210 (H) 45 - 117 U/L    Protein, total 6.0 (L) 6.4 - 8.2 g/dL    Albumin 1.9 (L) 3.4 - 5.0 g/dL    Globulin 4.1 (H) 2.0 - 4.0 g/dL    A-G Ratio 0.5 (L) 0.8 - 1.7     GLUCOSE, POC    Collection Time: 01/11/17  7:54 AM   Result Value Ref Range    Glucose (POC) 125 (H) 70 - 110 mg/dL       Telemetry: normal sinus rhythm      Assessment:     Active Problems: Morbid obesity with BMI of 50.0-59.9, adult ([facility] [address] (9/21/2016)      Frequent falls (1/4/2017)      UTI (urinary tract infection) (1/4/2017)      Elevated LFTs (1/4/2017)      Fracture of left humerus (1/4/2017)      Hypoxia (1/4/2017)      Immobility (1/4/2017)        Plan:     The patient is stable from the cardiac standpoint. Rehab placement is pending.     Jose Perez MD

## 2017-01-11 NOTE — PROGRESS NOTES
0730 Bedside report received from Carlos Enrique Puckett RN. Pt is asleep. No signs of distress noted. Shift uneventful. Pt is stable with no signs of distress.

## 2017-01-11 NOTE — PROGRESS NOTES
Reviewed chart and discussed case with Dr. Corina Stubbs, who stated pt is not ready for discharge today, but anticipate possible discharge to SNF tomorrow. CM received message from Rickie Barroso at [facility] that they can accept pt if she is without bipap and down to 2L 02. CM informed [facility] that pt is not on bipap but is on 5L 02 and will continue to send updates to [facility] if pt's 02 requirements change. [facility] also accepted pt, but CM awaiting their response re: if they can accommodate pt's 02 needs. Will obtain pt's final decision re: facility to seek [facility].

## 2017-01-11 NOTE — PROGRESS NOTES
Shift summary: pt had total L shoulder arthroplasty on 1/9 by Dr. Matt Jacobs. L arm on a sling with Mepilex to L shoulder and BOLA. Bruising noted on bilateral arms, 3-4(+) pitting edema to BLE. Norco given for pain control. TLC on RIJ with good blood return - lab drawn from brown port and flushed well. Pt on IV antibiotics and Prednisone tapered dose which was started yesterday. Dewitt cath draining to gabriel hazy urine.  Lab results passed on to incoming nurse  Patient Vitals for the past 8 hrs:   Temp Pulse Resp BP SpO2   01/11/17 0752 97.9 °F (36.6 °C) 87 18 112/61 91 %

## 2017-01-11 NOTE — ROUTINE PROCESS
Bedside and Verbal shift change report given to Emerald Morales RN (oncoming nurse) by Clelia Bernheim, RN     (offgoing nurse). Report included the following information SBAR, Kardex, Intake/Output, MAR and Recent Results.

## 2017-01-11 NOTE — PROGRESS NOTES
Problem: Mobility Impaired (Adult and Pediatric)  Goal: *Acute Goals and Plan of Care (Insert Text)  Physical Therapy Goals  Initiated 1/5/2017 to be met within 2 days  STGs:  1. Rolling toward right with min/mod assist; Supine to/from supine with min assist in prep for ADL activity. 2. Tolerated sitting EOB 10+min for ADL activity. Physical Therapy Goals  Initiated 1/10/2017 and to be accomplished within 7 day(s)  Supine to sit to sit CGA/S with HR for positioning. Transfers: Sit to stand to chair CGA/S with LRAD for ADLs. Gait: Ambulate >100ft CGA/S with LRAD, no LOB, for home/community mobility. Stair Negotiation: Ascend/descend 6-inch box step min A/CGA with HR/HHA x 3 completions for home entry. Patient/Family Education: Independent with HEP and demonstrate competency with don/doff sling for follow-up care and safe discharge. Outcome: Progressing Towards Goal  PHYSICAL THERAPY TREATMENT     Patient: Caitie Bran (6 y.o. female)  Date: 1/11/2017  Diagnosis: fall  SHOULDER FRACTURE  Frequent falls <principal problem not specified>  Procedure(s) (LRB):  REVERSE LEFT TOTAL SHOULDER ARTHROPLASTY (Left) 2 Days Post-Op  Precautions: Fall   Chart, physical therapy assessment, plan of care and goals were reviewed. ASSESSMENT:  Pt is able to transfer to recliner chair fairly well, with assistance and max cuing. Overall bilateral LE strength is fair- and placement is appropriate with cuing. Pt agreeable to 2 Hr in chair. Progression toward goals:  [ ]      Improving appropriately and progressing toward goals  [X]      Improving slowly and progressing toward goals  [ ]      Not making progress toward goals and plan of care will be adjusted       PLAN:  Patient continues to benefit from skilled intervention to address the above impairments. Continue treatment per established plan of care.   Discharge Recommendations:  [facility]  Further Equipment Recommendations for Discharge: bedside commode and rolling walker       SUBJECTIVE:   Patient stated I can get into that chair.       OBJECTIVE DATA SUMMARY:   Critical Behavior:  Neurologic State: Alert, Appropriate for age  Orientation Level: Oriented X4  Cognition: Appropriate decision making  Safety/Judgement: Fall prevention  Functional Mobility Training:  Bed Mobility:  Rolling: Minimum assistance  Supine to Sit: Minimum assistance  Transfers:  Sit to Stand: Minimum assistance  Stand to Sit: Minimum assistance  Balance:  Sitting: Intact  Standing: Impaired  Standing - Static: Fair  Standing - Dynamic : Fair  Ambulation/Gait Training:  Distance (ft): 4 Feet (ft)  Assistive Device: Other (comment);Gait belt  Ambulation - Level of Assistance: Minimal assistance  Gait Abnormalities: Decreased step clearance;Shuffling gait  Base of Support: Widened  Stance: Weight shift  Speed/Gemini: Slow;Shuffled  Step Length: Right shortened;Left shortened  Swing Pattern: Right asymmetrical;Left asymmetrical  Interventions: Visual/Demos; Verbal cues; Tactile cues; Safety awareness training  Pain:  Pain Scale 1: Numeric (0 - 10)  Pain Intensity 1: 6  Pain Location 1: Arm; Shoulder  Pain Orientation 1: Left  Pain Description 1: Aching; Sharp  Pain Intervention(s) 1: Medication (see MAR)  Activity Tolerance:   Good  Please refer to the flowsheet for vital signs taken during this treatment.   After treatment:   [X] Patient left in no apparent distress sitting up in chair  [ ] Patient left in no apparent distress in bed  [X] Call bell left within reach  [X] Nursing notified  [X] Caregiver present  [ ] Bed alarm activated      Erin Pavon PTA   Time Calculation: 45 mins

## 2017-01-11 NOTE — ROUTINE PROCESS
Bedside and Verbal shift change report given to MALIKA Hickey RN (oncoming nurse) by Francia Mneeses   (offgoing nurse). Report included the following information SBAR, Intake/Output and MAR.

## 2017-01-11 NOTE — PROGRESS NOTES
Patient: Priscilla Alvarez               Sex: female          DOA: 1/3/2017         YOB: 1950      Age:  61 y.o.        LOS:  LOS: 7 days               Subjective:     Recovering from the left reverse total shoulder arthroplasty. Wound OK, NV intact. Active Problems: Morbid obesity with BMI of 50.0-59.9, adult ([facility] [address] (9/21/2016)      Frequent falls (1/4/2017)      UTI (urinary tract infection) (1/4/2017)      Elevated LFTs (1/4/2017)      Fracture of left humerus (1/4/2017)      Hypoxia (1/4/2017)      Immobility (1/4/2017)        Much more comfortable. Rehab placement pending.  F/U my office in 1-2 weeks

## 2017-01-11 NOTE — PROGRESS NOTES
Hospitalist Progress Note    Patient: John Arias MRN: [id number]  CSN: [id number]    YOB: 1950  Age: 61 y.o. Sex: female    DOA: 1/3/2017 LOS:  LOS: 7 days          Chief Complaint:    Shoulder replacement      Assessment/Plan     Humerus fracture-s/p shoulder replacement surgery-weaning 02 to off as soon as tolerated-stop IV abx now as no signs of active resp infection  No acute issues post-op  Atelectasis-encouraged IS use  Wean 02    NIDDM  Fatty liver disease  Morbid obesity  Immobility chronically  Fall at home  UTI-completed IV abx-stop levaquin now  Steroid taper as per pulm    PT, d/c more d/c planning to rehab next 24 hrs, wean off 02-dilaudid PRn pain      Patient Active Problem List   Diagnosis Code    S/P gastric bypass Z98.84    NAFLD (nonalcoholic fatty liver disease) K76.0    Morbid obesity with BMI of 50.0-59.9, adult ([facility] [address] E66.01, Z68.43    Frequent falls R29.6    UTI (urinary tract infection) N39.0    Elevated LFTs R79.89    Fracture of left humerus S42.302A    Hypoxia R09.02    Immobility Z74.09       Subjective:    Feeling better  Wants catheter out  Denies inc pain, but it is 8-9/10 in left shoulder this am      Review of systems:    Constitutional: denies fevers, chills, myalgias  Respiratory: denies SOB, cough  Cardiovascular: denies chest pain, palpitations  Gastrointestinal: denies nausea, vomiting, diarrhea      Vital signs/Intake and Output:  Visit Vitals    /61    Pulse 87    Temp 97.9 °F (36.6 °C)    Resp 18    Ht 5' 3\" (1.6 m)    Wt 146.1 kg (322 lb 1.6 oz)    SpO2 91%    BMI 57.06 kg/m2     Current Shift:     Last three shifts:  01/09 1901 - 01/11 0700  In: 5475.4 [P.O.:400;  I.V.:5075.4]  Out: 1825 [Urine:1825]    Exam:    General: Well developed, alert, NAD, OX3  CVS:Regular rate and rhythm, no M/R/G, S1/S2 heard, no thrill  Lungs:Clear to auscultation bilaterally, no wheezes, rhonchi, or rales  Abdomen: Soft, Nontender, No distention, Normal Bowel sounds, No hepatomegaly  Extremities: left shoulder in sling, edema 1-2 plus LE BL  Neuro:grossly normal , follows commands  Psych:appropriate                Labs: Results:       Chemistry Recent Labs      01/11/17   0440  01/10/17   0530  01/09/17   0804   GLU  120*  140*  109*   NA  138  139  139   K  4.5  5.2  4.5   CL  102  102  101   CO2  32  33*  32   BUN  15  14  17   CREA  0.62  0.76  0.77   CA  8.0*  8.2*  8.8   AGAP  4  4  6   BUCR  24*  18  22*   AP  210*  291*  167*   TP  6.0*  6.4  6.7   ALB  1.9*  2.0*  2.1*   GLOB  4.1*  4.4*  4.6*   AGRAT  0.5*  0.5*  0.5*      CBC w/Diff Recent Labs      01/11/17   0440  01/10/17   0530  01/09/17   0804   WBC  7.1  8.8  8.1   RBC  3.63*  4.08*  4.48   HGB  10.3*  11.6*  12.7   HCT  33.5*  37.4  40.5   PLT  271  253  245   GRANS  76*  78*  60   LYMPH  13*  12*  26   EOS  0  0  1      Cardiac Enzymes No results for input(s): CPK, CKND1, YESSENIA in the last 72 hours. No lab exists for component: CKRMB, TROIP   Coagulation No results for input(s): PTP, INR, APTT in the last 72 hours. No lab exists for component: INREXT    Lipid Panel No results found for: CHOL, CHOLPOCT, CHOLX, CHLST, CHOLV, [id number], HDL, LDL, NLDLCT, DLDL, LDLC, DLDLP, [id number], VLDLC, VLDL, TGL, TGLX, TRIGL, [id number], TRIGP, TGLPOCT, [id number], CHHD, CHHDX   BNP No results for input(s): BNPP in the last 72 hours.    Liver Enzymes Recent Labs      01/11/17 0440   TP  6.0*   ALB  1.9*   AP  210*   SGOT  123*      Thyroid Studies No results found for: T4, T3U, TSH, TSHEXT     Procedures/imaging: see electronic medical records for all procedures/Xrays and details which were not copied into this note but were reviewed prior to creation of [facility]

## 2017-01-11 NOTE — PROGRESS NOTES
[facility]  Pulmonary, Critical Care, and Sleep Medicine         Name: Jillian Espinoza MRN: [id number]   : 1950 Hospital: [facility] [place]   Date: 2017        Subjective:     Out of ICU. Not using bipap. On nc at 6L. Pain better controlled. This patient has been seen and evaluated at the request of Dr. Maximiliano Monet  for pre-op clearance. Patient is a 61 y.o. female with morbid obesity, mostly wheel chair bound, chronic controlled asthma, Admitted on 1/3 following fall out of wheel chair  Sustained left humerus fx. Pt apparently has been cleared for surgery from cardiac standpoint. But surgery is pending pulmonary clearance due to persistent cxr abnormalities. Pt reports that she is at her usual state except for pain left arm. No wheezing. No fever or chills. Chronic nonproductive cough. No headache. No changes in respiratory status. Past Medical History   Diagnosis Date    Active rheumatic fever with cardiac involvement     Anemia     Asthma     Chronic back pain     DDD (degenerative disc disease), cervical     Depression     Diabetes (HCC)     DVT (deep vein thrombosis) in pregnancy ([facility] [address]     Edema     Factor V Leiden mutation ([facility] [address]     Fatty liver disease, non-alcoholic     GERD (gastroesophageal reflux disease)     Heart abnormality     HNP (herniated nucleus pulposus)     Hypercholesterolemia     Knee pain     Lymphedema     Osteopenia     RLS (restless legs syndrome)     Spinal stenosis     Uterine endometrial cancer, sarcoma (HCC)       Past Surgical History   Procedure Laterality Date    Hx gastric bypass       In [facility] years [facility].  Hx darryl and bso      Hx carpal tunnel release      Hx gastric bypass      Pr lap,cholecystectomy        Prior to Admission medications    Medication Sig Start Date End Date Taking?  Authorizing Provider   glucosamine-chondroitin (ARTHX) 500-400 mg cap Take 1 Cap by mouth daily. Yes Historical Provider   HYDROcodone-acetaminophen (NORCO) 7.5-325 mg per tablet Take 1 Tab by mouth every six (6) hours as needed for Pain. Max Daily Amount: 4 Tabs. 1/3/17  Yes Massiel Castelan MD   budesonide-formoterol (SYMBICORT) 160-4.5 mcg/actuation HFA inhaler Take 2 Puffs by inhalation two (2) times a day. Yes Historical Provider   gabapentin (NEURONTIN) 100 mg capsule Take 300 mg by mouth two (2) times a day. 300 mg in evening, then 600 mg at bedtime  Indications: NEUROPATHIC PAIN   Yes Historical Provider   albuterol (PROVENTIL HFA, VENTOLIN HFA, PROAIR HFA) 90 mcg/actuation inhaler Take  by inhalation. Yes Historical Provider   fluticasone (FLONASE) 50 mcg/actuation nasal spray 2 Sprays by Both Nostrils route daily. Yes Historical Provider   risedronate (ACTONEL) 150 mg tablet Take 150 mg by mouth every thirty (30) days. Yes Historical Provider   rOPINIRole (REQUIP) 1 mg tablet Take 2 mg by mouth three (3) times daily. 1 mg in evening (1900), then 2 mg at bedtime (0100)  Indications: Restless Legs Syndrome   Yes Historical Provider   pravastatin (PRAVACHOL) 40 mg tablet Take 40 mg by mouth nightly. Yes Historical Provider   tolterodine (DETROL) 2 mg tablet Take 1 Tab by mouth two (2) times a day. Indications: BLADDER HYPERACTIVITY 9/21/16  Yes Jo Ann Coles MD   enoxaparin (LOVENOX) 60 mg/0.6 mL injection 60 mg by SubCUTAneous route daily. Yes Cedrick Ashraf MD   loratadine (CLARITIN) 10 mg tablet Take 10 mg by mouth daily. Yes Historical Provider   BUSPIRONE HCL (BUSPAR PO) Take 10 mg by mouth two (2) times a day. Yes Historical Provider   FUROSEMIDE (LASIX PO) Take 20 mg by mouth daily. Yes Historical Provider   MONTELUKAST SODIUM (SINGULAIR PO) Take  by mouth. Yes Historical Provider   TRAMADOL HCL (TRAMADOL PO) Take 50 mg by mouth four (4) times daily. Yes Historical Provider   TRAZODONE HCL (TRAZODONE PO) Take 200 mg by mouth nightly as needed for Other (sleep). Yes Historical Provider   SERTRALINE HCL (ZOLOFT PO) Take 100 mg by mouth daily. Yes Historical Provider   multivitamin (ONE A DAY) tablet Take 1 Tab by mouth daily. Historical Provider   calcium-vitamin D (OYSTER SHELL) 500 mg(1,250mg) -200 unit per tablet Take 1 Tab by mouth two (2) times daily (with meals). Historical Provider   omega-3 fatty acids-vitamin e (FISH OIL) 1,000 mg cap Take 1 Cap by mouth. Historical Provider   flaxseed oil 1,000 mg cap Take  by mouth. Historical Provider   acetaminophen (TYLENOL ARTHRITIS PAIN) 650 mg CR tablet Take 650 mg by mouth every six (6) hours as needed for Pain. Historical Provider     Allergies   Allergen Reactions    Augmentin [Amoxicillin-Pot Clavulanate] Other (comments)     Sever diarrhea    Codeine Unknown (comments)    Lodine [Etodolac] Unknown (comments)      Social History   Substance Use Topics    Smoking status: Never Smoker    Smokeless tobacco: Never Used    Alcohol use No      History reviewed. No pertinent family history.      Current Facility-Administered Medications   Medication Dose Route Frequency    sodium chloride (NS) flush 5-10 mL  5-10 mL IntraVENous Q8H    sodium chloride (NS) flush 5-10 mL  5-10 mL IntraVENous Q8H    aspirin delayed-release tablet 325 mg  325 mg Oral DAILY AFTER BREAKFAST    clindamycin phosphate 900 mg in 0.9% sodium chloride (MBP/ADV) 100 mL ADV  900 mg IntraVENous Q6H    predniSONE (DELTASONE) tablet 20 mg  20 mg Oral ACB    predniSONE (DELTASONE) tablet 10 mg  10 mg Oral PCL    predniSONE (DELTASONE) tablet 10 mg  10 mg Oral PCD    predniSONE (DELTASONE) tablet 20 mg  20 mg Oral QHS    [START ON 1/14/2017] predniSONE (DELTASONE) tablet 10 mg  10 mg Oral 12-DAY PREDNISONE DOSEPAK    [START ON 1/18/2017] predniSONE (DELTASONE) tablet 10 mg  10 mg Oral 12-DAY PREDNISONE DOSEPACK    polyethylene glycol (MIRALAX) packet 17 g  17 g Oral DAILY    sertraline (ZOLOFT) tablet 100 mg  100 mg Oral DAILY  busPIRone (BUSPAR) tablet 10 mg  10 mg Oral BID    pantoprazole (PROTONIX) tablet 40 mg  40 mg Oral DAILY    lactated ringers infusion 1,000 mL  1,000 mL IntraVENous CONTINUOUS    guaiFENesin SR (MUCINEX) tablet 600 mg  600 mg Oral Q12H    docusate sodium (COLACE) capsule 100 mg  100 mg Oral DAILY    nystatin (MYCOSTATIN) 100,000 unit/gram powder   Topical BID    furosemide (LASIX) tablet 40 mg  40 mg Oral DAILY    rOPINIRole (REQUIP) tablet 2 mg  2 mg Oral QHS    insulin lispro (HUMALOG) injection   SubCUTAneous AC&HS    gabapentin (NEURONTIN) capsule 300 mg  300 mg Oral QPM    gabapentin (NEURONTIN) capsule 600 mg  600 mg Oral QHS    rOPINIRole (REQUIP) tablet 1 mg  1 mg Oral QPM    potassium chloride (K-DUR, KLOR-CON) SR tablet 20 mEq  20 mEq Oral DAILY    diph,Pertuss(AC),Tet Vac-PF (BOOSTRIX) suspension 0.5 mL  0.5 mL IntraMUSCular PRIOR TO DISCHARGE    fluticasone-salmeterol (ADVAIR) 250mcg-50mcg/puff  1 Puff Inhalation BID    fluticasone (FLONASE) 50 mcg/actuation nasal spray 2 Spray  2 Spray Both Nostrils DAILY    tolterodine (DETROL) tablet 2 mg  2 mg Oral BID    loratadine (CLARITIN) tablet 10 mg  10 mg Oral DAILY    levoFLOXacin (LEVAQUIN) 500 mg in D5W IVPB  500 mg IntraVENous Q24H        ed.     Objective:   Vital Signs:    Visit Vitals    /61    Pulse 87    Temp 97.9 °F (36.6 °C)    Resp 18    Ht 5' 3\" (1.6 m)    Wt 146.1 kg (322 lb 1.6 oz)    SpO2 91%    BMI 57.06 kg/m2       O2 Device: Nasal cannula   O2 Flow Rate (L/min): 6 l/min   Temp (24hrs), Av.9 °F (36.6 °C), Min:97.4 °F (36.3 °C), Max:98.4 °F (36.9 °C)       Intake/Output:     Intake/Output Summary (Last 24 hours) at 17 1003  Last data filed at 17 0644   Gross per 24 hour   Intake           3802.5 ml   Output             1425 ml   Net           2377.5 ml         Physical Exam:   General: comfortable  Neck: No adenopathy or thyroid swelling  CVS: S1S2 no murmurs  RS: Mod AE bilaterally, decreased BS with crackles at bases  Abd: soft, non tender, no hepatosplenomegaly  Neuro: non focal, awake, alert  Extrm: no leg edema   Skin: no rash    Data review:   Labs:  Recent Labs      01/11/17   0440  01/10/17   0530  01/09/17   0804   WBC  7.1  8.8  8.1   HGB  10.3*  11.6*  12.7   HCT  33.5*  37.4  40.5   PLT  271  253  245     Recent Labs      01/11/17   0440  01/10/17   0530  01/09/17   0804   NA  138  139  139   K  4.5  5.2  4.5   CL  102  102  101   CO2  32  33*  32   GLU  120*  140*  109*   BUN  15  14  17   CREA  0.62  0.76  0.77   CA  8.0*  8.2*  8.8   ALB  1.9*  2.0*  2.1*   SGOT  123*  315*  102*   ALT  149*  222*  122*     No results for input(s): PH, PCO2, PO2, HCO3, FIO2 in the last 72 hours. Imaging:  I have personally reviewed the patients radiographs and have reviewed the reports: Allergies        Unspecified: Augmentin [Amoxicillin-pot Clavulanate]; Codeine; Lodine [Etodolac]       Result Information      Status Provider Status        Final result (Exam End: 1/9/2017  8:29 AM) Open        Study Result      Chest, single view     Indication: Pneumonia, follow-up examination     Comparison: Several prior examinations, most recently 1/6/2017     Findings: Portable upright AP view of the chest was obtained. The lungs are  progressively underexpanded with redemonstration of bibasilar airspace  opacities, right greater than left. Linear atelectasis noted, similar to prior. No pneumothorax or large pleural effusion. Cardiac size and mediastinal contours  are stable. No new acute osseous abnormality demonstrated, with similar  radiographic appearance to a comminuted proximal left humeral fracture.     IMPRESSION  Impression:  Progressively underexpanded lungs with right basilar airspace opacity which may  represent atelectasis or pneumonia. IMPRESSION:   · Rt LL atelectasis,  I don't think this is pneumonia.   Primarily hypoventilation issues  · Morbid obesity  · Acute left humerus fx s/p repair  · Chronic hypoxia due to hypoventilation and possibly yaw  · Hx of factor V Leiden      RECOMMENDATIONS:   · Wean steroid  · Continue bd  · Continue abx for uti  · Continue lovenox of dvt proph and scd  · Change IVF to NS as LR contains K,  Current K level 4.5  · Will sign off             Yohana Garber MD

## 2017-01-12 LAB
ALBUMIN SERPL BCP-MCNC: 2.2 G/DL (ref 3.4–5)
ALBUMIN/GLOB SERPL: 0.5 {RATIO} (ref 0.8–1.7)
ALP SERPL-CCNC: 171 U/L (ref 45–117)
ALT SERPL-CCNC: 96 U/L (ref 13–56)
ANION GAP BLD CALC-SCNC: 8 MMOL/L (ref 3–18)
AST SERPL W P-5'-P-CCNC: 65 U/L (ref 15–37)
BASOPHILS # BLD AUTO: 0 K/UL (ref 0–0.06)
BASOPHILS # BLD: 0 % (ref 0–2)
BILIRUB SERPL-MCNC: 0.6 MG/DL (ref 0.2–1)
BUN SERPL-MCNC: 17 MG/DL (ref 7–18)
BUN/CREAT SERPL: 24 (ref 12–20)
CALCIUM SERPL-MCNC: 8.5 MG/DL (ref 8.5–10.1)
CHLORIDE SERPL-SCNC: 103 MMOL/L (ref 100–108)
CO2 SERPL-SCNC: 29 MMOL/L (ref 21–32)
CREAT SERPL-MCNC: 0.71 MG/DL (ref 0.6–1.3)
DIFFERENTIAL METHOD BLD: ABNORMAL
EOSINOPHIL # BLD: 0 K/UL (ref 0–0.4)
EOSINOPHIL NFR BLD: 0 % (ref 0–5)
ERYTHROCYTE [DISTWIDTH] IN BLOOD BY AUTOMATED COUNT: 15 % (ref 11.6–14.5)
GLOBULIN SER CALC-MCNC: 4.1 G/DL (ref 2–4)
GLUCOSE BLD STRIP.AUTO-MCNC: 102 MG/DL (ref 70–110)
GLUCOSE BLD STRIP.AUTO-MCNC: 119 MG/DL (ref 70–110)
GLUCOSE BLD STRIP.AUTO-MCNC: 119 MG/DL (ref 70–110)
GLUCOSE BLD STRIP.AUTO-MCNC: 153 MG/DL (ref 70–110)
GLUCOSE SERPL-MCNC: 133 MG/DL (ref 74–99)
HCT VFR BLD AUTO: 35.4 % (ref 35–45)
HGB BLD-MCNC: 11 G/DL (ref 12–16)
LYMPHOCYTES # BLD AUTO: 26 % (ref 21–52)
LYMPHOCYTES # BLD: 2.5 K/UL (ref 0.9–3.6)
MCH RBC QN AUTO: 28.1 PG (ref 24–34)
MCHC RBC AUTO-ENTMCNC: 31.1 G/DL (ref 31–37)
MCV RBC AUTO: 90.3 FL (ref 74–97)
MONOCYTES # BLD: 1.2 K/UL (ref 0.05–1.2)
MONOCYTES NFR BLD AUTO: 13 % (ref 3–10)
NEUTS SEG # BLD: 5.8 K/UL (ref 1.8–8)
NEUTS SEG NFR BLD AUTO: 61 % (ref 40–73)
PLATELET # BLD AUTO: 360 K/UL (ref 135–420)
PMV BLD AUTO: 10.9 FL (ref 9.2–11.8)
POTASSIUM SERPL-SCNC: 4.1 MMOL/L (ref 3.5–5.5)
PROT SERPL-MCNC: 6.3 G/DL (ref 6.4–8.2)
RBC # BLD AUTO: 3.92 M/UL (ref 4.2–5.3)
SODIUM SERPL-SCNC: 140 MMOL/L (ref 136–145)
WBC # BLD AUTO: 9.6 K/UL (ref 4.6–13.2)

## 2017-01-12 PROCEDURE — 74011250636 HC RX REV CODE- 250/636: Performed by: HOSPITALIST

## 2017-01-12 PROCEDURE — 74011250637 HC RX REV CODE- 250/637: Performed by: FAMILY MEDICINE

## 2017-01-12 PROCEDURE — 74011250637 HC RX REV CODE- 250/637: Performed by: HOSPITALIST

## 2017-01-12 PROCEDURE — 65270000029 HC RM PRIVATE

## 2017-01-12 PROCEDURE — 74011636637 HC RX REV CODE- 636/637: Performed by: FAMILY MEDICINE

## 2017-01-12 PROCEDURE — 77010033678 HC OXYGEN DAILY

## 2017-01-12 PROCEDURE — 97535 SELF CARE MNGMENT TRAINING: CPT

## 2017-01-12 PROCEDURE — 80053 COMPREHEN METABOLIC PANEL: CPT | Performed by: INTERNAL MEDICINE

## 2017-01-12 PROCEDURE — 97530 THERAPEUTIC ACTIVITIES: CPT

## 2017-01-12 PROCEDURE — 97116 GAIT TRAINING THERAPY: CPT

## 2017-01-12 PROCEDURE — 82962 GLUCOSE BLOOD TEST: CPT

## 2017-01-12 PROCEDURE — 74011636637 HC RX REV CODE- 636/637: Performed by: INTERNAL MEDICINE

## 2017-01-12 PROCEDURE — 85025 COMPLETE CBC W/AUTO DIFF WBC: CPT | Performed by: INTERNAL MEDICINE

## 2017-01-12 PROCEDURE — 74011250637 HC RX REV CODE- 250/637: Performed by: INTERNAL MEDICINE

## 2017-01-12 RX ORDER — PREDNISONE 10 MG/1
10 TABLET ORAL
Qty: 5 TAB | Refills: 0 | Status: SHIPPED | OUTPATIENT
Start: 2017-01-12 | End: 2017-01-17

## 2017-01-12 RX ORDER — HYDROCODONE BITARTRATE AND ACETAMINOPHEN 7.5; 325 MG/1; MG/1
1 TABLET ORAL
Qty: 20 TAB | Refills: 0 | Status: SHIPPED | OUTPATIENT
Start: 2017-01-12 | End: 2017-01-13

## 2017-01-12 RX ORDER — DOCUSATE SODIUM 100 MG/1
100 CAPSULE, LIQUID FILLED ORAL 2 TIMES DAILY
Qty: 60 CAP | Refills: 2 | Status: SHIPPED | OUTPATIENT
Start: 2017-01-12 | End: 2017-01-13

## 2017-01-12 RX ORDER — TRAMADOL HYDROCHLORIDE 50 MG/1
50 TABLET ORAL
Qty: 20 TAB | Refills: 0 | Status: SHIPPED | OUTPATIENT
Start: 2017-01-12 | End: 2017-01-13

## 2017-01-12 RX ORDER — POLYETHYLENE GLYCOL 3350 17 G/17G
17 POWDER, FOR SOLUTION ORAL DAILY
Qty: 1 PACKET | Refills: 0 | Status: SHIPPED | OUTPATIENT
Start: 2017-01-12 | End: 2021-07-28

## 2017-01-12 RX ADMIN — GABAPENTIN 600 MG: 300 CAPSULE ORAL at 22:33

## 2017-01-12 RX ADMIN — TOLTERODINE TARTRATE 2 MG: 2 TABLET, FILM COATED ORAL at 10:20

## 2017-01-12 RX ADMIN — POTASSIUM CHLORIDE 20 MEQ: 20 TABLET, EXTENDED RELEASE ORAL at 10:20

## 2017-01-12 RX ADMIN — ROPINIROLE 2 MG: 1 TABLET, FILM COATED ORAL at 00:24

## 2017-01-12 RX ADMIN — FLUTICASONE PROPIONATE 2 SPRAY: 50 SPRAY, METERED NASAL at 10:22

## 2017-01-12 RX ADMIN — GUAIFENESIN 600 MG: 600 TABLET, EXTENDED RELEASE ORAL at 10:20

## 2017-01-12 RX ADMIN — ENOXAPARIN SODIUM 60 MG: 60 INJECTION SUBCUTANEOUS at 14:22

## 2017-01-12 RX ADMIN — HYDROCODONE BITARTRATE AND ACETAMINOPHEN 1 TABLET: 7.5; 325 TABLET ORAL at 16:13

## 2017-01-12 RX ADMIN — BUSPIRONE HYDROCHLORIDE 10 MG: 5 TABLET ORAL at 10:20

## 2017-01-12 RX ADMIN — INSULIN LISPRO 2 UNITS: 100 INJECTION, SOLUTION INTRAVENOUS; SUBCUTANEOUS at 14:22

## 2017-01-12 RX ADMIN — HYDROCODONE BITARTRATE AND ACETAMINOPHEN 1 TABLET: 7.5; 325 TABLET ORAL at 10:20

## 2017-01-12 RX ADMIN — TOLTERODINE TARTRATE 2 MG: 2 TABLET, FILM COATED ORAL at 22:33

## 2017-01-12 RX ADMIN — DOCUSATE SODIUM 100 MG: 100 CAPSULE, LIQUID FILLED ORAL at 10:20

## 2017-01-12 RX ADMIN — POLYETHYLENE GLYCOL 3350 17 G: 17 POWDER, FOR SOLUTION ORAL at 10:21

## 2017-01-12 RX ADMIN — Medication 10 ML: at 06:46

## 2017-01-12 RX ADMIN — FLUTICASONE PROPIONATE AND SALMETEROL 1 PUFF: 50; 250 POWDER RESPIRATORY (INHALATION) at 10:21

## 2017-01-12 RX ADMIN — PANTOPRAZOLE SODIUM 40 MG: 40 TABLET, DELAYED RELEASE ORAL at 10:20

## 2017-01-12 RX ADMIN — PREDNISONE 20 MG: 20 TABLET ORAL at 22:33

## 2017-01-12 RX ADMIN — ROPINIROLE 1 MG: 1 TABLET, FILM COATED ORAL at 18:52

## 2017-01-12 RX ADMIN — PREDNISONE 20 MG: 20 TABLET ORAL at 06:45

## 2017-01-12 RX ADMIN — TRAZODONE HYDROCHLORIDE 100 MG: 100 TABLET, FILM COATED ORAL at 00:32

## 2017-01-12 RX ADMIN — TRAZODONE HYDROCHLORIDE 100 MG: 100 TABLET, FILM COATED ORAL at 22:35

## 2017-01-12 RX ADMIN — HYDROCODONE BITARTRATE AND ACETAMINOPHEN 1 TABLET: 7.5; 325 TABLET ORAL at 22:33

## 2017-01-12 RX ADMIN — PREDNISONE 10 MG: 10 TABLET ORAL at 18:47

## 2017-01-12 RX ADMIN — FLUTICASONE PROPIONATE AND SALMETEROL 1 PUFF: 50; 250 POWDER RESPIRATORY (INHALATION) at 22:34

## 2017-01-12 RX ADMIN — SERTRALINE HYDROCHLORIDE 100 MG: 50 TABLET ORAL at 10:20

## 2017-01-12 RX ADMIN — GABAPENTIN 300 MG: 300 CAPSULE ORAL at 18:47

## 2017-01-12 RX ADMIN — PREDNISONE 10 MG: 10 TABLET ORAL at 14:22

## 2017-01-12 RX ADMIN — DOCUSATE SODIUM 100 MG: 100 CAPSULE, LIQUID FILLED ORAL at 22:33

## 2017-01-12 RX ADMIN — HYDROCODONE BITARTRATE AND ACETAMINOPHEN 1 TABLET: 7.5; 325 TABLET ORAL at 04:01

## 2017-01-12 RX ADMIN — BUSPIRONE HYDROCHLORIDE 10 MG: 5 TABLET ORAL at 22:33

## 2017-01-12 RX ADMIN — NYSTATIN: 100000 POWDER TOPICAL at 22:34

## 2017-01-12 RX ADMIN — GUAIFENESIN 600 MG: 600 TABLET, EXTENDED RELEASE ORAL at 22:33

## 2017-01-12 RX ADMIN — FUROSEMIDE 40 MG: 40 TABLET ORAL at 10:20

## 2017-01-12 RX ADMIN — NYSTATIN: 100000 POWDER TOPICAL at 10:24

## 2017-01-12 RX ADMIN — LORATADINE 10 MG: 10 TABLET ORAL at 10:20

## 2017-01-12 NOTE — PROGRESS NOTES
Problem: Mobility Impaired (Adult and Pediatric)  Goal: *Acute Goals and Plan of Care (Insert Text)  Physical Therapy Goals  Initiated 1/5/2017 to be met within 2 days  STGs:  1. Rolling toward right with min/mod assist; Supine to/from supine with min assist in prep for ADL activity. 2. Tolerated sitting EOB 10+min for ADL activity. Physical Therapy Goals  Initiated 1/10/2017 and to be accomplished within 7 day(s)  Supine to sit to sit CGA/S with HR for positioning. Transfers: Sit to stand to chair CGA/S with LRAD for ADLs. Gait: Ambulate >100ft CGA/S with LRAD, no LOB, for home/community mobility. Stair Negotiation: Ascend/descend 6-inch box step min A/CGA with HR/HHA x 3 completions for home entry. Patient/Family Education: Independent with HEP and demonstrate competency with don/doff sling for follow-up care and safe discharge. Outcome: Progressing Towards Goal  PHYSICAL THERAPY TREATMENT     Patient: Do Meeks (14 y.o. female)  Date: 1/12/2017  Diagnosis: fall  SHOULDER FRACTURE  Frequent falls <principal problem not specified>  Procedure(s) (LRB):  REVERSE LEFT TOTAL SHOULDER ARTHROPLASTY (Left) 3 Days Post-Op  Precautions: Fall   Chart, physical therapy assessment, plan of care and goals were reviewed. ASSESSMENT:  Pt with C/o Left lateral femur pain and attempted to hyde session due to this discomfort. Pt is able to demonstrate ambulation to/for hallway without rest break, but with slow pace. Pt requires intense cuing to increase effort and attempt independent movement. Will continue to follow  Progression toward goals:  [ ]      Improving appropriately and progressing toward goals  [X]      Improving slowly and progressing toward goals  [ ]      Not making progress toward goals and plan of care will be adjusted       PLAN:  Patient continues to benefit from skilled intervention to address the above impairments. Continue treatment per established plan of care.   Discharge Recommendations:  Skilled Nursing Facility  Further Equipment Recommendations for Discharge:  bedside commode and rolling walker       SUBJECTIVE:   Patient stated I'm in too much pain. Why?      OBJECTIVE DATA SUMMARY:   Critical Behavior:  Neurologic State: Alert  Orientation Level: Oriented X4  Cognition: Follows commands  Safety/Judgement: Fall prevention  Functional Mobility Training:  Bed Mobility:  Rolling: Supervision; Additional time  Supine to Sit: Supervision; Additional time  Sit to Supine: Minimum assistance; Additional time  Scooting: Supervision; Additional time  Transfers:  Sit to Stand: Contact guard assistance  Stand to Sit: Contact guard assistance  Balance:  Sitting: Intact  Sitting - Static: Good (unsupported)  Sitting - Dynamic: Good (unsupported)  Standing: Impaired  Standing - Static: Good  Standing - Dynamic : Fair  Ambulation/Gait Training:  Distance (ft): 30 Feet (ft)  Assistive Device: Gait belt  Ambulation - Level of Assistance: Contact guard assistance  Gait Abnormalities: Decreased step clearance;Shuffling gait  Base of Support: Shift to right;Shift to left  Speed/Gemini: Slow  Step Length: Right shortened;Left shortened  Swing Pattern: Right asymmetrical;Left asymmetrical  Pain:  Pain Scale 1: Numeric (0 - 10)  Pain Intensity 1: 8  Pain Location 1: Leg  Pain Orientation 1: Left  Pain Description 1: Aching  Pain Intervention(s) 1: Medication (see MAR)  Activity Tolerance:   Fair  Please refer to the flowsheet for vital signs taken during this treatment.   After treatment:   [ ] Patient left in no apparent distress sitting up in chair  [X] Patient left in no apparent distress in bed  [X] Call bell left within reach  [X] Nursing notified  [ ] Caregiver present  [ ] Bed alarm activated      Erin Pavon PTA   Time Calculation: 30 mins

## 2017-01-12 NOTE — ROUTINE PROCESS
Bedside, Verbal and Written shift change report given to MALIKA Celaya (oncoming nurse) by Alex Mcfarlane (offgoing nurse). Report included the following information SBAR, Kardex, Intake/Output and Recent Results. Assumed care of pt laying in bed, a/o x 4, following commands, denies pain at this time, 2L NC, aguero, dressings to right shin and calf, left shoulder. 2345  Bedside, Verbal and Written shift change report given to MALIKA Kelsey RN (oncoming nurse) by MALIKA Celaya (offgoing nurse). Report included the following information SBAR, Kardex, Intake/Output and Recent Results.

## 2017-01-12 NOTE — WOUND CARE
Patient seen during hospital wide pressure injury prevalence. Pt with known wounds to legs from fall PTA, dressings changed. Wound care will continue to monitor during admission.

## 2017-01-12 NOTE — PROGRESS NOTES
Shift summary: pt is S/P total L shoulder arthroplasty done on 1/9. Sling to L arm, Mepilex to L shouder and arm, Mepilex to R breast, R buttock, and R knee. TLC on R IJ with LR going at 125 ml/hr. Bruises to arms and legs noted, medicated with Norco for pain control. Dewitt removed midshift and pt instructed to call if needed to use toilet. On 2 li O2 without any distress noted. Plan for discharge in am. Pt up with PT yesterday.  Shift uneventful

## 2017-01-12 NOTE — ROUTINE PROCESS
Bedside and Verbal shift change report given to Suyapa Stephens (oncoming nurse) by Ventura Jimenez RN   (offgoing nurse). Report included the following information SBAR, Kardex, Procedure Summary, Intake/Output, MAR and Med Rec Status.

## 2017-01-12 NOTE — PROGRESS NOTES
Problem: Mobility Impaired (Adult and Pediatric)  Goal: *Acute Goals and Plan of Care (Insert Text)  Physical Therapy Goals  Initiated 1/5/2017 to be met within 2 days  STGs:  1. Rolling toward right with min/mod assist; Supine to/from supine with min assist in prep for ADL activity. 2. Tolerated sitting EOB 10+min for ADL activity. Physical Therapy Goals  Initiated 1/10/2017 and to be accomplished within 7 day(s)  Supine to sit to sit CGA/S with HR for positioning. Transfers: Sit to stand to chair CGA/S with LRAD for ADLs. Gait: Ambulate >100ft CGA/S with LRAD, no LOB, for home/community mobility. Stair Negotiation: Ascend/descend 6-inch box step min A/CGA with HR/HHA x 3 completions for home entry. Patient/Family Education: Independent with HEP and demonstrate competency with don/doff sling for follow-up care and safe discharge. Outcome: Progressing Towards Goal  PHYSICAL THERAPY TREATMENT     Patient: Antonio Elaine (45 y.o. female)  Date: 1/12/2017  Diagnosis: fall  SHOULDER FRACTURE  Frequent falls <principal problem not specified>  Procedure(s) (LRB):  REVERSE LEFT TOTAL SHOULDER ARTHROPLASTY (Left) 3 Days Post-Op  Precautions: Fall   Chart, physical therapy assessment, plan of care and goals were reviewed. ASSESSMENT:  Pt progressing well with ambulation and transfers. Pt now requesting sling, in preparation for transfer to SNF. Pt will benefit from Quad cane for short distance able, as she uses wall and furniture coasting. Refusing chair at end of session. Progression toward goals:  [ ]      Improving appropriately and progressing toward goals  [X]      Improving slowly and progressing toward goals  [ ]      Not making progress toward goals and plan of care will be adjusted       PLAN:  Patient continues to benefit from skilled intervention to address the above impairments. Continue treatment per established plan of care.   Discharge Recommendations:  Bryan Brooke Equipment Recommendations for Discharge:  quad cane       SUBJECTIVE:   Patient stated Kerry Tracey got that catheter out!       OBJECTIVE DATA SUMMARY:   Critical Behavior:  Neurologic State: Alert  Orientation Level: Oriented X4  Cognition: Follows commands  Safety/Judgement: Fall prevention  Functional Mobility Training:  Bed Mobility:  Rolling: Supervision; Additional time  Supine to Sit: Supervision; Additional time  Sit to Supine: Minimum assistance  Scooting: Supervision  Transfers:  Sit to Stand: Contact guard assistance;Minimum assistance  Stand to Sit: Contact guard assistance;Minimum assistance  Balance:  Sitting: Intact  Sitting - Static: Good (unsupported)  Sitting - Dynamic: Good (unsupported)  Standing: Impaired  Standing - Static: Good  Standing - Dynamic : Fair  Ambulation/Gait Training:  Distance (ft): 30 Feet (ft)  Assistive Device: Gait belt  Ambulation - Level of Assistance: Contact guard assistance  Gait Abnormalities: Decreased step clearance;Shuffling gait  Base of Support: Shift to right;Shift to left  Speed/Gemini: Slow  Step Length: Right shortened;Left shortened  Therapeutic Exercises:   Fair+  Pain:  Pain Scale 1: Visual  Pain Intensity 1: 0  Activity Tolerance:   Good  Please refer to the flowsheet for vital signs taken during this treatment.   After treatment:   [ ] Patient left in no apparent distress sitting up in chair  [X] Patient left in no apparent distress in bed  [X] Call bell left within reach  [X] Nursing notified  [ ] Caregiver present  [ ] Bed alarm activated      Kary Grissom PTA   Time Calculation: 45 mins

## 2017-01-12 NOTE — PROGRESS NOTES
Cardiology Progress Note      1/12/2017 2:18 PM    Admit Date: 1/3/2017    Admit Diagnosis: fall  SHOULDER FRACTURE  Frequent falls      Subjective:     Josh Blunt denies chest pain, chest pressure/discomfort, dyspnea, palpitations.     Visit Vitals    /41 (BP 1 Location: Right arm, BP Patient Position: At rest)    Pulse 93    Temp 97.6 °F (36.4 °C)    Resp 20    Ht 5' 3\" (1.6 m)    Wt 144.7 kg (319 lb)    SpO2 91%    BMI 56.51 kg/m2     Current Facility-Administered Medications   Medication Dose Route Frequency    docusate sodium (COLACE) capsule 100 mg  100 mg Oral BID    enoxaparin (LOVENOX) injection 60 mg  60 mg SubCUTAneous Q24H    sodium chloride (NS) flush 5-10 mL  5-10 mL IntraVENous Q8H    sodium chloride (NS) flush 5-10 mL  5-10 mL IntraVENous PRN    naloxone (NARCAN) injection 0.1 mg  0.1 mg IntraVENous PRN    diphenhydrAMINE (BENADRYL) injection 12.5 mg  12.5 mg IntraVENous Q6H PRN    sodium chloride (NS) flush 5-10 mL  5-10 mL IntraVENous Q8H    sodium chloride (NS) flush 5-10 mL  5-10 mL IntraVENous PRN    acetaminophen (TYLENOL) tablet 650 mg  650 mg Oral Q4H PRN    diphenhydrAMINE (BENADRYL) capsule 25 mg  25 mg Oral Q4H PRN    predniSONE (DELTASONE) tablet 20 mg  20 mg Oral ACB    predniSONE (DELTASONE) tablet 10 mg  10 mg Oral PCL    predniSONE (DELTASONE) tablet 10 mg  10 mg Oral PCD    predniSONE (DELTASONE) tablet 20 mg  20 mg Oral QHS    [START ON 1/14/2017] predniSONE (DELTASONE) tablet 10 mg  10 mg Oral 12-DAY PREDNISONE DOSEPAK    [START ON 1/18/2017] predniSONE (DELTASONE) tablet 10 mg  10 mg Oral 12-DAY PREDNISONE DOSEPACK    polyethylene glycol (MIRALAX) packet 17 g  17 g Oral DAILY    sertraline (ZOLOFT) tablet 100 mg  100 mg Oral DAILY    busPIRone (BUSPAR) tablet 10 mg  10 mg Oral BID    pantoprazole (PROTONIX) tablet 40 mg  40 mg Oral DAILY    guaiFENesin SR (MUCINEX) tablet 600 mg  600 mg Oral Q12H    nystatin (MYCOSTATIN) 100,000 unit/gram powder   Topical BID    furosemide (LASIX) tablet 40 mg  40 mg Oral DAILY    rOPINIRole (REQUIP) tablet 2 mg  2 mg Oral QHS    insulin lispro (HUMALOG) injection   SubCUTAneous AC&HS    glucose chewable tablet 16 g  4 Tab Oral PRN    glucagon (GLUCAGEN) injection 1 mg  1 mg IntraMUSCular PRN    dextrose (D50W) injection syrg 12.5-25 g  25-50 mL IntraVENous PRN    gabapentin (NEURONTIN) capsule 300 mg  300 mg Oral QPM    gabapentin (NEURONTIN) capsule 600 mg  600 mg Oral QHS    rOPINIRole (REQUIP) tablet 1 mg  1 mg Oral QPM    traZODone (DESYREL) tablet 100 mg  100 mg Oral QHS PRN    potassium chloride (K-DUR, KLOR-CON) SR tablet 20 mEq  20 mEq Oral DAILY    diph,Pertuss(AC),Tet Vac-PF (BOOSTRIX) suspension 0.5 mL  0.5 mL IntraMUSCular PRIOR TO DISCHARGE    HYDROcodone-acetaminophen (NORCO) 7.5-325 mg per tablet 1 Tab  1 Tab Oral Q6H PRN    fluticasone-salmeterol (ADVAIR) 250mcg-50mcg/puff  1 Puff Inhalation BID    fluticasone (FLONASE) 50 mcg/actuation nasal spray 2 Spray  2 Spray Both Nostrils DAILY    tolterodine (DETROL) tablet 2 mg  2 mg Oral BID    loratadine (CLARITIN) tablet 10 mg  10 mg Oral DAILY    albuterol-ipratropium (DUO-NEB) 2.5 MG-0.5 MG/3 ML  3 mL Nebulization Q4H PRN         Objective:      Physical Exam:  Visit Vitals    /41 (BP 1 Location: Right arm, BP Patient Position: At rest)    Pulse 93    Temp 97.6 °F (36.4 °C)    Resp 20    Ht 5' 3\" (1.6 m)    Wt 144.7 kg (319 lb)    SpO2 91%    BMI 56.51 kg/m2     General Appearance:  Well developed, well nourished,alert and oriented x 3, and individual in no acute distress. Ears/Nose/Mouth/Throat:   Hearing grossly normal.         Neck: Supple. Chest:   Lungs clear to auscultation bilaterally. Cardiovascular:  Regular rate and rhythm, S1, S2 normal, no murmur. Abdomen:   Soft, non-tender, bowel sounds are active. Extremities: No edema bilaterally. Skin: Warm and dry.                Data Review:   Labs: Recent Results (from the past 24 hour(s))   GLUCOSE, POC    Collection Time: 01/11/17  4:27 PM   Result Value Ref Range    Glucose (POC) 113 (H) 70 - 110 mg/dL   GLUCOSE, POC    Collection Time: 01/11/17  9:12 PM   Result Value Ref Range    Glucose (POC) 126 (H) 70 - 110 mg/dL   GLUCOSE, POC    Collection Time: 01/12/17  7:43 AM   Result Value Ref Range    Glucose (POC) 102 70 - 110 mg/dL   GLUCOSE, POC    Collection Time: 01/12/17 11:46 AM   Result Value Ref Range    Glucose (POC) 153 (H) 70 - 110 mg/dL       Telemetry: normal sinus rhythm      Assessment:     Active Problems: Morbid obesity with BMI of 50.0-59.9, adult ([address] (9/21/2016)      Frequent falls (1/4/2017)      UTI (urinary tract infection) (1/4/2017)      Elevated LFTs (1/4/2017)      Fracture of left humerus (1/4/2017)      Hypoxia (1/4/2017)      Immobility (1/4/2017)        Plan:     Stable. Rehab placement perhaps today.     Heide Ornelas MD

## 2017-01-12 NOTE — PROGRESS NOTES
Problem: Self Care Deficits Care Plan (Adult)  Goal: *Acute Goals and Plan of Care (Insert Text)  Occupational Therapy Goals  Initiated 1/6/2017 within 7 day(s). 1. Patient will perform grooming with supervision/set-up   2. Patient will perform upper body dressing with minimal assistance/contact guard assist.  3. Patient will perform lower body dressing with moderate assistance . 4. Patient will participate in UE exercises to improve UE ROM and strength increased independence with ADLs (RUE only)  5. Patient will perform toilet transfer with moderate assistance     Occupational Therapy Goals  Initiated 1/10/2017 within 7 day(s). 1. Patient will perform grooming with supervision/set-up   2. Patient will perform upper body dressing with minimal assistance/contact guard assist.  3. Patient will perform lower body dressing with moderate assistance . 4. Patient will participate in UE exercises to improve UE ROM and strength increased independence with ADLs  5. Patient will perform toilet transfer with supervision  6. Patient will perform all aspects of toileting with supervision   Outcome: Progressing Towards Goal  OCCUPATIONAL THERAPY TREATMENT     Patient: Farhan Mcconnell (46 y.o. female)  Date: 1/12/2017  Diagnosis: fall  SHOULDER FRACTURE  Frequent falls <principal problem not specified>  Procedure(s) (LRB):  REVERSE LEFT TOTAL SHOULDER ARTHROPLASTY (Left) 3 Days Post-Op  Precautions: Fall  Chart, occupational therapy assessment, plan of care, and goals were reviewed. ASSESSMENT:  Pt supine in bed upon entering, agreeable to therapy. Pt completed supine to sit transfer with SBA-CGA. While seated EOB, pt washed hair with shower cap. Pt required assist to don shower cap, however was able to use RUE to massage/wash hair. Pt was able to reach all areas to comb hair, however required assist secondary to increased tangling. Pt required max A to put hair in ponytail. Pt washed face with set up assistance.  Pt completed sit to stand and completed side steps with CGA. Pt required min A to return to supine. EDUCATION: safety during transfers, energy conservation, exercises with LUE  Progression toward goals:  [ ]          Improving appropriately and progressing toward goals  [X]          Improving slowly and progressing toward goals  [ ]          Not making progress toward goals and plan of care will be adjusted       PLAN:  Patient continues to benefit from skilled intervention to address the above impairments. Continue treatment per established plan of care. Discharge Recommendations:  Rehab  Further Equipment Recommendations for Discharge:  TBD by next level of care       SUBJECTIVE:   Patient stated .      OBJECTIVE DATA SUMMARY:      GCODES(GO):n/a     Cognitive/Behavioral Status:  Neurologic State: Alert  Orientation Level: Oriented X4  Cognition: Follows commands  Safety/Judgement: Fall prevention  Functional Mobility and Transfers for ADLs:              Bed Mobility:  Supine to Sit: Contact guard assistance  Sit to Supine: Minimum assistance                 Transfers:  Sit to Stand: Contact guard assistance                 Balance:  Sitting: Intact  Standing: Impaired  Standing - Static: Fair  Standing - Dynamic : Fair  ADL Intervention:  Grooming  Grooming Assistance: Minimum assistance  Washing Face: Supervision/set-up  Brushing/Combing Hair: Minimum assistance; Moderate assistance     Lower Body Dressing Assistance  Dressing Assistance: Maximum assistance  Socks: Maximum assistance     Cognitive Retraining  Safety/Judgement: Fall prevention     Therapeutic Exercises:   Encouraged to complete hand/wrist/elbow ROM while supine in bed     Pain:  Pre Treatment:  Post Treatment:  Pain Scale 1: Visual  Pain Intensity 1: 0  Pain Location 1: Shoulder  Pain Orientation 1: Left  Pain Description 1: Aching; Throbbing  Pain Intervention(s) 1: Medication (see MAR)     Activity Tolerance:    fair  Please refer to the flowsheet for vital signs taken during this treatment.   After treatment:   [ ]  Patient left in no apparent distress sitting up in chair  [X]  Patient left in no apparent distress in bed  [X]  Call bell left within reach  [ ]  Nursing notified  [ ]  Caregiver present  [ ]  Bed alarm activated     Jamar Carroll MS OTR/L  Time Calculation: 24 mins

## 2017-01-12 NOTE — ROUTINE PROCESS
Bedside and Verbal shift change report given to Johnnie Cardona RN (oncoming nurse) by Naomi Rai RN   (offgoing nurse). Report included the following information SBAR, Kardex, OR Summary, Intake/Output, MAR and Recent Results.

## 2017-01-12 NOTE — PROGRESS NOTES
Pt has identified [facility] as her preferred facility and CM has asked [facility] to seek insurance auth from [employer]. Pt states she lives alone and has a motorized WC at home. 3:30pm; per [facility] Avila, they do not yet have insurance auth for pt to admit to their facility. CM informed pt and Dr. Hernando Michelle of the above.

## 2017-01-12 NOTE — DISCHARGE SUMMARY
[address] [place] [address]  TRANSFER SUMMARY    Name:  Russ Klein  MR#:  [id number]  :  1950  Account #:  [de-identified]  Date of Adm:  2017  Date of Transfer:  2017      DIAGNOSES AT DISCHARGE  1. Status post left shoulder replacement for a humerus fracture. 2. Hypoxia, resolved. 3. Morbid obesity. 4. Hypertension. 5. Non-insulin-dependent diabetes. 6. Nonalcoholic fatty liver disease. 7. Chronic immobility. 8. Prior knee replacements. 9. Fall at home. 10. Laceration, right lower extremity. 11. Urinary tract infection. CONSULTANTS: Dr. Shawanda Bloom, Pulmonology; Dr. Getachew Vergara, Cardiology; Dr. Irina Kimball, Orthopedic Surgery. HOSPITAL SUMMARY: The patient is 61years old. She is a  chronically debilitated lady who is mostly in a motorized wheelchair. She had a fall at home and was seen in the emergency room,  diagnosed with humerus fracture and recommendations were followup  outpatient. Subsequently, she came back in, apparently after another  fall. The fracture was displaced. At that time, it was recommended she  be admitted to the hospital, and she received treatment at that point  also for UTI and hypoxia. There was no clear reason for the hypoxia. She is chronically on Lovenox for a history of clotting disorder and  there was no evidence for notable respiratory symptomatology, and  thus she was given antibiotics for the UTI and seen in consultation by  Orthopedics who asked that we make her medically optimized for her  surgery, so we followed her LFTs, which are stable, also chronically  elevated, her last , . Her last hemoglobin A1c is 5.9%,  so her blood sugars are fairly well controlled. She is not insulin-  dependent. Her white count has been normal and stable, hemoglobin  and hematocrit stable pre and postop, last noted at 10.3 and 33.5. Her  last BUN and creatinine 15 and 0.62. She had a urine culture on  admission that showed an E. coli UTI.  She completed a course of  Levaquin for that for which it was sensitive to. We had Cardiology see  her preoperatively due to her debilitated condition and obesity for  evaluation of her cardiac status. She had an echo. There was no  evidence for notable cardiac issue and they recommended to proceed  with her surgery. She was also seen preoperatively by Pulmonology  because of her probable hypoventilation syndrome from obesity. She  also has had subsegmental atelectasis, which was causing some  oxygen requirements, but no evidence for acute infection; thus,  steroids were started, oxygen support, and she came through her  surgery well. Postoperatively, she went to the intensive care unit for  monitoring, but was able to wean off oxygen quickly down to nasal  cannula and now is completely off oxygen, so she is at 91% to 92% on  room air. She has no respiratory complaints. Her lungs are clear  anteriorly. Her shoulder is in a sling and dressed. The right lower  extremity has 5 sutures in place in a laceration and those can be  removed in 3 days from discharge. Today's vitals, blood pressure 120/41, pulse 93, temperature 97.6,  respiratory rate is 20. Her lungs are clear as noted. Cardiac exam  within normal limits. Abdomen is obese, soft, nontender. Her Dewitt  catheter is out. DISCHARGE MEDICATIONS  Discharge on the following medications, which include:  1. Colace 100 mg twice daily. 2. MiraLAX 17 g p.o. daily. 3. Prednisone 10 mg daily for 5 days. 4. Tramadol 50 mg  q.6 hours as needed for pain. 5. Albuterol 2 Puffs every 4 hours as needed. 6. BuSpar 10 mg twice daily. 7. Oyster shell calcium 1 tablet twice daily. 8. Claritin 10 mg daily. 9. Fish oil 1 cap daily. 10. Flaxseed oil 1 cap daily. 11. Flonase 2 sprays both nostrils daily. 12. Neurontin 300 mg twice daily and then 600 mg at bedtime. 13. Chondroitin and glucosamine 1 cap daily. 14. Norco 1 tab q.6 hours as needed for severe pain.   15. Lasix 20 mg daily. 16. Lovenox 60 mg subcutaneously daily. 17. Multivitamin 1 tablet daily. 18. Pravachol 40 mg at bedtime  19. Requip 2 mg 3 times daily, 1 mg in the evening, 2 mg at bedtime. 20. Actonel 150 mg every month. 21. Singulair 10 mg daily. 22. Symbicort 2 puffs twice daily. 23. Detrol 2 mg twice daily. 24. Trazodone 200 mg at night as needed for sleep. 25. Tylenol Arthritis 650 q.6 hours as needed for mild pain. 26. Zoloft 100 mg daily. INSTRUCTIONS: She should be on a diabetic diet. Dr. Alexandra Bowen wants  her to follow up with him after rehabilitation and/or in 2 weeks, and her  PCP is Dr. Erik Lopez, as she should see him after release from  discharge as well. She is stable for discharge to acute nursing facility  today for rehabilitation and strengthening. Do not remove the bandage  from the left shoulder until she sees Dr. Alexandra Bowen. Remove the sutures  from the right leg in 3 days, and continue PT and OT. TOTAL TIME: 45 minutes on discharge time. MD IVORY Slaughter / Yair Jauregui  D:  01/12/2017   12:33  T:  01/12/2017   13:06  Job #:  [id number]    1/13/17 1037    Addendum:  Patient was to be discharged yesterday. However, the accepting facility was unable to take her. No overnight events. Bed is ready at accepting facility. Please see discharge summary per Dr. Olga Momin on 1/12/17 for full details of hospitalization. Prescriptions were not signed yesterday. I have reprinted and signed them today. No changes to discharge araujo.     Jayashree Sevilla DO

## 2017-01-13 VITALS
HEIGHT: 63 IN | TEMPERATURE: 97.9 F | BODY MASS INDEX: 51.91 KG/M2 | HEART RATE: 64 BPM | OXYGEN SATURATION: 92 % | WEIGHT: 293 LBS | RESPIRATION RATE: 18 BRPM | DIASTOLIC BLOOD PRESSURE: 71 MMHG | SYSTOLIC BLOOD PRESSURE: 149 MMHG

## 2017-01-13 PROBLEM — E66.2 OBESITY HYPOVENTILATION SYNDROME (HCC): Chronic | Status: ACTIVE | Noted: 2017-01-13

## 2017-01-13 PROBLEM — M79.2 NEUROPATHIC PAIN: Chronic | Status: ACTIVE | Noted: 2017-01-13

## 2017-01-13 LAB
ALBUMIN SERPL BCP-MCNC: 2.1 G/DL (ref 3.4–5)
ALBUMIN/GLOB SERPL: 0.5 {RATIO} (ref 0.8–1.7)
ALP SERPL-CCNC: 160 U/L (ref 45–117)
ALT SERPL-CCNC: 85 U/L (ref 13–56)
ANION GAP BLD CALC-SCNC: 8 MMOL/L (ref 3–18)
AST SERPL W P-5'-P-CCNC: 53 U/L (ref 15–37)
BASOPHILS # BLD AUTO: 0 K/UL (ref 0–0.06)
BASOPHILS # BLD: 0 % (ref 0–2)
BILIRUB SERPL-MCNC: 0.6 MG/DL (ref 0.2–1)
BUN SERPL-MCNC: 15 MG/DL (ref 7–18)
BUN/CREAT SERPL: 21 (ref 12–20)
CALCIUM SERPL-MCNC: 8.4 MG/DL (ref 8.5–10.1)
CHLORIDE SERPL-SCNC: 106 MMOL/L (ref 100–108)
CO2 SERPL-SCNC: 30 MMOL/L (ref 21–32)
CREAT SERPL-MCNC: 0.71 MG/DL (ref 0.6–1.3)
DIFFERENTIAL METHOD BLD: ABNORMAL
EOSINOPHIL # BLD: 0 K/UL (ref 0–0.4)
EOSINOPHIL NFR BLD: 0 % (ref 0–5)
ERYTHROCYTE [DISTWIDTH] IN BLOOD BY AUTOMATED COUNT: 15 % (ref 11.6–14.5)
GLOBULIN SER CALC-MCNC: 4 G/DL (ref 2–4)
GLUCOSE BLD STRIP.AUTO-MCNC: 115 MG/DL (ref 70–110)
GLUCOSE BLD STRIP.AUTO-MCNC: 115 MG/DL (ref 70–110)
GLUCOSE BLD STRIP.AUTO-MCNC: 120 MG/DL (ref 70–110)
GLUCOSE SERPL-MCNC: 125 MG/DL (ref 74–99)
HCT VFR BLD AUTO: 34.9 % (ref 35–45)
HGB BLD-MCNC: 10.7 G/DL (ref 12–16)
LYMPHOCYTES # BLD AUTO: 24 % (ref 21–52)
LYMPHOCYTES # BLD: 1.6 K/UL (ref 0.9–3.6)
MCH RBC QN AUTO: 27.7 PG (ref 24–34)
MCHC RBC AUTO-ENTMCNC: 30.7 G/DL (ref 31–37)
MCV RBC AUTO: 90.4 FL (ref 74–97)
MONOCYTES # BLD: 0.8 K/UL (ref 0.05–1.2)
MONOCYTES NFR BLD AUTO: 11 % (ref 3–10)
NEUTS SEG # BLD: 4.4 K/UL (ref 1.8–8)
NEUTS SEG NFR BLD AUTO: 65 % (ref 40–73)
PLATELET # BLD AUTO: 360 K/UL (ref 135–420)
PMV BLD AUTO: 10.6 FL (ref 9.2–11.8)
POTASSIUM SERPL-SCNC: 4.5 MMOL/L (ref 3.5–5.5)
PROT SERPL-MCNC: 6.1 G/DL (ref 6.4–8.2)
RBC # BLD AUTO: 3.86 M/UL (ref 4.2–5.3)
SODIUM SERPL-SCNC: 144 MMOL/L (ref 136–145)
WBC # BLD AUTO: 6.8 K/UL (ref 4.6–13.2)

## 2017-01-13 PROCEDURE — 80053 COMPREHEN METABOLIC PANEL: CPT | Performed by: INTERNAL MEDICINE

## 2017-01-13 PROCEDURE — 97530 THERAPEUTIC ACTIVITIES: CPT

## 2017-01-13 PROCEDURE — 74011250636 HC RX REV CODE- 250/636: Performed by: HOSPITALIST

## 2017-01-13 PROCEDURE — 77010033678 HC OXYGEN DAILY

## 2017-01-13 PROCEDURE — 85025 COMPLETE CBC W/AUTO DIFF WBC: CPT | Performed by: INTERNAL MEDICINE

## 2017-01-13 PROCEDURE — 74011250637 HC RX REV CODE- 250/637: Performed by: FAMILY MEDICINE

## 2017-01-13 PROCEDURE — 82962 GLUCOSE BLOOD TEST: CPT

## 2017-01-13 PROCEDURE — 74011636637 HC RX REV CODE- 636/637: Performed by: INTERNAL MEDICINE

## 2017-01-13 PROCEDURE — 74011250637 HC RX REV CODE- 250/637: Performed by: HOSPITALIST

## 2017-01-13 PROCEDURE — 97116 GAIT TRAINING THERAPY: CPT

## 2017-01-13 PROCEDURE — 97535 SELF CARE MNGMENT TRAINING: CPT

## 2017-01-13 PROCEDURE — 74011250637 HC RX REV CODE- 250/637: Performed by: INTERNAL MEDICINE

## 2017-01-13 RX ORDER — DOCUSATE SODIUM 100 MG/1
100 CAPSULE, LIQUID FILLED ORAL 2 TIMES DAILY
Qty: 180 CAP | Refills: 0 | Status: SHIPPED | OUTPATIENT
Start: 2017-01-13 | End: 2017-04-13

## 2017-01-13 RX ORDER — HYDROCODONE BITARTRATE AND ACETAMINOPHEN 7.5; 325 MG/1; MG/1
1 TABLET ORAL
Qty: 20 TAB | Refills: 0 | Status: SHIPPED | OUTPATIENT
Start: 2017-01-13 | End: 2021-07-28

## 2017-01-13 RX ORDER — TRAMADOL HYDROCHLORIDE 50 MG/1
50 TABLET ORAL
Qty: 20 TAB | Refills: 0 | Status: SHIPPED | OUTPATIENT
Start: 2017-01-13 | End: 2021-07-28

## 2017-01-13 RX ADMIN — Medication 10 ML: at 01:14

## 2017-01-13 RX ADMIN — PREDNISONE 20 MG: 20 TABLET ORAL at 06:30

## 2017-01-13 RX ADMIN — FLUTICASONE PROPIONATE AND SALMETEROL 1 PUFF: 50; 250 POWDER RESPIRATORY (INHALATION) at 08:52

## 2017-01-13 RX ADMIN — FUROSEMIDE 40 MG: 40 TABLET ORAL at 08:53

## 2017-01-13 RX ADMIN — BUSPIRONE HYDROCHLORIDE 10 MG: 5 TABLET ORAL at 08:53

## 2017-01-13 RX ADMIN — FLUTICASONE PROPIONATE 2 SPRAY: 50 SPRAY, METERED NASAL at 08:52

## 2017-01-13 RX ADMIN — Medication 10 ML: at 01:16

## 2017-01-13 RX ADMIN — POTASSIUM CHLORIDE 20 MEQ: 20 TABLET, EXTENDED RELEASE ORAL at 08:53

## 2017-01-13 RX ADMIN — GUAIFENESIN 600 MG: 600 TABLET, EXTENDED RELEASE ORAL at 08:53

## 2017-01-13 RX ADMIN — ROPINIROLE 2 MG: 1 TABLET, FILM COATED ORAL at 01:13

## 2017-01-13 RX ADMIN — HYDROCODONE BITARTRATE AND ACETAMINOPHEN 1 TABLET: 7.5; 325 TABLET ORAL at 06:29

## 2017-01-13 RX ADMIN — PREDNISONE 10 MG: 10 TABLET ORAL at 12:20

## 2017-01-13 RX ADMIN — NYSTATIN: 100000 POWDER TOPICAL at 08:52

## 2017-01-13 RX ADMIN — LORATADINE 10 MG: 10 TABLET ORAL at 08:53

## 2017-01-13 RX ADMIN — HYDROCODONE BITARTRATE AND ACETAMINOPHEN 1 TABLET: 7.5; 325 TABLET ORAL at 12:20

## 2017-01-13 RX ADMIN — PANTOPRAZOLE SODIUM 40 MG: 40 TABLET, DELAYED RELEASE ORAL at 08:53

## 2017-01-13 RX ADMIN — SERTRALINE HYDROCHLORIDE 100 MG: 50 TABLET ORAL at 08:53

## 2017-01-13 RX ADMIN — TOLTERODINE TARTRATE 2 MG: 2 TABLET, FILM COATED ORAL at 08:53

## 2017-01-13 RX ADMIN — DOCUSATE SODIUM 100 MG: 100 CAPSULE, LIQUID FILLED ORAL at 08:53

## 2017-01-13 RX ADMIN — POLYETHYLENE GLYCOL 3350 17 G: 17 POWDER, FOR SOLUTION ORAL at 08:53

## 2017-01-13 RX ADMIN — ENOXAPARIN SODIUM 60 MG: 60 INJECTION SUBCUTANEOUS at 12:20

## 2017-01-13 NOTE — PROGRESS NOTES
1315- removed IJ. Layed pt supine as removing sutures and removing IJ. Pt instructed to lay flat for following thirty minutes. Pt VS monitored. Pt tolerated procedure well.

## 2017-01-13 NOTE — PROGRESS NOTES
Problem: Mobility Impaired (Adult and Pediatric)  Goal: *Acute Goals and Plan of Care (Insert Text)  Physical Therapy Goals  Initiated 1/5/2017 to be met within 2 days  STGs:  1. Rolling toward right with min/mod assist; Supine to/from supine with min assist in prep for ADL activity. 2. Tolerated sitting EOB 10+min for ADL activity. Physical Therapy Goals  Initiated 1/10/2017 and to be accomplished within 7 day(s)  Supine to sit to sit CGA/S with HR for positioning. Transfers: Sit to stand to chair CGA/S with LRAD for ADLs. Gait: Ambulate >100ft CGA/S with LRAD, no LOB, for home/community mobility. Stair Negotiation: Ascend/descend 6-inch box step min A/CGA with HR/HHA x 3 completions for home entry. Patient/Family Education: Independent with HEP and demonstrate competency with don/doff sling for follow-up care and safe discharge. Outcome: Progressing Towards Goal  PHYSICAL THERAPY TREATMENT     Patient: Jillian Espinoza (75 y.o. female)  Date: 1/13/2017  Diagnosis: fall  SHOULDER FRACTURE  Frequent falls Fracture of left humerus  Procedure(s) (LRB):  REVERSE LEFT TOTAL SHOULDER ARTHROPLASTY (Left) 4 Days Post-Op  Precautions: Fall   Chart, physical therapy assessment, plan of care and goals were reviewed. ASSESSMENT:  Pt with recent central line removal. Pt noting fear of falling, but able to amb to/from restroom without LOB. Increasing motivation and independence. Progression toward goals:  [ ]      Improving appropriately and progressing toward goals  [X]      Improving slowly and progressing toward goals  [ ]      Not making progress toward goals and plan of care will be adjusted       PLAN:  Patient continues to benefit from skilled intervention to address the above impairments. Continue treatment per established plan of care.   Discharge Recommendations:  [facility]  Further Equipment Recommendations for Discharge:  bedside commode, rolling walker        SUBJECTIVE:   Patient stated I'm leaving.       OBJECTIVE DATA SUMMARY:   Critical Behavior:  Neurologic State: Alert  Orientation Level: Oriented X4  Cognition: Appropriate decision making, Appropriate for age attention/concentration, Appropriate safety awareness, Follows commands  Safety/Judgement: Fall prevention  Functional Mobility Training:  Bed Mobility:  Rolling: Supervision  Supine to Sit: Supervision  Sit to Supine: Supervision  Transfers:  Sit to Stand: Supervision;Contact guard assistance  Stand to Sit: Supervision   Balance:  Sitting: Intact  Sitting - Static: Good (unsupported)  Sitting - Dynamic: Fair (occasional)  Standing: Impaired; With support  Standing - Static: Good  Standing - Dynamic : Fair  Ambulation/Gait Training:  Distance (ft): 30 Feet (ft)  Ambulation - Level of Assistance: Stand-by asssistance;Contact guard assistance  Gait Abnormalities: Decreased step clearance;Shuffling gait  Base of Support: Widened  Speed/Gemini: Slow  Step Length: Right shortened;Left shortened  Pain:  Pain Scale 1: Numeric (0 - 10)  Pain Intensity 1: 8  Pain Location 1: Arm  Pain Orientation 1: Left  Pain Intervention(s) 1: Medication (see MAR)  Activity Tolerance:   Fair  Please refer to the flowsheet for vital signs taken during this treatment.   After treatment:   [ ] Patient left in no apparent distress sitting up in chair  [X] Patient left in no apparent distress in bed  [X] Call bell left within reach  [X] Nursing notified  [ ] Caregiver present  [ ] Bed alarm activated      Diana Marks PTA   Time Calculation: 27 mins

## 2017-01-13 NOTE — ROUTINE PROCESS
TRANSFER - OUT REPORT:    Verbal report given to Wendi Sethi LPN(name) on Josh Blunt  being transferred to [address] [place] [address](unit) for routine progression of care       Report consisted of patients Situation, Background, Assessment and   Recommendations(SBAR). Information from the following report(s) SBAR, Kardex, Intake/Output, MAR and Recent Results was reviewed with the receiving nurse. Lines:       Opportunity for questions and clarification was provided.       Patient transported with:   [employer]

## 2017-01-13 NOTE — PROGRESS NOTES
Problem: Self Care Deficits Care Plan (Adult)  Goal: *Acute Goals and Plan of Care (Insert Text)  Occupational Therapy Goals  Initiated 1/6/2017 within 7 day(s). 1. Patient will perform grooming with supervision/set-up   2. Patient will perform upper body dressing with minimal assistance/contact guard assist.  3. Patient will perform lower body dressing with moderate assistance . 4. Patient will participate in UE exercises to improve UE ROM and strength increased independence with ADLs (RUE only)  5. Patient will perform toilet transfer with moderate assistance     Occupational Therapy Goals  Initiated 1/10/2017 within 7 day(s). 1. Patient will perform grooming with supervision/set-up   2. Patient will perform upper body dressing with minimal assistance/contact guard assist.  3. Patient will perform lower body dressing with moderate assistance . 4. Patient will participate in UE exercises to improve UE ROM and strength increased independence with ADLs  5. Patient will perform toilet transfer with supervision  6. Patient will perform all aspects of toileting with supervision   Outcome: Progressing Towards Goal  OCCUPATIONAL THERAPY TREATMENT     Patient: Kaiden Brown (77 y.o. female)  Date: 1/13/2017  Diagnosis: fall  SHOULDER FRACTURE  Frequent falls Fracture of left humerus  Procedure(s) (LRB):  REVERSE LEFT TOTAL SHOULDER ARTHROPLASTY (Left) 4 Days Post-Op  Precautions: Fall  Chart, occupational therapy assessment, plan of care, and goals were reviewed. ASSESSMENT: Patient supine in bed upon arrival for OT tx session. Patient required supervision to maneuver to EOB and CGA to stand from EOB. Patient required CGA to maneuver to toilet and CGA/addtional time to sit on toilet. Patient voided and had bowel movement; patient required max A to perform toileting hygiene; patient tolerated approx 5 mins of supported standing (with grab bar) while hygiene was completed.  Patient exhibited labored breathing but had no c/o of dizziness; attempted to take O2 sats but patient requested to sit. Patient required min A to sit down; O2 sats taken, registered at 94%. Patient required min A and additional time to stand from toilet and maneuver to sink. Patient guided to EOB and was able to lay supine from EOB with supervision. Patient's bandage (covering stitches) fell off lower right leg, Bisi, RN notified. Patient comfortable while supine in bed with all needs within reach. EDUCATION: Patient educated on safety and proper positioning during self care tasks; patient verbalized understanding. Progression toward goals:  [X]          Improving appropriately and progressing toward goals  [ ]          Improving slowly and progressing toward goals  [ ]          Not making progress toward goals and plan of care will be adjusted       PLAN:  Patient continues to benefit from skilled intervention to address the above impairments. Continue treatment per established plan of care. Discharge Recommendations:  Skilled Nursing Facility  Further Equipment Recommendations for Discharge:  bedside commode and N/A       SUBJECTIVE:   Patient stated I have to use the bathroom. \"      OBJECTIVE DATA SUMMARY:         Cognitive/Behavioral Status:  Neurologic State: Alert  Orientation Level: Appropriate for age, Oriented X4  Cognition: Appropriate decision making, Appropriate for age attention/concentration, Appropriate safety awareness, Follows commands  Safety/Judgement: Awareness of environment      Functional Mobility and Transfers for ADLs:              Bed Mobility:  Rolling: Supervision  Supine to Sit: Supervision  Sit to Supine: Supervision                  Transfers:  Sit to Stand: Contact guard assistance; Additional time              Toilet Transfer : Minimum assistance; Adaptive equipment; Additional time                Balance:  Sitting: Intact  Sitting - Static: Good (unsupported)  Sitting - Dynamic: Fair (occasional)  Standing: Impaired; Without support  Standing - Static: Good  Standing - Dynamic : Fair      ADL Intervention:  Toileting  Toileting Assistance: Moderate assistance  Bladder Hygiene: Minimum assistance  Bowel Hygiene: Maximum assistance  Clothing Management: Minimum assistance  Cues: Verbal cues provided  Adaptive Equipment: Grab bars     Cognitive Retraining  Safety/Judgement: Awareness of environment     Pain:  Pre Treatment: 6/10  Post Treatment: 6/10  Pain Scale 1: Numeric (0 - 10)  Pain Location 1: Arm  Pain Orientation 1: Left     Activity Tolerance:  Fair  Please refer to the flowsheet for vital signs taken during this treatment.   After treatment:   [ ]  Patient left in no apparent distress sitting up in chair  [X]  Patient left in no apparent distress in bed  [X]  Call bell left within reach  [X]  Nursing notified  [ ]  Caregiver present  [ ]  Bed alarm activated     Keyla Lozano, MS OTR/L  Time Calculation: 24 mins

## 2017-01-13 NOTE — PROGRESS NOTES
Cardiology Progress Note      1/13/2017 9:30 AM    Admit Date: 1/3/2017    Admit Diagnosis: fall  SHOULDER FRACTURE  Frequent falls      Subjective:     [place] Em denies chest pain, chest pressure/discomfort, dyspnea.     Visit Vitals    /74    Pulse (!) 51    Temp 98.6 °F (37 °C)    Resp 18    Ht 5' 3\" (1.6 m)    Wt 144.7 kg (319 lb)    SpO2 91%    BMI 56.51 kg/m2     Current Facility-Administered Medications   Medication Dose Route Frequency    docusate sodium (COLACE) capsule 100 mg  100 mg Oral BID    enoxaparin (LOVENOX) injection 60 mg  60 mg SubCUTAneous Q24H    sodium chloride (NS) flush 5-10 mL  5-10 mL IntraVENous Q8H    sodium chloride (NS) flush 5-10 mL  5-10 mL IntraVENous PRN    naloxone (NARCAN) injection 0.1 mg  0.1 mg IntraVENous PRN    diphenhydrAMINE (BENADRYL) injection 12.5 mg  12.5 mg IntraVENous Q6H PRN    sodium chloride (NS) flush 5-10 mL  5-10 mL IntraVENous Q8H    sodium chloride (NS) flush 5-10 mL  5-10 mL IntraVENous PRN    acetaminophen (TYLENOL) tablet 650 mg  650 mg Oral Q4H PRN    diphenhydrAMINE (BENADRYL) capsule 25 mg  25 mg Oral Q4H PRN    predniSONE (DELTASONE) tablet 10 mg  10 mg Oral PCL    predniSONE (DELTASONE) tablet 10 mg  10 mg Oral PCD    predniSONE (DELTASONE) tablet 20 mg  20 mg Oral QHS    [START ON 1/14/2017] predniSONE (DELTASONE) tablet 10 mg  10 mg Oral 12-DAY PREDNISONE DOSEPAK    [START ON 1/18/2017] predniSONE (DELTASONE) tablet 10 mg  10 mg Oral 12-DAY PREDNISONE DOSEPACK    polyethylene glycol (MIRALAX) packet 17 g  17 g Oral DAILY    sertraline (ZOLOFT) tablet 100 mg  100 mg Oral DAILY    busPIRone (BUSPAR) tablet 10 mg  10 mg Oral BID    pantoprazole (PROTONIX) tablet 40 mg  40 mg Oral DAILY    guaiFENesin SR (MUCINEX) tablet 600 mg  600 mg Oral Q12H    nystatin (MYCOSTATIN) 100,000 unit/gram powder   Topical BID    furosemide (LASIX) tablet 40 mg  40 mg Oral DAILY    rOPINIRole (REQUIP) tablet 2 mg  2 mg Oral QHS  insulin lispro (HUMALOG) injection   SubCUTAneous AC&HS    glucose chewable tablet 16 g  4 Tab Oral PRN    glucagon (GLUCAGEN) injection 1 mg  1 mg IntraMUSCular PRN    dextrose (D50W) injection syrg 12.5-25 g  25-50 mL IntraVENous PRN    gabapentin (NEURONTIN) capsule 300 mg  300 mg Oral QPM    gabapentin (NEURONTIN) capsule 600 mg  600 mg Oral QHS    rOPINIRole (REQUIP) tablet 1 mg  1 mg Oral QPM    traZODone (DESYREL) tablet 100 mg  100 mg Oral QHS PRN    potassium chloride (K-DUR, KLOR-CON) SR tablet 20 mEq  20 mEq Oral DAILY    diph,Pertuss(AC),Tet Vac-PF (BOOSTRIX) suspension 0.5 mL  0.5 mL IntraMUSCular PRIOR TO DISCHARGE    HYDROcodone-acetaminophen (NORCO) 7.5-325 mg per tablet 1 Tab  1 Tab Oral Q6H PRN    fluticasone-salmeterol (ADVAIR) 250mcg-50mcg/puff  1 Puff Inhalation BID    fluticasone (FLONASE) 50 mcg/actuation nasal spray 2 Spray  2 Spray Both Nostrils DAILY    tolterodine (DETROL) tablet 2 mg  2 mg Oral BID    loratadine (CLARITIN) tablet 10 mg  10 mg Oral DAILY    albuterol-ipratropium (DUO-NEB) 2.5 MG-0.5 MG/3 ML  3 mL Nebulization Q4H PRN         Objective:      Physical Exam:  Visit Vitals    /74    Pulse (!) 51    Temp 98.6 °F (37 °C)    Resp 18    Ht 5' 3\" (1.6 m)    Wt 144.7 kg (319 lb)    SpO2 91%    BMI 56.51 kg/m2     General Appearance:  Well developed, well nourished,alert and oriented x 3, and individual in no acute distress. Ears/Nose/Mouth/Throat:   Hearing grossly normal.         Neck: Supple. Chest:   Lungs clear to auscultation bilaterally. Cardiovascular:  Regular rate and rhythm, S1, S2 normal, no murmur. Abdomen:   Soft, non-tender, bowel sounds are active. Extremities: No edema bilaterally. Skin: Warm and dry.                Data Review:   Labs:    Recent Results (from the past 24 hour(s))   GLUCOSE, POC    Collection Time: 01/12/17 11:46 AM   Result Value Ref Range    Glucose (POC) 153 (H) 70 - 110 mg/dL   CBC WITH AUTOMATED DIFF Collection Time: 01/12/17  4:20 PM   Result Value Ref Range    WBC 9.6 4.6 - 13.2 K/uL    RBC 3.92 (L) 4.20 - 5.30 M/uL    HGB 11.0 (L) 12.0 - 16.0 g/dL    HCT 35.4 35.0 - 45.0 %    MCV 90.3 74.0 - 97.0 FL    MCH 28.1 24.0 - 34.0 PG    MCHC 31.1 31.0 - 37.0 g/dL    RDW 15.0 (H) 11.6 - 14.5 %    PLATELET 585 313 - 170 K/uL    MPV 10.9 9.2 - 11.8 FL    NEUTROPHILS 61 40 - 73 %    LYMPHOCYTES 26 21 - 52 %    MONOCYTES 13 (H) 3 - 10 %    EOSINOPHILS 0 0 - 5 %    BASOPHILS 0 0 - 2 %    ABS. NEUTROPHILS 5.8 1.8 - 8.0 K/UL    ABS. LYMPHOCYTES 2.5 0.9 - 3.6 K/UL    ABS. MONOCYTES 1.2 0.05 - 1.2 K/UL    ABS. EOSINOPHILS 0.0 0.0 - 0.4 K/UL    ABS. BASOPHILS 0.0 0.0 - 0.06 K/UL    DF AUTOMATED     METABOLIC PANEL, COMPREHENSIVE    Collection Time: 01/12/17  4:20 PM   Result Value Ref Range    Sodium 140 136 - 145 mmol/L    Potassium 4.1 3.5 - 5.5 mmol/L    Chloride 103 100 - 108 mmol/L    CO2 29 21 - 32 mmol/L    Anion gap 8 3.0 - 18 mmol/L    Glucose 133 (H) 74 - 99 mg/dL    BUN 17 7.0 - 18 MG/DL    Creatinine 0.71 0.6 - 1.3 MG/DL    BUN/Creatinine ratio 24 (H) 12 - 20      GFR est AA >60 >60 ml/min/1.73m2    GFR est non-AA >60 >60 ml/min/1.73m2    Calcium 8.5 8.5 - 10.1 MG/DL    Bilirubin, total 0.6 0.2 - 1.0 MG/DL    ALT 96 (H) 13 - 56 U/L    AST 65 (H) 15 - 37 U/L    Alk.  phosphatase 171 (H) 45 - 117 U/L    Protein, total 6.3 (L) 6.4 - 8.2 g/dL    Albumin 2.2 (L) 3.4 - 5.0 g/dL    Globulin 4.1 (H) 2.0 - 4.0 g/dL    A-G Ratio 0.5 (L) 0.8 - 1.7     GLUCOSE, POC    Collection Time: 01/12/17  4:35 PM   Result Value Ref Range    Glucose (POC) 119 (H) 70 - 110 mg/dL   GLUCOSE, POC    Collection Time: 01/12/17  9:21 PM   Result Value Ref Range    Glucose (POC) 119 (H) 70 - 110 mg/dL   CBC WITH AUTOMATED DIFF    Collection Time: 01/13/17  3:26 AM   Result Value Ref Range    WBC 6.8 4.6 - 13.2 K/uL    RBC 3.86 (L) 4.20 - 5.30 M/uL    HGB 10.7 (L) 12.0 - 16.0 g/dL    HCT 34.9 (L) 35.0 - 45.0 %    MCV 90.4 74.0 - 97.0 FL    MCH 27.7 24.0 - 34.0 PG    MCHC 30.7 (L) 31.0 - 37.0 g/dL    RDW 15.0 (H) 11.6 - 14.5 %    PLATELET 677 348 - 617 K/uL    MPV 10.6 9.2 - 11.8 FL    NEUTROPHILS 65 40 - 73 %    LYMPHOCYTES 24 21 - 52 %    MONOCYTES 11 (H) 3 - 10 %    EOSINOPHILS 0 0 - 5 %    BASOPHILS 0 0 - 2 %    ABS. NEUTROPHILS 4.4 1.8 - 8.0 K/UL    ABS. LYMPHOCYTES 1.6 0.9 - 3.6 K/UL    ABS. MONOCYTES 0.8 0.05 - 1.2 K/UL    ABS. EOSINOPHILS 0.0 0.0 - 0.4 K/UL    ABS. BASOPHILS 0.0 0.0 - 0.06 K/UL    DF AUTOMATED     METABOLIC PANEL, COMPREHENSIVE    Collection Time: 01/13/17  3:26 AM   Result Value Ref Range    Sodium 144 136 - 145 mmol/L    Potassium 4.5 3.5 - 5.5 mmol/L    Chloride 106 100 - 108 mmol/L    CO2 30 21 - 32 mmol/L    Anion gap 8 3.0 - 18 mmol/L    Glucose 125 (H) 74 - 99 mg/dL    BUN 15 7.0 - 18 MG/DL    Creatinine 0.71 0.6 - 1.3 MG/DL    BUN/Creatinine ratio 21 (H) 12 - 20      GFR est AA >60 >60 ml/min/1.73m2    GFR est non-AA >60 >60 ml/min/1.73m2    Calcium 8.4 (L) 8.5 - 10.1 MG/DL    Bilirubin, total 0.6 0.2 - 1.0 MG/DL    ALT 85 (H) 13 - 56 U/L    AST 53 (H) 15 - 37 U/L    Alk. phosphatase 160 (H) 45 - 117 U/L    Protein, total 6.1 (L) 6.4 - 8.2 g/dL    Albumin 2.1 (L) 3.4 - 5.0 g/dL    Globulin 4.0 2.0 - 4.0 g/dL    A-G Ratio 0.5 (L) 0.8 - 1.7     GLUCOSE, POC    Collection Time: 01/13/17  8:02 AM   Result Value Ref Range    Glucose (POC) 115 (H) 70 - 110 mg/dL       Telemetry: normal sinus rhythm      Assessment:     Principal Problem:    Fracture of left humerus (1/4/2017)    Active Problems: Morbid obesity with BMI of 50.0-59.9, adult ([facility] [address] (9/21/2016)      Frequent falls (1/4/2017)      UTI (urinary tract infection) (1/4/2017)      Elevated LFTs (1/4/2017)      Hypoxia (1/4/2017)      Immobility (1/4/2017)      Obesity hypoventilation syndrome ([facility] [address] (1/13/2017)        Plan:     Cardiac status is stable. Rehab pending.      Mirella Emmanuel MD

## 2017-01-13 NOTE — PROGRESS NOTES
NUTRITION FOLLOW-UP    RECOMMENDATIONS/PLAN:   - Decrease Glucerna shakes to BID due to improving PO intake (~50% of meals)  - Continue Consistent Carb diet    NUTRITION ASSESSMENT:   Client Update: 61 yrs old Female admitted with humerus fx s/p shoulder surgery this adm, also with with atelectasis and chronic hypoxia due to hypoventilation, UTI, fatty liver. Current Hba1c meets criteria for pre-diabetes. Pt is immobile and morbidly obese, anticipating d/c to rehab. FOOD/NUTRITION INTAKE   Diet Order:  Consistent Carb Soft Solid   Supplements: Glucerna Shakes TID   Food Allergies: NKFA  Average PO Intake:    ~53%  Patient Vitals for the past 100 hrs:   % Diet Eaten   01/12/17 1806 40 %   01/12/17 0925 100 %   01/10/17 1234 30 %   01/10/17 1043 40 %   Pertinent Medications:  [x] Reviewed; Insulin:  [x] SSI     [] Pre-meal:      []  Basal:     [] None   Cultural/Orthodox Food Preferences: None Identified    BIOCHEMICAL DATA & MEDICAL TESTS  Pertinent Labs:  [x] Reviewed; POC -153, HbA1c 5.9, , Alb 2.1   ANTHROPOMETRICS  Height: 5' 3\" (160 cm)       Weight: 144.7 kg (319 lb)         BMI: 55 kg/m^2 morbidly obese (Greater than or = to 40% BMI)   Adm Weight: 307 lbs                Weight change: +12 lbs (?edema)  Adjusted Body Weight: 74 kg     NUTRITION-FOCUSED PHYSICAL ASSESSMENT  Skin: intact aside from shoulder incision      GI: 1/13 last BM    NUTRITION PRESCRIPTION  Calories: 6693-2608 kcal/day based on MSJ x1.2 -500  Protein: 74-96 g/day based on 1-1.3 g/kg AdjBW  CHO: 225 g/day based on 50% of total energy  Fluid: 3252-8348 ml/day based on 1 kcal/ml      NUTRITION DIAGNOSES:   1. Overweight/obesity related to h/o excessive energy intake and inadequate physical activity as evidenced by BMI 55 kg/m^2 and 267% IBW. - ongoing  2. At risk for inadequate oral intake related to hypoventilation as evidenced by PO intake <50% of most meals since adm.   - progressing    NUTRITION INTERVENTIONS: INTERVENTIONS:        GOALS:  1. Decrease ONS to BID, continue current diet 1. >50% PO intake meals/supplements, maintain skin integrity by next review 3-5 days     LEARNING NEEDS (Diet, Supplementation, Food/Nutrient-Drug Interaction):   [x] None Identified     [] Education provided & documented        Identified and patient:   [] Declined      [] Was not appropriate/indicated        NUTRITION MONITORING /EVALUATION:   Follow PO intake  Monitor wt  Monitor for additional supplement needs    Previous Recommendations Implemented: yes        Previous Goals Met:  yes       [] Participated in Interdisciplinary Rounds    [x] Interdisciplinary Care Plan Reviewed  [x] Discharge Nutrition Plan:  Consistent cho    NUTRITION RISK:           [x] At risk                        [] Not currently at risk        Will follow-up per policy.   Silvia Gao, QI  PAGER:  [phone]

## 2017-01-13 NOTE — PROGRESS NOTES
Hospitalist Progress Note    Patient: Saúl Ignacio MRN: [id number]  CSN: [id number]    YOB: 1950  Age: 61 y.o. Sex: female    DOA: 1/3/2017 LOS:  LOS: 9 days          Chief Complaint:    Fall, Left Humerus Fracture      Assessment/Plan     Patient was to be discharged yesterday. However, the accepting facility was unable to take her. No overnight events. Bed is ready at accepting facility. Please see discharge summary per Dr. Kwabena Madison on 1/12/17 for full details of hospitalization. Prescriptions were not signed yesterday. I have reprinted and signed them today. No changes to discharge pan. Patient Active Problem List   Diagnosis Code    S/P gastric bypass Z98.84    NAFLD (nonalcoholic fatty liver disease) K76.0    Morbid obesity with BMI of 50.0-59.9, adult ([facility] [address] E66.01, Z68.43    Frequent falls R29.6    UTI (urinary tract infection) N39.0    Elevated LFTs R79.89    Fracture of left humerus S42.302A    Hypoxia R09.02    Immobility Z74.09    Obesity hypoventilation syndrome (HCC) E66.2       Subjective:    No acute events overnight. No complaints. Review of systems:    Constitutional: denies fevers, chills, myalgias  Respiratory: denies SOB, cough  Cardiovascular: denies chest pain, palpitations  Gastrointestinal: denies nausea, vomiting, diarrhea      Vital signs/Intake and Output:  Visit Vitals    /74    Pulse (!) 51    Temp 98.6 °F (37 °C)    Resp 18    Ht 5' 3\" (1.6 m)    Wt 144.7 kg (319 lb)    SpO2 91%    BMI 56.51 kg/m2     Current Shift:     Last three shifts:  01/11 1901 - 01/13 0700  In: 6957 [P.O.:600;  I.V.:950]  Out: 2775 [Urine:2775]    Exam:    General: Well developed, alert, NAD, OX3  Head/Neck: NCAT, supple, No masses, No lymphadenopathy  CVS:Regular rate and rhythm, no M/R/G, S1/S2 heard, no thrill  Lungs:Clear to auscultation bilaterally, no wheezes, rhonchi, or rales  Abdomen: Soft, Nontender, No distention, Normal Bowel sounds, No hepatomegaly  Extremities: No C/C; trace B/L LE edema  Skin:normal texture and turgor, no rashes, no lesions  Neuro:grossly normal , follows commands  Psych:appropriate                Labs: Results:       Chemistry Recent Labs      01/13/17 0326 01/12/17 1620 01/11/17   0440   GLU  125*  133*  120*   NA  144  140  138   K  4.5  4.1  4.5   CL  106  103  102   CO2  30  29  32   BUN  15  17  15   CREA  0.71  0.71  0.62   CA  8.4*  8.5  8.0*   AGAP  8  8  4   BUCR  21*  24*  24*   AP  160*  171*  210*   TP  6.1*  6.3*  6.0*   ALB  2.1*  2.2*  1.9*   GLOB  4.0  4.1*  4.1*   AGRAT  0.5*  0.5*  0.5*      CBC w/Diff Recent Labs      01/13/17 0326 01/12/17 1620 01/11/17   0440   WBC  6.8  9.6  7.1   RBC  3.86*  3.92*  3.63*   HGB  10.7*  11.0*  10.3*   HCT  34.9*  35.4  33.5*   PLT  360  360  271   GRANS  65  61  76*   LYMPH  24  26  13*   EOS  0  0  0      Cardiac Enzymes No results for input(s): CPK, CKND1, YESSENIA in the last 72 hours. No lab exists for component: CKRMB, TROIP   Coagulation No results for input(s): PTP, INR, APTT in the last 72 hours. No lab exists for component: INREXT    Lipid Panel No results found for: CHOL, CHOLPOCT, CHOLX, CHLST, CHOLV, [id number], HDL, LDL, NLDLCT, DLDL, LDLC, DLDLP, [id number], VLDLC, VLDL, TGL, TGLX, TRIGL, [id number], TRIGP, TGLPOCT, [id number], CHHD, CHHDX   BNP No results for input(s): BNPP in the last 72 hours.    Liver Enzymes Recent Labs      01/13/17 0326   TP  6.1*   ALB  2.1*   AP  160*   SGOT  53*      Thyroid Studies No results found for: T4, T3U, TSH, TSHEXT     Procedures/imaging: see electronic medical records for all procedures/Xrays and details which were not copied into this note but were reviewed prior to creation of Lisaa [id number], DO

## 2017-01-13 NOTE — PROGRESS NOTES
CM met with Adilson Retana of [facility], [address] [facility], [facility] [address], and she confirms that she has received insurance auth for pt to admit to her facility today. Pt is scheduled for next available stretcher transport time of 2pm. Nurse to call report to:  [phone]. Nurse please send [facility] rec, DC summary and any scripts for controlled substances with pt. CM reviewed the above discharge plan with pt and she is in agreement with plan. Care Management Interventions  PCP Verified by CM:  Yes  Transition of Care Consult (CM Consult): Discharge Planning, SNF  MyChart Signup: No  Discharge Durable Medical Equipment: No  Health Maintenance Reviewed: Yes  Physical Therapy Consult: Yes  Occupational Therapy Consult: Yes  Speech Therapy Consult: No  Current Support Network: Own Home, Lives Alone  Confirm Follow Up Transport: Other (see comment) (stretcher)  Plan discussed with Pt/Family/Caregiver: Yes  Freedom of Choice Offered: Yes  Discharge Location  Discharge Placement: Skilled nursing facility

## 2017-01-13 NOTE — PROGRESS NOTES
1950 Pt received from offgoing nurse without any signs or symptoms of distress. Pt vitals are stable and within normal limits. Pt bed in low position with wheels locked and call bell within reach. 2012 Assessment completed and documented in flow sheet. Pt denies any further needs at this time. Pt in NAD with bed in low position, wheels locked and call bell within reach. 2233 Pain medication administered as per PRN order for c/o pain. See flow sheets for follow up documentation. 0630 Pain medication administered as per PRN order for c/o pain. See flow sheets for follow up documentation. 2317 Shift summary: Patient spent uneventful shift. No issues noted. See flow sheet and MAR.  0730 Bedside and Verbal shift change report given to [address] [place] [address],[address] (oncoming nurse) by Tommy Darden RN   (offgoing nurse). Report included the following information SBAR, Intake/Output and MAR.

## 2017-03-27 ENCOUNTER — HOSPITAL ENCOUNTER (OUTPATIENT)
Dept: PHYSICAL THERAPY | Age: 67
Discharge: HOME | End: 2017-03-27
Payer: MEDICARE

## 2017-03-27 DIAGNOSIS — Z96.612 S/P SHOULDER REPLACEMENT, LEFT: ICD-10-CM

## 2017-03-27 PROCEDURE — 97162 PT EVAL MOD COMPLEX 30 MIN: CPT

## 2017-03-27 PROCEDURE — 97110 THERAPEUTIC EXERCISES: CPT

## 2017-03-27 PROCEDURE — G8985 CARRY GOAL STATUS: HCPCS

## 2017-03-27 NOTE — PROGRESS NOTES
[facility] in [address] ISELA Hernandez [place], [facility] [address]  Phone: [phone]      Fax:  [phone] of Care/ Statement of Necessity for Physical Therapy Services    Patient name: John Arias Start of Care: 3/27/2017   Referral source: Nuris Ho MD : 1950    Medical Diagnosis: Left shoulder pain [M25.512]   Onset Date:1/3/2017    Treatment Diagnosis: s/p Left shoulder replacement    Prior Hospitalization: see medical history Provider#: [id number]   Medications: Verified on Patient summary List    Comorbidities: BTKR, Spinal Stenosis, depression, osteoporosis, DM, arthritis, asthma, Hx CA   Prior Level of Function: Pt notes she lives alone and has been in motorized w/c for 12 years, but able to take care of herself      The Plan of Care and following information is based on the information from the initial evaluation. Assessment/ key information: Pt is a 61 y.o. female who fell trying to get into bed on 1/3/2017. Pt had shoulder replacement on 2017 followed by Inpatient Rehab, then Home Health PT. Pt was d/c from home health on 3/24/17. Pt presents with significant Left shoulder strength deficits and both AROM and PROM limitations. Pt presents with forward head, increased kyphosis, and left shoulder depression that is significant. Pt is able to self correct with verbal cues but unable to maintain posture. Pt will benefit from PT to increase Left UE strength and ROM, postural ed., est HEP, and manual therapy to increase ROM. Evaluation Complexity History HIGH Complexity :3+ comorbidities / personal factors will impact the outcome/ POC ; Examination HIGH Complexity : 4+ Standardized tests and measures addressing body structure, function, activity limitation and / or participation in recreation  ;Presentation MEDIUM Complexity : Evolving with changing characteristics  ; Clinical Decision Making MEDIUM Complexity : FOTO score of 26-74  Overall Complexity Rating: MEDIUM  Problem List: pain affecting function, decrease ROM, decrease strength, decrease ADL/ functional abilitiies and decrease flexibility/ joint mobility   Treatment Plan may include any combination of the following: Therapeutic exercise, Therapeutic activities, Manual therapy, Patient education, Self Care training, Functional mobility training and Home safety training  Patient / Family readiness to learn indicated by: asking questions, trying to perform skills and interest  Persons(s) to be included in education: patient (P)  Barriers to Learning/Limitations: None  Patient Goal (s): Be able to put on a jacket.   Patient Self Reported Health Status: good  Rehabilitation Potential: good    Short Term Goals: To be accomplished in 2 weeks:  1- Pt will be able to AROM flex to 100 in order to improve ADLs. Status at eval: AROM 45  2-Pt will be independent with HEP to perform daily without increased pain. Status at eval: HEP initiated  3- Pt will increase strength to left shoulder flex/abd to >/= 3+/5 in order to reach without compensation. Status at eval: 3-/5    Long Term Goals: To be accomplished in 4 weeks:  1- Pt will increase strength to Left UE to 4-/5 or greater in order to increase independence with ADLs/  Status at eval: 3-/5  2- Pt will be able to put on a jacket independently and without compensation. Status at eval: 0%  3- Pt will increase FOTO to >/=66/100 to improve functional status. Status at eval: 49/100  Frequency / Duration: Patient to be seen 3 times per week for 4 weeks. Patient/ Caregiver education and instruction: Diagnosis, prognosis, self care, exercises and other postural education   [x]  Plan of care has been reviewed with WILLIAM    G-Rhina (GP)  Mobility  Position   Current= CK   Goal= CJ      The severity rating is based on clinical judgment and the FOTO score.     Certification Period: 3/27/2017- 4/26/2017  Annabel Olszewski, PT 3/27/2017 4:35 PM  _____________________________________________________________________  I certify that the above Therapy Services are being furnished while the patient is under my care. I agree with the treatment plan and certify that this therapy is necessary. Physician's Signature:____________________  Date:__________Time:______    Please sign and return to   [facility] in [address]  [place] [address]     Phone: [phone]      Fax:  [phone]

## 2017-03-27 NOTE — PROGRESS NOTES
PT DAILY TREATMENT NOTE - [facility]     Patient Name: Yecenia Roblero  Date:3/27/2017  : 1950  [x]  Patient  Verified  [place] / Plan: [place] MED[place]ARE PART A & B / Product Type: Medicare /    In time:  Out time:161  Total Treatment Time (min): 1615  Total Timed Codes (min): 45  1:1 Treatment Time ( [address] only): 39   Visit #: 1 of 12    Treatment Area: Left shoulder pain [M25.512]    SUBJECTIVE  Pain Level (0-10 scale): 1-2/10  Any medication changes, allergies to medications, adverse drug reactions, diagnosis change, or new procedure performed?: [x] No    [] Yes (see summary sheet for update)  Subjective functional status/changes:   [] No changes reported  Pt presented for initial evaluation.   Shoulder replacement 2017  OBJECTIVE    Modality rationale:  to improve the patients ability to    Min Type Additional Details    [] Estim:  []Unatt       []IFC  []Premod                        []Other:  []w/ice   []w/heat  Position:  Location:    [] Estim: []Att    []TENS instruct  []NMES                    []Other:  []w/US   []w/ice   []w/heat  Position:  Location:    []  Traction: [] Cervical       []Lumbar                       [] Prone          []Supine                       []Intermittent   []Continuous Lbs:  [] before manual  [] after manual    []  Ultrasound: []Continuous   [] Pulsed                           []1MHz   []3MHz W/cm2:  Location:    []  Iontophoresis with dexamethasone         Location: [] Take home patch   [] In clinic    []  Ice     []  heat  []  Ice massage  []  Laser   []  Anodyne Position:  Location:    []  Laser with stim  []  Other:  Position:  Location:    []  Vasopneumatic Device Pressure:       [] lo [] med [] hi   Temperature: [] lo [] med [] hi   [] Skin assessment post-treatment:  []intact []redness- no adverse reaction    []redness - adverse reaction:      min [x]Eval                  []Re-Eval       25 min Therapeutic Exercise:  [x] See flow sheet :   Rationale: increase ROM and increase strength to improve the patients ability to put jacket on     min Therapeutic Activity:  []  See flow sheet :   Rationale:   to improve the patients ability to       min Neuromuscular Re-education:  []  See flow sheet :   Rationale:   to improve the patients ability to      min Manual Therapy:     Rationale:  to      min Gait Training:  ___ feet with ___ device on level surfaces with ___ level of assist   Rationale: With   [] TE   [] TA   [] neuro   [] other: Patient Education: [x] Review HEP    [] Progressed/Changed HEP based on:   [] positioning   [] body mechanics   [] transfers   [] heat/ice application    [] other:      Other Objective/Functional Measures: Please see POC and evaluation     Pain Level (0-10 scale) post treatment: 0/10    ASSESSMENT/Changes in Function: Please see POC  Patient will continue to benefit from skilled PT services to address functional mobility deficits, address ROM deficits and address strength deficits to attain remaining goals.      [x]  See Plan of Care  []  See progress note/recertification  []  See Discharge Summary         Progress towards goals / Updated goals:  Please see POC    PLAN  []  Upgrade activities as tolerated     []  Continue plan of care  []  Update interventions per flow sheet       []  Discharge due to:_  [x]  Other:Initial evaluation     Christophe Frausto PT 3/27/2017  4:29 PM    Future Appointments  Date Time Provider Kiki Carcamo   4/3/2017 11:00 AM WILLIAM Rodriguez [facility]   4/4/2017 10:00 AM WILLIAM Rodriguez [facility]   4/7/2017 11:00 AM WILLIAM Rodriguez THE [facility]   4/10/2017 2:00 PM Christine Garrett, PT [facility]   4/11/2017 1:30 PM ROWENA Scott THE [facility]   4/14/2017 10:30 AM ROWENA Hatch [facility]   4/17/2017 2:00 PM ROWENA Scott THE [facility]   4/18/2017 1:00 PM WILLIAM Rodriguez [facility]   4/21/2017 10:30 AM ROWENA Scott [facility]   4/24/2017 2:00 PM ROWENA Hatch [facility]   4/25/2017 1:30 PM Christine Tilda Bolus, PT [facility]   4/28/2017 10:30 AM Christine Tilda Bolus, PT [facility]

## 2017-03-27 NOTE — PROGRESS NOTES
Physical Therapy Evaluation - Shoulder    Posture: [x] Poor    [] Fair    [] Good    Describe: Pt presents in motorized w/c with slight forward head, significant left shoulder and scapular depression, and thoracic kyphosis. Pt is able to self correct forward head and mild scapular retraction with verbal cues but unable to maintain. ROM:  [] Unable to assess at this time                                           AROM                                                              PROM   Left Right  Left Right   Flexion 45  Flexion 142    Extension wnl  Extension wnl    Scaption/ABD 35  Scaption/ABD 61    ER @ 0 Degrees   ER @ 0 Degrees 10    ER @ 90 Degrees   ER @ 90 Degrees unable    IR @ 90 Degrees   IR @ 90 Degrees wfl      End Feel / Painful Arc: negative    Strength:   [] Unable to assess at this time                                                                            L (1-5) R (1-5) Pain   Flexors 3-/5  [x] Yes   [] No   Abductors 3-/5  [x] Yes   [] No   External Rotators 3-/5  [x] Yes   [] No   Internal Rotators 3/5  [x] Yes   [] No   Supraspinatus 3-/5  [x] Yes   [] No   Serratus Anterior   [] Yes   [] No   Lower Trapezius   [] Yes   [] No   Elbow Flexion 3+/5  [] Yes   [x] No   Elbow Extension 3+/5  [] Yes   [x] No       Scapulohumoral Control / Rhythm:  Able to eccentrically lower with good control? Left: [] Yes   [x] No     Right: [] Yes   [] No    Accessory Motions: Pt compensates with significant right lateral lean to obtain shoulder flex and abduction. Palpation  No pain to palpation    Optional Tests:    Sensation Left Right Reflexes Left Right   Biceps (C5)   Biceps (C5)     Servin Radial(C6-7)   Brachioradialis (C6)     Servin Ulnar(C8-T1)   Triceps (C7)         Other Tests / Comments: Pt utilizes motorized w/c but is able to trs from chair mat independently and without difficulty.   Pt notes she utilized motorized w/c in the home but when asked if she could walk to machines in the gym, pt noted she was unable to do so.

## 2017-04-03 ENCOUNTER — HOSPITAL ENCOUNTER (OUTPATIENT)
Dept: PHYSICAL THERAPY | Age: 67
Discharge: HOME | End: 2017-04-03
Payer: MEDICARE

## 2017-04-03 PROCEDURE — 97110 THERAPEUTIC EXERCISES: CPT

## 2017-04-03 NOTE — PROGRESS NOTES
PT DAILY TREATMENT NOTE - [facility]     Patient Name: Olga Barclay  Date:4/3/2017  : 1950  [x]  Patient  Verified  Payor: Blanca Zavala / [place]: [place] [address] [place] [address] / Product Type: Managed Care Medicare /    In time:1045  Out time:1130  Total Treatment Time (min): 45  Total Timed Codes (min): 45  1:1 Treatment Time ( [address] only): 39   Visit #: 2 of 12    Treatment Area: Left shoulder pain [M25.512]    SUBJECTIVE  Pain Level (0-10 scale): 3-4  Any medication changes, allergies to medications, adverse drug reactions, diagnosis change, or new procedure performed?: [x] No    [] Yes (see summary sheet for update)  Subjective functional status/changes:     [x] No changes reported    OBJECTIVE    45 min Therapeutic Exercise:  [] See flow sheet :   Rationale: increase ROM, increase strength and improve coordination            With   [] TE   [] TA   [] neuro   [] other: Patient Education: [x] Review HEP    [] Progressed/Changed HEP based on:   [] positioning   [] body mechanics   [] transfers   [] heat/ice application    [] other:      Other Objective/Functional Measures:   none     Pain Level (0-10 scale) post treatment:3-4/10    ASSESSMENT/Changes in Function:   Good tolerance to TE with no adverse effects    Patient will continue to benefit from skilled PT services to modify and progress therapeutic interventions, address functional mobility deficits, address ROM deficits and address strength deficits to attain remaining goals. [x]  See Plan of Care  []  See progress note/recertification  []  See Discharge Summary         Progress towards goals / Updated goals:  Short Term Goals: To be accomplished in 2 weeks:  1- Pt will be able to AROM flex to 100 in order to improve ADLs. Status at eval: AROM 45  2-Pt will be independent with HEP to perform daily without increased pain.   Status at eval: HEP initiated  3- Pt will increase strength to left shoulder flex/abd to >/= 3+/5 in order to reach without compensation. Status at eval: 3-/5     Long Term Goals: To be accomplished in 4 weeks:  1- Pt will increase strength to Left UE to 4-/5 or greater in order to increase independence with ADLs/  Status at eval: 3-/5  2- Pt will be able to put on a jacket independently and without compensation. Status at eval: 0%  3- Pt will increase FOTO to >/=66/100 to improve functional status. Status at eval: 49/100  Frequency / Duration: Patient to be seen 3 times per week for 4 weeks.     PLAN  [x]  Upgrade activities as tolerated     [x]  Continue plan of care  []  Update interventions per flow sheet       []  Discharge due to:_  []  Other:_      Kaleb Rodriguez PTA 4/3/2017  11:37 AM    Future Appointments  Date Time Provider Kiki Carcamo   4/4/2017 10:00 AM Kaleb Rodriguez PTA [facility]   4/7/2017 11:00 AM Kerry Spangler PT [facility]   4/10/2017 2:00 PM Kerry Spangler PT [facility]   4/11/2017 1:30 PM Kerry Spangler PT [facility]   4/14/2017 10:30 AM Christine Patel PT [facility]   4/17/2017 1:30 PM Christine Patel PT [facility]   4/18/2017 1:00 PM Kerry Spangler PT [facility]   4/21/2017 10:30 AM Ada Lechuga PT [facility]   4/24/2017 2:00 PM Kerry Spangler PT [facility]   4/25/2017 1:30 PM Christine Patel PT [facility]   4/28/2017 10:30 AM Christine Patel PT [facility]

## 2017-04-04 ENCOUNTER — HOSPITAL ENCOUNTER (OUTPATIENT)
Dept: PHYSICAL THERAPY | Age: 67
Discharge: HOME | End: 2017-04-04
Payer: MEDICARE

## 2017-04-04 PROCEDURE — 97110 THERAPEUTIC EXERCISES: CPT

## 2017-04-04 PROCEDURE — 97140 MANUAL THERAPY 1/> REGIONS: CPT

## 2017-04-04 NOTE — PROGRESS NOTES
PT DAILY TREATMENT NOTE - [facility]     Patient Name: Ngozi Nice  Date:2017  : 1950  [x]  Patient  Verified  Payor: Thuy Cha / Plan: [facility] / Product Type: Managed Care Medicare /    In time:10  Out time:1040  Total Treatment Time (min): 40  Total Timed Codes (min): 40  1:1 Treatment Time ([address] only): 40   Visit #: 3 of 12    Treatment Area: Left shoulder pain [M25.512]    SUBJECTIVE  Pain Level (0-10 scale): 0  Any medication changes, allergies to medications, adverse drug reactions, diagnosis change, or new procedure performed?: [x] No    [] Yes (see summary sheet for update)  Subjective functional status/changes:   [] No changes reported  Reports decrease in pain since last session. OBJECTIVE      32 min Therapeutic Exercise:  [] See flow sheet :   Rationale: increase ROM and increase strength       8 min Manual Therapy:  Prom to L shoulder into flex and abd in supine   Rationale: decrease pain, increase ROM and increase tissue extensibility           With   [] TE   [] TA   [] neuro   [] other: Patient Education: [x] Review HEP    [] Progressed/Changed HEP based on:   [] positioning   [] body mechanics   [] transfers   [] heat/ice application    [] other:      Other Objective/Functional Measures:   No change on current AROM  PROM abd ~100deg, flex ~130deg     Pain Level (0-10 scale) post treatment: 0    ASSESSMENT/Changes in Function:   Progressing well with min symptoms during therapy. Most limitations with abd, no change in AROM or strength. Patient will continue to benefit from skilled PT services to modify and progress therapeutic interventions, address functional mobility deficits, address ROM deficits and address strength deficits to attain remaining goals. [x]  See Plan of Care  []  See progress note/recertification  []  See Discharge Summary         Progress towards goals / Updated goals:  Short Term Goals:  To be accomplished in 2 weeks:  1- Pt will be able to AROM flex to 100 in order to improve ADLs. Status at eval: AROM 45  Current: no changes    2-Pt will be independent with HEP to perform daily without increased pain. Status at eval: HEP initiated  Current: compliant with current HEP    3- Pt will increase strength to left shoulder flex/abd to >/= 3+/5 in order to reach without compensation. Status at eval: 3-/5  Current: no chnage      Long Term Goals: To be accomplished in 4 weeks:  1- Pt will increase strength to Left UE to 4-/5 or greater in order to increase independence with ADLs/  Status at eval: 3-/5  2- Pt will be able to put on a jacket independently and without compensation. Status at eval: 0%  3- Pt will increase FOTO to >/=66/100 to improve functional status.   Status at eval: 49/100         PLAN  [x]  Upgrade activities as tolerated     []  Continue plan of care  []  Update interventions per flow sheet       []  Discharge due to:_  []  Other:_      Velma Blackburn PTA 4/4/2017  10:12 AM    Future Appointments  Date Time Provider Kiki Carcamo   4/7/2017 11:00 AM Aniket Schwab PT [facility]   4/10/2017 2:00 PM Aniket Schwab PT [facility]   4/11/2017 1:30 PM Aniket Schwab PT [facility]   4/14/2017 10:30 AM Christine Wong PT [facility]   4/17/2017 1:30 PM Christine Wong PT [facility]   4/18/2017 1:00 PM Aniket Schwab PT [facility]   4/21/2017 10:30 AM Lala Roque PT [facility]   4/24/2017 2:00 PM Aniket Schwab PT [facility]   4/25/2017 1:30 PM Christine Wong PT [facility]   4/28/2017 10:30 AM Christine Wong, PT [facility]

## 2017-04-07 ENCOUNTER — HOSPITAL ENCOUNTER (OUTPATIENT)
Dept: PHYSICAL THERAPY | Age: 67
Discharge: HOME | End: 2017-04-07
Payer: MEDICARE

## 2017-04-07 PROCEDURE — 97140 MANUAL THERAPY 1/> REGIONS: CPT

## 2017-04-07 PROCEDURE — 97110 THERAPEUTIC EXERCISES: CPT

## 2017-04-07 NOTE — PROGRESS NOTES
PT DAILY TREATMENT NOTE - [facility]     Patient Name: Ced Kumar  Date:2017  : 1950   [x]  Patient  Verified  Payor: Lenora Naranjo / Plan: [facility] / Product Type: Managed Care Medicare /    In time:10:55  Out time:12:00  Total Treatment Time (min): 65  Total Timed Codes (min): 65  1:1 Treatment Time ( [address] only): 72   Visit #: 4 of 12    Treatment Area: Left shoulder pain [M25.512]    SUBJECTIVE  Pain Level (0-10 scale): 4/10  Any medication changes, allergies to medications, adverse drug reactions, diagnosis change, or new procedure performed?: [x] No    [] Yes (see summary sheet for update)  Subjective functional status/changes:   [] No changes reported  \"Im doing okay. I have pain when I move my arm but not when I am resting it. I am not seeing Dr. Jacobs Seen anymore because of my insurance. I have an appointment with Dr. Chava Vu today. \"     OBJECTIVE    Modality rationale:    Min Type Additional Details    [] Estim:  []Unatt       []IFC  []Premod                        []Other:  []w/ice   []w/heat  Position:  Location:    [] Estim: []Att    []TENS instruct  []NMES                    []Other:  []w/US   []w/ice   []w/heat  Position:  Location:    []  Traction: [] Cervical       []Lumbar                       [] Prone          []Supine                       []Intermittent   []Continuous Lbs:  [] before manual  [] after manual    []  Ultrasound: []Continuous   [] Pulsed                           []1MHz   []3MHz W/cm2:  Location:    []  Iontophoresis with dexamethasone         Location: [] Take home patch   [] In clinic    []  Ice     []  heat  []  Ice massage  []  Laser   []  Anodyne Position:  Location:    []  Laser with stim  []  Other:  Position:  Location:    []  Vasopneumatic Device Pressure:       [] lo [] med [] hi   Temperature: [] lo [] med [] hi   [] Skin assessment post-treatment:  []intact []redness- no adverse reaction    []redness - adverse reaction: min []Eval                  []Re-Eval       50 min Therapeutic Exercise:  [x] See flow sheet : Added tband isometrics (flex, ext, add), added S/L ER, added standing flexion stretch   Rationale: increase ROM and increase strength to improve the patients ability to independently perform overhead ADLs      min Therapeutic Activity:  []  See flow sheet :         min Neuromuscular Re-education:  []  See flow sheet :       15 min Manual Therapy:  PROM (IR, ER, scap, flex)    Rationale: increase ROM and increase tissue extensibility to improve patient's ability to independently perform overhead ADLs      min Gait Training:  ___ feet with ___ device on level surfaces with ___ level of assist             With   [] TE   [] TA   [] neuro   [] other: Patient Education: [x] Review HEP    [] Progressed/Changed HEP based on:   [] positioning   [] body mechanics   [] transfers   [] heat/ice application    [] other:      Other Objective/Functional Measures:   Left shoulder A/PROM   Flexion: 48/153 degrees  Scaption/Abduction: 45/165 degrees  IR @ 70 degrees abd: not tested/ 70 degrees  ER @ 70 degrees abd: not tested/ 50 degrees     Pain Level (0-10 scale) post treatment: 0/10    ASSESSMENT/Changes in Function:    Overall, pt making fair progress since initial evaluation on 3/27/17. She demonstrates 100% independence and compliance with prescribed HEP. She gives good effort during sessions; however, her functional mobility deficits can be a limiting factor. Her left shoulder PROM is improvin degrees flexion, 65 degrees scaption, 70 degrees IR at 70 degrees of abduction, 50 degrees of ER at 70 degrees of abduction. Her left shoulder AROM is severely limited: 48 degrees flexion, 45 degrees scaption. She demonstrates 3-/5 left shoulder flexion and abduction strength with significant left shoulder hiking and trunk lean compensation. Pt reports 0/10 left shoulder pain at rest with an increase to 4/10 with movement of the arm.  Pt would benefit from continue skilled physical therapy services to address her above limitations. Patient will continue to benefit from skilled PT services to modify and progress therapeutic interventions, address functional mobility deficits, address ROM deficits and address strength deficits to attain remaining goals.      [x] See Plan of Care  [] See progress note/recertification  [] See Discharge Summary      Progress towards goals / Updated goals:  Short Term Goals: To be accomplished in 2 weeks:  1- Pt will be able to left shoulder AROM flex to 100 degrees in order to improve ADLs. Status at eval: AROM 45 degrees   Current: Progressing (Left shoulder AROM flexion 48 degrees) 4/7/17     2-Pt will be independent with HEP to perform daily without increased pain. Status at eval: HEP initiated  Current status: MET (pt reports 100% compliance and independence with HEP) 4/7/17      3- Pt will increase strength to left shoulder flex/abd to >/= 3+/5 in order to reach without compensation. Status at eval: 3-/5  Current:  No change (left shoulder flexion and abduction strength 3-/5) 4/7/17       Long Term Goals: To be accomplished in 4 weeks:  1- Pt will increase strength to Left UE to 4-/5 or greater in order to increase independence with ADLs/  Status at eval: 3-/5  Current status:No change (left shoulder flexion and abduction strength 3-/5) 4/7/17      2- Pt will be able to put on a jacket independently and without compensation. Status at eval: 0%  Current status: not assessed     3- Pt will increase FOTO to >/=66/100 to improve functional status.   Status at eval: 49/100  Current status: not assessed      PLAN  [x]  Upgrade activities as tolerated     [x]  Continue plan of care  []  Update interventions per flow sheet       []  Discharge due to:_  []  Other:_      Lia Mosley 4/7/2017  10:44 AM    Future Appointments  Date Time Provider Kiki Carcamo   4/7/2017 11:00 AM [facility] PT [id number] [facility] 4/10/2017 2:00 PM Jose J Nipple, PT [facility]   4/11/2017 1:30 PM Jose J Nipple, PT [facility]   4/14/2017 10:30 AM Christine Pelaez, PT [facility]   4/17/2017 1:30 PM Christine Pelaez, PT [facility]   4/18/2017 1:00 PM Jose J Nipанна, PT [facility]   4/21/2017 10:30 AM Miguelina Brownlee, PT [facility]   4/24/2017 2:00 PM Jose J Porter, PT [facility]   4/25/2017 1:30 PM Christine Pelaez PT [facility]   4/28/2017 10:30 AM Christine Pelaez, PT [facility]

## 2017-04-07 NOTE — PROGRESS NOTES
[facility] at [facility]. Shayna Paez, [address] [place] [address]  Phone: [phone] Fax: [phone]    Continued Plan of Care/ Re-certification for Physical Therapy Services    Patient name: Gaetano Rosario Start of Care: 3/27/17   Referral source: MD Gdieon Post MD : 1950   Medical/Treatment Diagnosis: Left shoulder pain [M25.512], left total shoulder replacement  Onset Date: 1/3/17     Prior Hospitalization: see medical history Provider#: [id number]   Medications: Verified on Patient Summary List    Comorbidities: BTKR, Spinal Stenosis, depression, osteoporosis, DM, arthritis, asthma, Hx CA  Prior Level of Function:Pt notes she lives alone and has been in motorized w/c for 12 years, but able to take care of herself    Visits from Start of Care: 4    Missed Visits: 0    The Plan of Care and following information is based on the patient's current status:    Short Term Goals: To be accomplished in 2 weeks:  1- Pt will be able to left shoulder AROM flex to 100 degrees in order to improve ADLs. Status at eval: AROM 45 degrees   Current: Progressing (Left shoulder AROM flexion 48 degrees)       2-Pt will be independent with HEP to perform daily without increased pain. Status at eval: HEP initiated  Current status: MET (pt reports 100% compliance and independence with HEP)       3- Pt will increase strength to left shoulder flex/abd to >/= 3+/5 in order to reach without compensation. Status at eval: 3-/5   Current: No change (left shoulder flexion and abduction strength 3-/5)         Long Term Goals: To be accomplished in 4 weeks:  1- Pt will increase strength to Left UE to 4-/5 or greater in order to increase independence with ADLs/  Status at eval: 3-/5  Current status: No change (left shoulder flexion and abduction strength 3-/5) 17      2- Pt will be able to put on a jacket independently and without compensation.   Status at eval: 0%  Current status: not assessed      3- Pt will increase FOTO to >/=66/100 to improve functional status. Status at eval: 49/100  Current status: not assessed     Problems/ barriers to goal attainment: None      Problem List: pain affecting function, decrease ROM, decrease strength, decrease ADL/ functional abilitiies and decrease activity tolerance    Treatment Plan: Therapeutic exercise, Therapeutic activities, Neuromuscular re-education, Physical agent/modality, Manual therapy, Aquatic therapy, Patient education, Self Care training, Functional mobility training and Home safety training     Patient Goal (s) has been updated and includes: \"Move my arm better\"      Goals for this certification period: Continue with unmet goals above. Address updated goals below. Updated Long Term Goals: to be completed in 4 weeks     1) Pt will demonstrate left shoulder PROM > or = 90% of normal in all directions in order to improve ADLs and facilitate AROM. Current Status: Left shoulder PROM: 153 degrees flexion, 65 degrees scaption, 70 degrees IR at 70 degrees of abduction, 50 degrees of ER at 70 degrees of abduction. Frequency / Duration: Patient to be seen 3 times per week for 4 weeks:    Assessment / Recommendations:   Overall, pt making fair progress since initial evaluation on 3/27/17. She demonstrates 100% independence and compliance with prescribed HEP. She gives good effort during sessions; however, her functional mobility deficits can be a limiting factor. Her left shoulder PROM is improvin degrees flexion, 65 degrees scaption, 70 degrees IR at 70 degrees of abduction, 50 degrees of ER at 70 degrees of abduction. Her left shoulder AROM is severely limited: 48 degrees flexion, 45 degrees scaption. She demonstrates 3-/5 left shoulder flexion and abduction strength with significant left shoulder hiking and trunk lean compensation. Pt reports 0/10 left shoulder pain at rest with an increase to 4/10 with movement of the arm.  Pt would benefit from continue skilled physical therapy services to address her above limitations. G-Codes (GP)  Carry   Current  CK= 40-59%   [id number] Goal  CJ= 20-39%    The severity rating is based on clinical judgment and the FOTO score. Certification Period: 3/27/17-4/26/17    [facility] [facility] [place] [place] 4/7/2017 12:23 PM    ________________________________________________________________________  I certify that the above Therapy Services are being furnished while the patient is under my care. I agree with the treatment plan and certify that this therapy is necessary. [] I have read the above and request that my patient continue as recommended. [] I have read the above report and request that my patient continue therapy with the following changes/special instructions: _______________________________________  [] I have read the above report and request that my patient be discharged from therapy    Physician's Signature:________________________________Date:___________Time:__________    Please sign and return to [facility] at [facility].  Rosa Paez, [facility] [address] [place]  Phone: [phone] Fax: [phone]

## 2017-04-10 ENCOUNTER — APPOINTMENT (OUTPATIENT)
Dept: PHYSICAL THERAPY | Age: 67
End: 2017-04-10
Payer: MEDICARE

## 2017-04-11 ENCOUNTER — HOSPITAL ENCOUNTER (OUTPATIENT)
Dept: PHYSICAL THERAPY | Age: 67
End: 2017-04-11
Payer: MEDICARE

## 2017-04-14 ENCOUNTER — HOSPITAL ENCOUNTER (OUTPATIENT)
Dept: PHYSICAL THERAPY | Age: 67
Discharge: HOME | End: 2017-04-14
Payer: MEDICARE

## 2017-04-14 ENCOUNTER — APPOINTMENT (OUTPATIENT)
Dept: PHYSICAL THERAPY | Age: 67
End: 2017-04-14
Payer: MEDICARE

## 2017-04-14 PROCEDURE — 97110 THERAPEUTIC EXERCISES: CPT

## 2017-04-14 PROCEDURE — 97530 THERAPEUTIC ACTIVITIES: CPT

## 2017-04-17 ENCOUNTER — APPOINTMENT (OUTPATIENT)
Dept: PHYSICAL THERAPY | Age: 67
End: 2017-04-17
Payer: MEDICARE

## 2017-04-17 NOTE — PROGRESS NOTES
PT DAILY TREATMENT NOTE - [facility]     Patient Name: Aylin Conteh  Date:2017  : 1950   [x]  Patient  Verified  Payor: Isabela Carvalho / Plan: [facility] / Product Type: Managed Care Medicare /    In time:1110  Out time:1155  Total Treatment Time (min): 45  Total Timed Codes (min): 45  1:1 Treatment Time ( [address] only): 39   Visit #: 5 of 12    Treatment Area: Left shoulder pain [M25.512]    SUBJECTIVE  Pain Level (0-10 scale): 1-2/10  Any medication changes, allergies to medications, adverse drug reactions, diagnosis change, or new procedure performed?: [x] No    [] Yes (see summary sheet for update)  Subjective functional status/changes:   [x] No changes reported        OBJECTIVE    Modality rationale:    Min Type Additional Details    [] Estim:  []Unatt       []IFC  []Premod                        []Other:  []w/ice   []w/heat  Position:  Location:    [] Estim: []Att    []TENS instruct  []NMES                    []Other:  []w/US   []w/ice   []w/heat  Position:  Location:    []  Traction: [] Cervical       []Lumbar                       [] Prone          []Supine                       []Intermittent   []Continuous Lbs:  [] before manual  [] after manual    []  Ultrasound: []Continuous   [] Pulsed                           []1MHz   []3MHz W/cm2:  Location:    []  Iontophoresis with dexamethasone         Location: [] Take home patch   [] In clinic    []  Ice     []  heat  []  Ice massage  []  Laser   []  Anodyne Position:  Location:    []  Laser with stim  []  Other:  Position:  Location:    []  Vasopneumatic Device Pressure:       [] lo [] med [] hi   Temperature: [] lo [] med [] hi   [] Skin assessment post-treatment:  []intact []redness- no adverse reaction    []redness - adverse reaction:      min []Eval                  []Re-Eval       15 min Therapeutic Exercise:  [x] See flow sheet : focus on ROM and strengthening   Rationale: increase ROM and increase strength to improve the patients ability to independently perform overhead ADLs     30 min Therapeutic Activity:  [x]  See flow sheet : assisted ROM to shoulder, PNF in supine to improve movement pattern and reduce shoulder compensation, scapula PNF for improved stability, updated therapeutic ex         min Neuromuscular Re-education:  []  See flow sheet :        min Manual Therapy:          min Gait Training:  ___ feet with ___ device on level surfaces with ___ level of assist             With   [] TE   [] TA   [] neuro   [] other: Patient Education: [x] Review HEP    [] Progressed/Changed HEP based on:   [] positioning   [] body mechanics   [] transfers   [] heat/ice application    [] other:      Other Objective/Functional Measures:   Marked compensatory shoulder hiking with active shoulder Flex/ABD    Pain Level (0-10 scale) post treatment: 0/10    ASSESSMENT/Changes in Function:    Overall, pt making fair progress since initial evaluation on 3/27/17. She demonstrates 100% independence and compliance with prescribed HEP. She gives good effort during sessions; however, her functional mobility deficits can be a limiting factor. Her left shoulder PROM is improvin degrees flexion, 65 degrees scaption, 70 degrees IR at 70 degrees of abduction, 50 degrees of ER at 70 degrees of abduction. Her left shoulder AROM is severely limited: 48 degrees flexion, 45 degrees scaption. She demonstrates 3-/5 left shoulder flexion and abduction strength with significant left shoulder hiking and trunk lean compensation. Pt reports 0/10 left shoulder pain at rest with an increase to 4/10 with movement of the arm. Pt would benefit from continue skilled physical therapy services to address her above limitations.      Patient will continue to benefit from skilled PT services to modify and progress therapeutic interventions, address functional mobility deficits, address ROM deficits and address strength deficits to attain remaining goals.      [x] See Plan of Care  [] See progress note/recertification  [] See Discharge Summary      Progress towards goals / Updated goals:  Short Term Goals: To be accomplished in 2 weeks:  1- Pt will be able to left shoulder AROM flex to 100 degrees in order to improve ADLs. Status at eval: AROM 45 degrees   Current: Progressing (Left shoulder AROM flexion 48 degrees) 4/7/17     2-Pt will be independent with HEP to perform daily without increased pain. Status at eval: HEP initiated  Current status: M ET(pt reports 100% compliance and independence with HEP) 4/7/17      3- Pt will increase strength to left shoulder flex/abd to >/= 3+/5 in order to reach without compensation. Status at eval: 3-/5  Current:  No change (left shoulder flexion and abduction strength 3-/5) 4/7/17       Long Term Goals: To be accomplished in 4 weeks:  1- Pt will increase strength to Left UE to 4-/5 or greater in order to increase independence with ADLs/  Status at [facility]: 3-/5  Current status:No change (left shoulder flexion and abduction strength 3-/5) 4/7/17      2- Pt will be able to put on a jacket independently and without compensation. Status at eval: 0%  Current status: not assessed     3- Pt will increase FOTO to >/=66/100 to improve functional status.   Status at eval: 49/100  Current status: 49/100, no change     PLAN  [x]  Upgrade activities as tolerated     [x]  Continue plan of care  []  Update interventions per flow sheet       []  Discharge due to:_  []  Other:_      Kassie Samayoa PT 4/16/2017  10:44 AM    Future Appointments  Date Time Provider Kiki Carcamo   4/18/2017 1:00 PM Jayro Myers PT [facility]   4/21/2017 10:30 AM R[facility] Watson[facility]   4/25/2017 1:00 PM Christine Delgado PT [facility]   4/27/2017 10:30 AM Germania Quevedo PTA [facility]   5/2/2017 10:30 AM [facility]ENA العليHPAM[facility]O [facility]   5/4/2017 10:30 AM ROWENA العلي [facility]   5/9/2017 10:30 AM [facility]   5/11/2017 10:30 AM Brooklyn Monae, [facility] RUDOLPH [facility]

## 2017-04-18 ENCOUNTER — HOSPITAL ENCOUNTER (OUTPATIENT)
Dept: PHYSICAL THERAPY | Age: 67
Discharge: HOME | End: 2017-04-18
Payer: MEDICARE

## 2017-04-18 ENCOUNTER — APPOINTMENT (OUTPATIENT)
Dept: PHYSICAL THERAPY | Age: 67
End: 2017-04-18
Payer: MEDICARE

## 2017-04-18 PROCEDURE — 97110 THERAPEUTIC EXERCISES: CPT

## 2017-04-18 PROCEDURE — 97140 MANUAL THERAPY 1/> REGIONS: CPT

## 2017-04-18 NOTE — PROGRESS NOTES
PT DAILY TREATMENT NOTE - [facility]     Patient Name: Debbi Modi  Date:2017  : 1950  [x]  Patient  Verified  Payor: Mylene Rinaldi / Plan: [facility] / Product Type: Managed Care Medicare /    In time:1:00  Out time:1:55  Total Treatment Time (min): 55  Total Timed Codes (min): 55  1:1 Treatment Time ( [address] only): 35   Visit #: 6 of 24    Treatment Area: Left shoulder pain [M25.512]    SUBJECTIVE  Pain Level (0-10 scale): 1-2/10  Any medication changes, allergies to medications, adverse drug reactions, diagnosis change, or new procedure performed?: [x] No    [] Yes (see summary sheet for update)  Subjective functional status/changes:   [] No changes reported  \"My pain is getting better. I am noticing things around the house are starting to get easier. \"     OBJECTIVE    Modality rationale:    Min Type Additional Details    [] Estim:  []Unatt       []IFC  []Premod                        []Other:  []w/ice   []w/heat  Position:  Location:    [] Estim: []Att    []TENS instruct  []NMES                    []Other:  []w/US   []w/ice   []w/heat  Position:  Location:    []  Traction: [] Cervical       []Lumbar                       [] Prone          []Supine                       []Intermittent   []Continuous Lbs:  [] before manual  [] after manual    []  Ultrasound: []Continuous   [] Pulsed                           []1MHz   []3MHz W/cm2:  Location:    []  Iontophoresis with dexamethasone         Location: [] Take home patch   [] In clinic    []  Ice     []  heat  []  Ice massage  []  Laser   []  Anodyne Position:  Location:    []  Laser with stim  []  Other:  Position:  Location:    []  Vasopneumatic Device Pressure:       [] lo [] med [] hi   Temperature: [] lo [] med [] hi   [] Skin assessment post-treatment:  []intact []redness- no adverse reaction    []redness - adverse reaction:      min []Eval                  []Re-Eval       40 total  20 1:1 min Therapeutic Exercise: [x] See flow sheet : added rail washes    Rationale: increase ROM and increase strength to improve the patients ability to normalize function      min Therapeutic Activity:  []  See flow sheet :         min Neuromuscular Re-education:  []  See flow sheet :       15 1:1 min Manual Therapy:  PROM (Flex, scaption, IR, ER)   Rationale: increase ROM and increase tissue extensibility to improve patient's ability to normalize function      min Gait Training:  ___ feet with ___ device on level surfaces with ___ level of assist             With   [] TE   [] TA   [] neuro   [] other: Patient Education: [x] Review HEP    [] Progressed/Changed HEP based on:   [] positioning   [] body mechanics   [] transfers   [] heat/ice application    [] other:      Other Objective/Functional Measures:   Left shoulder A/PROM  Flexion: 50/150 degrees  Scaption: 55/172 degrees  IR: not tested/71 degrees @ 70 degrees abduction   ER: not tested/ 60 degrees @ 70 degrees abduction       Pain Level (0-10 scale) post treatment: 1/10    ASSESSMENT/Changes in Function:   Pt able to complete session with good tolerance. She requires increased time to complete exercises. Her left shoulder PROM is continuing to improve: IR is full and unlimited, scaption is nearly normal, ER and flexion are mildly limited. Her AROM continues to demonstrate significant deficits.        Patient will continue to benefit from skilled PT services to modify and progress therapeutic interventions, address functional mobility deficits, address ROM deficits and address strength deficits to attain remaining goals.      [x] See Plan of Care  [] See progress note/recertification  [] See Discharge Summary      Progress towards goals / Updated goals:    Short Term Goals: To be accomplished in 2 weeks:  1- Pt will be able to left shoulder AROM flex to 100 degrees in order to improve ADLs.   Status at eval: AROM 45 degrees   Status at last progress noted (4/7/17): Progressing (Left shoulder AROM flexion 48 degrees)   Current status: Progressing (Left shoulder flexion 50 degrees) 4/18/17      2-Pt will be independent with HEP to perform daily without increased pain. Status at eval: HEP initiated  Status at last progress noted (4/7/17): MET (pt reports 100% compliance and independence with HEP)   Current status: MET       3- Pt will increase strength to left shoulder flex/abd to >/= 3+/5 in order to reach without compensation. Status at eval: 3-/5   Status at last progress noted (4/7/17): No change (left shoulder flexion and abduction strength 3-/5)    Current status: not assessed      Long Term Goals: To be accomplished in 4 weeks:  1- Pt will increase strength to Left UE to 4-/5 or greater in order to increase independence with ADLs/  Status at eval: 3-/5  Status at last progress noted (4/7/17): No change (left shoulder flexion and abduction strength 3-/5)   Current status: not assessed      2- Pt will be able to put on a jacket independently and without compensation. Status at eval: 0%  Status at last progress noted (4/7/17): not assessed  Current status: not assessed       3- Pt will increase FOTO to >/=66/100 to improve functional status.   Status at eval: 49/100  Status at last progress noted (4/7/17):not assessed   Current status: No change (FOTO score 49/100) 4/11/17     PLAN  [x]  Upgrade activities as tolerated     [x]  Continue plan of care  []  Update interventions per flow sheet       []  Discharge due to:_  []  Other:_      Homero Spurling 4/18/2017  12:52 PM    Future Appointments  Date Time Provider Kiki Carcamo   [facility]2017 [facility]   4/21/2017 10:30 AM Elena Schmidt PT [facility]   4/25/2017 1:00 PM Anette Fortunato Paget, PT [facility] THE [facility]   4/27/2017 10:30 AM Hi Patel PTA [facility] THE [facility]   5/2/2017 10:30 AM ROWENA Pulido [facility]   5/4/2017 10:30 AM ROWENA Pulido [facility]   5/9/2017 10:30 AM ROWENA Pulido [facility]   5/11/2017 10:30 AM Renita Fitzpatrick[facility]

## 2017-04-21 ENCOUNTER — APPOINTMENT (OUTPATIENT)
Dept: PHYSICAL THERAPY | Age: 67
End: 2017-04-21
Payer: MEDICARE

## 2017-04-21 ENCOUNTER — HOSPITAL ENCOUNTER (OUTPATIENT)
Dept: PHYSICAL THERAPY | Age: 67
Discharge: HOME | End: 2017-04-21
Payer: MEDICARE

## 2017-04-21 PROCEDURE — 97110 THERAPEUTIC EXERCISES: CPT

## 2017-04-21 NOTE — PROGRESS NOTES
PT DAILY TREATMENT NOTE - [facility]     Patient Name: Amado Chappell  Date:2017  : 1950  [x]  Patient  Verified  Payor: Donte Harper / Plan: [facility] / Product Type: Managed Care Medicare /    In time:10:40  Out time:11:35  Total Treatment Time (min): 55  Total Timed Codes (min): 55  1:1 Treatment Time ( [address] only): 48  Visit #: 7 of 24    Treatment Area: Left shoulder pain [M25.512]    SUBJECTIVE  Pain Level (0-10 scale): 3/10  Any medication changes, allergies to medications, adverse drug reactions, diagnosis change, or new procedure performed?: [x] No    [] Yes (see summary sheet for update)  Subjective functional status/changes:   [] No changes reported  \"I was sore after last treatment. \"    OBJECTIVE    Modality rationale:    Min Type Additional Details    [] Estim:  []Unatt       []IFC  []Premod                        []Other:  []w/ice   []w/heat  Position:  Location:    [] Estim: []Att    []TENS instruct  []NMES                    []Other:  []w/US   []w/ice   []w/heat  Position:  Location:    []  Traction: [] Cervical       []Lumbar                       [] Prone          []Supine                       []Intermittent   []Continuous Lbs:  [] before manual  [] after manual    []  Ultrasound: []Continuous   [] Pulsed                           []1MHz   []3MHz W/cm2:  Location:    []  Iontophoresis with dexamethasone         Location: [] Take home patch   [] In clinic    []  Ice     []  heat  []  Ice massage  []  Laser   []  Anodyne Position:  Location:    []  Laser with stim  []  Other:  Position:  Location:    []  Vasopneumatic Device Pressure:       [] lo [] med [] hi   Temperature: [] lo [] med [] hi   [] Skin assessment post-treatment:  []intact []redness- no adverse reaction    []redness - adverse reaction:      min []Eval                  []Re-Eval       55  total  53 1:1 min Therapeutic Exercise:  [x] See flow sheet :  Added wall washes, supine shoulder flexion, increased resistance of rows to green theraband, added finger ladder, increased resistance of supine shoulder IR/ER to yellow theraband   Rationale: increase ROM and increase strength to improve the patients ability to normalize function     min Therapeutic Activity:  []  See flow sheet :         min Neuromuscular Re-education:  []  See flow sheet :        min Manual Therapy:          min Gait Training:  ___ feet with ___ device on level surfaces with ___ level of assist   Rationale: With   [] TE   [] TA   [] neuro   [] other: Patient Education: [x] Review HEP    [] Progressed/Changed HEP based on:   [] positioning   [] body mechanics   [] transfers   [] heat/ice application    [] other:      Other Objective/Functional Measures:   Pt able to actively flex left shoulder to 100 degrees in supine position. Pain Level (0-10 scale) post treatment: 3/10    ASSESSMENT/Changes in Function:    Pt dis[played ability to elevate the left UE in supine over 90% independently, but weakness still present in the left shoulder. and UE. Patient will continue to benefit from skilled PT services to modify and progress therapeutic interventions, address functional mobility deficits, address ROM deficits and address strength deficits to attain remaining goals.      [x] See Plan of Care  [] See progress note/recertification  [] See Discharge Summary      Progress towards goals / Updated goals:     Short Term Goals: To be accomplished in 2 weeks:  1- Pt will be able to left shoulder AROM flex to 100 degrees in order to improve ADLs. Status at [facility]: AROM 45 degrees   Status at last progress noted (4/7/17): Progressing (Left shoulder AROM flexion 48 degrees)   Current status: Progressing (Left shoulder flexion 50 degrees) 4/18/17      2-Pt will be independent with HEP to perform daily without increased pain.   Status at eval: HEP initiated  Status at last progress noted (4/7/17): MET (pt reports 100% compliance and independence with HEP)   Current status: MET       3- Pt will increase strength to left shoulder flex/abd to >/= 3+/5 in order to reach without compensation. Status at eval: 3-/5   Status at last progress noted (4/7/17): No change (left shoulder flexion and abduction strength 3-/5)   Current status: not assessed      Long Term Goals: To be accomplished in 4 weeks:  1- Pt will increase strength to Left UE to 4-/5 or greater in order to increase independence with ADLs/  Status at eval: 3-/5  Status at last progress noted (4/7/17): No change (left shoulder flexion and abduction strength 3-/5)   Current status: not assessed      2- Pt will be able to put on a jacket independently and without compensation. Status at eval: 0%  Status at last progress noted (4/7/17): not assessed  Current status: not assessed       3- Pt will increase FOTO to >/=66/100 to improve functional status.   Status at eval: 49/100  Status at last progress noted (4/7/17):not assessed   Current status: No change (FOTO score 49/100) 4/11/17     PLAN  []  Upgrade activities as tolerated     [x]  Continue plan of care  []  Update interventions per flow sheet       []  Discharge due to:_  []  Other:_      Kelly Hernández, PT 4/21/2017  11:22 AM    Future Appointments  Date Time Provider Kiki Carcamo   4/25/2017 1:00 PM [facility]   4/27/2017 10:30 AM Gopi Arcos, PTA [facility]   5/2/2017 10:30 AM Cassidy Cho, PT [facility]   5/5/2017 10:30 AM ROWENA RussellHP[facility]   5/9/2017 10:30 AM Cassidy Cho, PT [facility]   5/12/2017 10:30 AM Lea young, PT [facility] THE [facility]

## 2017-04-24 ENCOUNTER — APPOINTMENT (OUTPATIENT)
Dept: PHYSICAL THERAPY | Age: 67
End: 2017-04-24
Payer: MEDICARE

## 2017-04-25 ENCOUNTER — APPOINTMENT (OUTPATIENT)
Dept: PHYSICAL THERAPY | Age: 67
End: 2017-04-25
Payer: MEDICARE

## 2017-04-25 ENCOUNTER — HOSPITAL ENCOUNTER (OUTPATIENT)
Dept: PHYSICAL THERAPY | Age: 67
Discharge: HOME | End: 2017-04-25
Payer: MEDICARE

## 2017-04-25 PROCEDURE — 97110 THERAPEUTIC EXERCISES: CPT

## 2017-04-25 PROCEDURE — 97140 MANUAL THERAPY 1/> REGIONS: CPT

## 2017-04-25 NOTE — PROGRESS NOTES
PT DAILY TREATMENT NOTE - [facility]     Patient Name: Mat Peoples  Date:2017  : 1950  [x]  Patient  Verified  Payor: Gonzalo Cummings / Plan: [facility] / Product Type: Managed Care Medicare /    In time:110  Out time:  Total Treatment Time (min): 55  Total Timed Codes (min): 55  1:1 Treatment Time ( [address] only): 48  Visit #: 8 of 24    Treatment Area: Left shoulder pain [M25.512]    SUBJECTIVE  Pain Level (0-10 scale): 3/10  Any medication changes, allergies to medications, adverse drug reactions, diagnosis change, or new procedure performed?: [x] No    [] Yes (see summary sheet for update)  Subjective functional status/changes:   [] No changes reported  Reports muscle soreness after last visit due to new activities, feeling better now    OBJECTIVE    Modality rationale:    Min Type Additional Details    [] Estim:  []Unatt       []IFC  []Premod                        []Other:  []w/ice   []w/heat  Position:  Location:    [] Estim: []Att    []TENS instruct  []NMES                    []Other:  []w/US   []w/ice   []w/heat  Position:  Location:    []  Traction: [] Cervical       []Lumbar                       [] Prone          []Supine                       []Intermittent   []Continuous Lbs:  [] before manual  [] after manual    []  Ultrasound: []Continuous   [] Pulsed                           []1MHz   []3MHz W/cm2:  Location:    []  Iontophoresis with dexamethasone         Location: [] Take home patch   [] In clinic    []  Ice     []  heat  []  Ice massage  []  Laser   []  Anodyne Position:  Location:    []  Laser with stim  []  Other:  Position:  Location:    []  Vasopneumatic Device Pressure:       [] lo [] med [] hi   Temperature: [] lo [] med [] hi   [] Skin assessment post-treatment:  []intact []redness- no adverse reaction    []redness - adverse reaction:      min []Eval                  []Re-Eval       30 min Therapeutic Exercise:  [x] See flow sheet :  Added isometrics in supine with orange SB   Rationale: increase ROM and increase strength to improve the patients ability to normalize function     min Therapeutic Activity:  []  See flow sheet :         min Neuromuscular Re-education:  []  See flow sheet :       15 min Manual Therapy: muscle reeducation to minimize left shoulder hiking during antigravity ROM, assisted left shoulder ROM in sitting and supine    Rationale: improved functional mobility     min Gait Training:  ___ feet with ___ device on level surfaces with ___ level of assist   Rationale: With   [] TE   [] TA   [] neuro   [] other: Patient Education: [x] Review HEP    [] Progressed/Changed HEP based on:   [] positioning   [] body mechanics   [] transfers   [] heat/ice application    [] other:      Other Objective/Functional Measures:   Pt able to actively flex left shoulder to 100 degrees in supine position. Active Flex in sitting 40 deg, marked shoulder hiking     Pain Level (0-10 scale) post treatment: 3/10    ASSESSMENT/Changes in Function:    Continues with marked weakness and poor scapular stability    Patient will continue to benefit from skilled PT services to modify and progress therapeutic interventions, address functional mobility deficits, address ROM deficits and address strength deficits to attain remaining goals.      [x] See Plan of Care  [] See progress note/recertification  [] See Discharge Summary      Progress towards goals / Updated goals:     Short Term Goals: To be accomplished in 2 weeks:  1- Pt will be able to left shoulder AROM flex to 100 degrees in order to improve ADLs. Status at [facility]: AROM 45 degrees   Status at last progress noted (4/7/17): Progressing (Left shoulder AROM flexion 48 degrees)   Current status: Progressing (Left shoulder flexion 40 degrees)       2-Pt will be independent with HEP to perform daily without increased pain.   Status at [facility]: HEP initiated  Status at last progress noted (4/7/17): MET (pt reports 100% compliance and independence with HEP)   Current status: MET       3- Pt will increase strength to left shoulder flex/abd to >/= 3+/5 in order to reach without compensation. Status at eval: 3-/5   Status at last progress noted (4/7/17): No change (left shoulder flexion and abduction strength 3-/5)   Current status: continues with marked weakness 3-/5 MMT, no progress      Long Term Goals: To be accomplished in 4 weeks:  1- Pt will increase strength to Left UE to 4-/5 or greater in order to increase independence with ADLs/  Status at eval: 3-/5  Status at last progress noted (4/7/17): No change (left shoulder flexion and abduction strength 3-/5)   Current status: not assessed      2- Pt will be able to put on a jacket independently and without compensation. Status at eval: 0%  Status at last progress noted (4/7/17): not assessed  Current status: not assessed       3- Pt will increase FOTO to >/=66/100 to improve functional status.   Status at eval: 49/100  Status at last progress noted (4/7/17):not assessed   Current status: No change (FOTO score 49/100) 4/11/17     PLAN  []  Upgrade activities as tolerated     [x]  Continue plan of care  []  Update interventions per flow sheet       []  Discharge due to:_  []  Other:_      Teodora Anand, PT 4/25/2017  11:22 AM    Future Appointments  Date Time Provider Kiki Carcamo   4/27/2017 10:30 AM Shefali Swann PTA [facility] THE [facility]   5/2/2017 10:30 AM Teodora Anand, PT [facility]   5/5/2017 10:30 AM Ariel Harmon PT [facility]   5/9/2017 10:30 AM ROWENA MoreHPAMANDO THE [facility]   5/12/2017 10:30 AM Calvin Francois, PT [facility]

## 2017-04-27 ENCOUNTER — HOSPITAL ENCOUNTER (OUTPATIENT)
Dept: PHYSICAL THERAPY | Age: 67
Discharge: HOME | End: 2017-04-27
Payer: MEDICARE

## 2017-04-27 PROCEDURE — 97110 THERAPEUTIC EXERCISES: CPT

## 2017-04-27 NOTE — PROGRESS NOTES
PT DAILY TREATMENT NOTE - [facility]     Patient Name: Jessika Segundo  Date:2017  : 1950  [x]  Patient  Verified  Payor: Christiano Macmazin / Plan: [employer] / Product Type: Managed Care Medicare /    In time:1035  Out time:1115  Total Treatment Time (min): 40  Total Timed Codes (min): 40  1:1 Treatment Time ([address] only): 40   Visit #: 9 of 24     Treatment Area: Left shoulder pain [M25.512]    SUBJECTIVE  Pain Level (0-10 scale):4  Any medication changes, allergies to medications, adverse drug reactions, diagnosis change, or new procedure performed?: [x] No    [] Yes (see summary sheet for update)  Subjective functional status/changes:   [] No changes reported  Biggtest limitations with strength, \"want to get stronger\"  Chief c/o soreness in regards to pain.,    OBJECTIVE        40 min Therapeutic Exercise:  [] See flow sheet :   Rationale: increase ROM, increase strength and improve coordination           With   [] TE   [] TA   [] neuro   [] other: Patient Education: [x] Review HEP    [] Progressed/Changed HEP based on:   [] positioning   [] body mechanics   [] transfers   [] heat/ice application    [] other:      Other Objective/Functional Measures:    Pt able to actively flex left shoulder to 100 degrees in supine position. Active Flex in sitting 40 deg, marked shoulder hiking      Pain Level (0-10 scale) post treatment: 3    ASSESSMENT/Changes in Function:   No further changes in strength or ROM,   Still unable to don/doff a jacket  Limitations persist with strength and ROM    Patient will continue to benefit from skilled PT services to modify and progress therapeutic interventions, address functional mobility deficits, address ROM deficits and address strength deficits to attain remaining goals. [x]  See Plan of Care  []  See progress note/recertification  []  See Discharge Summary         Progress towards goals / Updated goals:  Short Term Goals:  To be accomplished in 2 weeks:  1- Pt will be able to left shoulder AROM flex to 100 degrees in order to improve ADLs. Status at eval: AROM 45 degrees   Status at last progress noted (4/7/17): Progressing (Left shoulder AROM flexion 48 degrees)   Current status: Progressing (Left shoulder flexion 40 degrees) no changes       2-Pt will be independent with HEP to perform daily without increased pain. Status at eval: HEP initiated  Status at last progress noted (4/7/17): MET (pt reports 100% compliance and independence with HEP)   Current status: MET       3- Pt will increase strength to left shoulder flex/abd to >/= 3+/5 in order to reach without compensation. Status at eval: 3-/5   Status at last progress noted (4/7/17): No change (left shoulder flexion and abduction strength 3-/5)   Current status: continues with marked weakness 3-/5 MMT, no further progress      Long Term Goals: To be accomplished in 4 weeks:  1- Pt will increase strength to Left UE to 4-/5 or greater in order to increase independence with ADLs/  Status at eval: 3-/5  Status at last progress noted (4/7/17): No change (left shoulder flexion and abduction strength 3-/5)   Current status: 3-/5      2- Pt will be able to put on a jacket independently and without compensation. Status at eval: 0%  Status at last progress noted (4/7/17): not assessed  Current status: unable      3- Pt will increase FOTO to >/=66/100 to improve functional status.   Status at eval: 49/100  Status at last progress noted (4/7/17):not assessed   Current status: No change (FOTO score 49/100) 4/11/17     PLAN  [x]  Upgrade activities as tolerated     [x]  Continue plan of care  []  Update interventions per flow sheet       []  Discharge due to:_  []  Other:_      Aldair Bennett PTA 4/27/2017  10:40 AM    Future Appointments  Date Time Provider Kiki Carcamo   5/2/2017 10:30 AM Donel Graft, PT [facility]   5/5/2017 10:30 AM Donel Graft, PT [facility]   5/9/2017 10:30 AM Eugenia Jamison, PT [facility]PT[facility] THE [facility]   5/12/2017 10:30 AM ROWENA Cuellar THE [facility]

## 2017-04-28 ENCOUNTER — APPOINTMENT (OUTPATIENT)
Dept: PHYSICAL THERAPY | Age: 67
End: 2017-04-28
Payer: MEDICARE

## 2017-05-02 ENCOUNTER — HOSPITAL ENCOUNTER (OUTPATIENT)
Dept: PHYSICAL THERAPY | Age: 67
Discharge: HOME | End: 2017-05-02
Payer: MEDICARE

## 2017-05-02 PROCEDURE — 97110 THERAPEUTIC EXERCISES: CPT

## 2017-05-02 NOTE — PROGRESS NOTES
PT DAILY TREATMENT NOTE - [facility]     Patient Name: Aylin Conteh  Date:2017  : 1950  [x]  Patient  Verified  Payor: Isabela Carvalho / Plan: [facility] / Product Type: Managed Care Medicare /    In time:10:35  Out time:11:27  Total Treatment Time (min): 52  Total Timed Codes (min): 52  1:1 Treatment Time ( [address] only): 42   Visit #: 10 of 24    Treatment Area: Left shoulder pain [M25.512]    SUBJECTIVE  Pain Level (0-10 scale): 1/10  Any medication changes, allergies to medications, adverse drug reactions, diagnosis change, or new procedure performed?: [x] No    [] Yes (see summary sheet for update)  Subjective functional status/changes:   [] No changes reported  \"It doesn't hurt much now. \"    OBJECTIVE    Modality rationale:    Min Type Additional Details    [] Estim:  []Unatt       []IFC  []Premod                        []Other:  []w/ice   []w/heat  Position:  Location:    [] Estim: []Att    []TENS instruct  []NMES                    []Other:  []w/US   []w/ice   []w/heat  Position:  Location:    []  Traction: [] Cervical       []Lumbar                       [] Prone          []Supine                       []Intermittent   []Continuous Lbs:  [] before manual  [] after manual    []  Ultrasound: []Continuous   [] Pulsed                           []1MHz   []3MHz W/cm2:  Location:    []  Iontophoresis with dexamethasone         Location: [] Take home patch   [] In clinic    []  Ice     []  heat  []  Ice massage  []  Laser   []  Anodyne Position:  Location:    []  Laser with stim  []  Other:  Position:  Location:    []  Vasopneumatic Device Pressure:       [] lo [] med [] hi   Temperature: [] lo [] med [] hi   [] Skin assessment post-treatment:  []intact []redness- no adverse reaction    []redness - adverse reaction:      min []Eval                  []Re-Eval       52 total  42 1:1 min Therapeutic Exercise:  [x] See flow sheet :   Rationale: increase ROM and increase strength to improve the patients ability to normalize function. min Therapeutic Activity:  []  See flow sheet :         min Neuromuscular Re-education:  []  See flow sheet :        min Manual Therapy:          min Gait Training:  ___ feet with ___ device on level surfaces with ___ level of assist   Rationale: With   [] TE   [] TA   [] neuro   [] other: Patient Education: [x] Review HEP    [] Progressed/Changed HEP based on:   [] positioning   [] body mechanics   [] transfers   [] heat/ice application    [] other:      Other Objective/Functional Measures:   Shoulder flexion: 3-/5  Abduction: 3-/5  IR: 4-/5  ER: 3-/5  Elbow flexion: 4+/5  Elbow extension: 4/5   strength left: 36, 43, 35; av.0 lbs.  strength right: 54, 51, 48; av.0 lbs. AROM left shoulder flexion: 72 degrees in sitting, 130 degrees in supine    Pain Level (0-10 scale) post treatment: 0/10    ASSESSMENT/Changes in Function:    Pt's AROM of the shoulder has improved, but is still not functional.  Strength of the left UE has improved also, but is also not yet functional for the shoulder. Patient will continue to benefit from skilled PT services to modify and progress therapeutic interventions, address functional mobility deficits, address ROM deficits and address strength deficits to attain remaining goals. []  See Plan of Care  []  See progress note/recertification  []  See Discharge Summary         Progress towards goals / Updated goals:  Short Term Goals: To be accomplished in 2 weeks:  1- Pt will be able to left shoulder AROM flex to 100 degrees in order to improve ADLs. Status at [facility]: AROM 45 degrees   Status at last progress noted (17): Progressing (Left shoulder AROM flexion 48 degrees)   Current status: Progressing (AROM left shoulder flexion: 72 degrees in sitting, 130 degrees in supine) 17       2-Pt will be independent with HEP to perform daily without increased pain.   Status at eval: HEP initiated  Status at last progress noted (4/7/17): MET (pt reports 100% compliance and independence with HEP)   Current status: MET       3- Pt will increase strength to left shoulder flex/abd to >/= 3+/5 in order to reach without compensation. Status at eval: 3-/5   Status at last progress noted (4/7/17): No change (left shoulder flexion and abduction strength 3-/5)   Current status: no change (Strength shoulder flexion: 3-/5, Abduction: 3-/5) 5/02/17      Long Term Goals: To be accomplished in 4 weeks:  1- Pt will increase strength to Left UE to 4-/5 or greater in order to increase independence with ADLs/  Status at eval: 3-/5  Status at last progress noted (4/7/17): No change (left shoulder flexion and abduction strength 3-/5)   Current status: no change (Strength shoulder flexion: 3-/5, Abduction: 3-/5) 5/02/17      2- Pt will be able to put on a jacket independently and without compensation. Status at eval: 0%  Status at last progress noted (4/7/17): not assessed  Current status: unable      3- Pt will increase FOTO to >/=66/100 to improve functional status.   Status at eval: 49/100  Status at last progress noted (4/7/17):not assessed   Current status: progressing (FOTO: 57/100) 5/02/17    PLAN  []  Upgrade activities as tolerated     [x]  Continue plan of care  []  Update interventions per flow sheet       []  Discharge due to:_  []  Other:_      Justina Aguiar PT 5/2/2017  10:43 AM    Future Appointments  Date Time Provider Kiki Carcamo   5/5/2017 10:30 AM Justina Aguiar PT [facility]   5/9/2017 10:30 AM ROWENA BushHPAMANDO [facility]   5/12/2017 10:30 AM THE [facility]

## 2017-05-02 NOTE — PROGRESS NOTES
[facility] in [address] [place], [facility] [address]  Phone: [phone]      Fax:  [phone]    Continued Plan of Care/ Re-certification for Physical Therapy Services    Patient name: Mat Peoples Start of Care: 3/27/17   Referral source: Vivian Torres MD : 1950   Medical/Treatment Diagnosis: Left shoulder pain [M25.512] Onset Date:1/3/17     Prior Hospitalization: see medical history Provider#: [id number]   Medications: Verified on Patient Summary List    Comorbidities: Bilateral TKR, Spinal Stenosis, depression, osteoporosis, DM, arthritis, asthma, Hx CA  Prior Level of Function:Patient notes she lives alone and has been in motorized w/c for 12 years, but able to take care of herself    Visits from Start of Care: 8    Missed Visits: 1      Progress towards goals / Updated goals: Mrs. Apple Hawley has been showing progress in left shoulder mobility, pain levels and function. Primary deficit remains left shoulder strength limiting AROM. Short Term Goals: To be accomplished in 2 weeks:  1- Pt will be able to left shoulder AROM flex to 100 degrees in order to improve ADLs. Status at eval: AROM 45 degrees   Status at last progress noted (17): Progressing (Left shoulder AROM flexion 48 degrees)   Current status: Progressing (AROM left shoulder flexion: 72 degrees in sitting, 130 degrees in supine) 17       2-Pt will be independent with HEP to perform daily without increased pain. Status at eval: HEP initiated  Status at last progress noted (17): MET (pt reports 100% compliance and independence with HEP)   Current status: MET       3- Pt will increase strength to left shoulder flex/abd to >/= 3+/5 in order to reach without compensation. Status at eval: 3-/5   Status at last progress noted (17):  No change (left shoulder flexion and abduction strength 3-/5)   Current status: no change (Strength shoulder flexion: 3-/5, Abduction: 3-/5) 17      Long Term Goals: To be accomplished in 4 weeks:  1- Pt will increase strength to Left UE to 4-/5 or greater in order to increase independence with ADLs/  Status at [facility]: 3-/5  Status at last progress noted (4/7/17): No change (left shoulder flexion and abduction strength 3-/5)   Current status: no change (Strength shoulder flexion: 3-/5, Abduction: 3-/5) 5/02/17      2- Pt will be able to put on a jacket independently and without compensation. Status at [facility]: 0%  Status at last progress noted (4/7/17): not assessed  Current status: unable      3- Pt will increase FOTO to >/=66/100 to improve functional status. Status at [facility]: 49/100  Status at last progress noted (4/7/17):not assessed   Current status: progressing (FOTO: 57/100) 5/02/17    Key functional changes: passive assisted mobility left shoulder and function      Problems/ barriers to goal attainment: left shoulder weakness     Problem List: decrease ROM and decrease strength    Treatment Plan: Therapeutic exercise, Therapeutic activities, Neuromuscular re-education, Physical agent/modality, Manual therapy and Patient education         Frequency / Duration: Patient to be seen 2 times per week for 6 weeks:    Assessment / Recommendations:continue with present treatment and focus on shoulder strength/function    G-Codes (GP)  Mobility    Position    Carry    Goal  CJ= 20-39%   D/C  CK= 40-59%  Self Care      The severity rating is based on clinical judgment and the FOTO score. Certification Period: 4/27/17-6/26/17    Bri Lopez, PT 5/2/2017 12:22 PM    ________________________________________________________________________  I certify that the above Therapy Services are being furnished while the patient is under my care. I agree with the treatment plan and certify that this therapy is necessary. [] I have read the above and request that my patient continue as recommended.   [] I have read the above report and request that my patient continue therapy with the following changes/special instructions: ______________________________________  [] I have read the above report and request that my patient be discharged from therapy    Physician's Signature:_______________________________Date:___________Time:__________    Please sign and return to   [facility] in [address]  [place] [address]  Phone: [phone]      Fax:  [phone]

## 2017-05-04 ENCOUNTER — APPOINTMENT (OUTPATIENT)
Dept: PHYSICAL THERAPY | Age: 67
End: 2017-05-04
Payer: MEDICARE

## 2017-05-05 ENCOUNTER — APPOINTMENT (OUTPATIENT)
Dept: PHYSICAL THERAPY | Age: 67
End: 2017-05-05
Payer: MEDICARE

## 2017-05-09 ENCOUNTER — HOSPITAL ENCOUNTER (OUTPATIENT)
Dept: PHYSICAL THERAPY | Age: 67
Discharge: HOME | End: 2017-05-09
Payer: MEDICARE

## 2017-05-09 PROCEDURE — 97140 MANUAL THERAPY 1/> REGIONS: CPT

## 2017-05-09 PROCEDURE — 97110 THERAPEUTIC EXERCISES: CPT

## 2017-05-09 NOTE — PROGRESS NOTES
PT DAILY TREATMENT NOTE - [facility]     Patient Name: Merced Isidro  Date:2017  : 1950  [x]  Patient  Verified  Payor: Trei Augustin / Plan: [facility] CARE / Product Type: Managed Care Medicare /    In time:10:30  Out time:1115  Total Treatment Time (min): 45  Total Timed Codes (min): 45  1:1 Treatment Time ( [address] only): 30   Visit #: 11 of 24    Treatment Area: Left shoulder pain [M25.512]    SUBJECTIVE  Pain Level (0-10 scale): 0/10  Any medication changes, allergies to medications, adverse drug reactions, diagnosis change, or new procedure performed?: [x] No    [] Yes (see summary sheet for update)  Subjective functional status/changes:   [] No changes reported  \"I can put on my jacket now. That was one of my goals. Also got order for TENS unit.  My pain management doctor is trying to decrease my medication\"    OBJECTIVE    Modality rationale:    Min Type Additional Details    [] Estim:  []Unatt       []IFC  []Premod                        []Other:  []w/ice   []w/heat  Position:  Location:    [] Estim: []Att    []TENS instruct  []NMES                    []Other:  []w/US   []w/ice   []w/heat  Position:  Location:    []  Traction: [] Cervical       []Lumbar                       [] Prone          []Supine                       []Intermittent   []Continuous Lbs:  [] before manual  [] after manual    []  Ultrasound: []Continuous   [] Pulsed                           []1MHz   []3MHz W/cm2:  Location:    []  Iontophoresis with dexamethasone         Location: [] Take home patch   [] In clinic    []  Ice     []  heat  []  Ice massage  []  Laser   []  Anodyne Position:  Location:    []  Laser with stim  []  Other:  Position:  Location:    []  Vasopneumatic Device Pressure:       [] lo [] med [] hi   Temperature: [] lo [] med [] hi   [] Skin assessment post-treatment:  []intact []redness- no adverse reaction    []redness - adverse reaction:      min []Eval                  []Re-Eval 30 min Therapeutic Exercise:  [x] See flow sheet :   Rationale: increase ROM and increase strength to improve the patients ability to normalize function. min Therapeutic Activity:  []  See flow sheet :         min Neuromuscular Re-education:  []  See flow sheet :       15 min Manual Therapy: AAROM left shoulder in supine, alternating holds at 90 deg shoulder Flex         min Gait Training:  ___ feet with ___ device on level surfaces with ___ level of assist   Rationale: With   [] TE   [] TA   [] neuro   [] other: Patient Education: [x] Review HEP    [] Progressed/Changed HEP based on:   [] positioning   [] body mechanics   [] transfers   [] heat/ice application    [] other:      Other Objective/Functional Measures:   Shoulder flexion: 3-/5  Abduction: 3-/5  IR: 4-/5  ER: 3-/5  Elbow flexion: 4+/5  Elbow extension: 4/5   strength left: 36, 43, 35; av.0 lbs.  strength right: 54, 51, 48; av.0 lbs. AROM left shoulder flexion: 72 degrees in sitting, 130 degrees in supine    Pain Level (0-10 scale) post treatment: 0/10    ASSESSMENT/Changes in Function:    Pt's AROM of the shoulder has improved, but is still not functional.  Strength of the left UE has improved also, but is also not yet functional for the shoulder. Patient will continue to benefit from skilled PT services to modify and progress therapeutic interventions, address functional mobility deficits, address ROM deficits and address strength deficits to attain remaining goals. [x]  See Plan of Care  []  See progress note/recertification  []  See Discharge Summary         Progress towards goals / Updated goals:  Short Term Goals: To be accomplished in 2 weeks:  1- Pt will be able to left shoulder AROM flex to 100 degrees in order to improve ADLs.   Status at eval: AROM 45 degrees   Status at last progress noted (17): Progressing (Left shoulder AROM flexion 48 degrees)   Current status: Progressing (AROM left shoulder flexion: 72 degrees in sitting, 130 degrees in supine) 5/02/17       2-Pt will be independent with HEP to perform daily without increased pain. Status at eval: HEP initiated  Status at last progress noted (4/7/17): MET (pt reports 100% compliance and independence with HEP)   Current status: MET       3- Pt will increase strength to left shoulder flex/abd to >/= 3+/5 in order to reach without compensation. Status at eval: 3-/5   Status at last progress noted (4/7/17): No change (left shoulder flexion and abduction strength 3-/5)   Current status: no change (Strength shoulder flexion: 3-/5, Abduction: 3-/5) 5/02/17      Long Term Goals: To be accomplished in 4 weeks:  1- Pt will increase strength to Left UE to 4-/5 or greater in order to increase independence with ADLs/  Status at eval: 3-/5  Status at last progress noted (4/7/17): No change (left shoulder flexion and abduction strength 3-/5)   Current status: no change (Strength shoulder flexion: 3-/5, Abduction: 3-/5) 5/02/17      2- Pt will be able to put on a jacket independently and without compensation. Status at eval: 0%  Status at last progress noted (4/7/17): not assessed  Current status: patient demonstrating the ability to put on her jacket, goal met      3- Pt will increase FOTO to >/=66/100 to improve functional status.   Status at eval: 49/100  Status at last progress noted (4/7/17):not assessed   Current status: progressing (FOTO: 57/100) 5/02/17    PLAN  []  Upgrade activities as tolerated     [x]  Continue plan of care  []  Update interventions per flow sheet       []  Discharge due to:_  []  Other:_      Radha Andersen PT 5/9/2017  10:43 AM    Future Appointments  Date Time Provider Kiki Carcamo   5/12/2017 10:30 AM THE [facility]

## 2017-05-11 ENCOUNTER — APPOINTMENT (OUTPATIENT)
Dept: PHYSICAL THERAPY | Age: 67
End: 2017-05-11
Payer: MEDICARE

## 2017-05-12 ENCOUNTER — HOSPITAL ENCOUNTER (OUTPATIENT)
Dept: PHYSICAL THERAPY | Age: 67
Discharge: HOME | End: 2017-05-12
Payer: MEDICARE

## 2017-05-12 PROCEDURE — 97530 THERAPEUTIC ACTIVITIES: CPT

## 2017-05-12 PROCEDURE — 97110 THERAPEUTIC EXERCISES: CPT

## 2017-05-12 NOTE — PROGRESS NOTES
PT DAILY TREATMENT NOTE - [facility]    Patient Name: Amado Chappell  Date:2017  : 1950  [x]  Patient  Verified  Payor: Donte Bellotana / Plan[place] [facility] / Product Type: Managed Care Medicare /    In time:10:32  Out time:11:38  Total Treatment Time (min): 66  Total Timed Codes (min): 66  1:1 Treatment Time ( [address] only): 66   Visit #: 12 of 24    Treatment Area: Left shoulder pain [M25.512]    SUBJECTIVE  Pain Level (0-10 scale): 2/10  Any medication changes, allergies to medications, adverse drug reactions, diagnosis change, or new procedure performed?: [x] No    [] Yes (see summary sheet for update)  Subjective functional status/changes:   [] No changes reported  \"My shoulder hurts a little today but not bad really. \"    OBJECTIVE      46 min Therapeutic Exercise:  [x] See flow sheet :   Rationale: increase ROM, increase strength and improve coordination to improve the patients ability to perform ADLs with less pain. 20 min Therapeutic Activity:  [x]  See flow sheet :   Rationale: increase ROM, increase strength and improve coordination  to improve the patients ability to seasonal allergic rhinitis              With   [] TE   [] TA   [] neuro   [] other: Patient Education: [x] Review HEP    [] Progressed/Changed HEP based on:   [] positioning   [] body mechanics   [] transfers   [] heat/ice application    [] other:      Other Objective/Functional Measures:      Pain Level (0-10 scale) post treatment: 1/10    ASSESSMENT/Changes in Function: Pt was greatly challenged by flex AAROM ex. Pt continues to attempt compensation with shoulder hike when performing AROM past 45 deg. Pt denied modalities post tx.      Patient will continue to benefit from skilled PT services to modify and progress therapeutic interventions, address functional mobility deficits, address ROM deficits, address strength deficits, analyze and address soft tissue restrictions and analyze and modify body mechanics/ergonomics to attain remaining goals. []  See Plan of Care  []  See progress note/recertification  []  See Discharge Summary         Progress towards goals / Updated goals:  Short Term Goals: To be accomplished in 2 weeks:  1- Pt will be able to left shoulder AROM flex to 100 degrees in order to improve ADLs. Status at eval: AROM 45 degrees   Status at last progress noted (4/7/17): Progressing (Left shoulder AROM flexion 48 degrees)   Current status: Progressing (AROM left shoulder flexion: 72 degrees in sitting, 130 degrees in supine) 5/02/17       2-Pt will be independent with HEP to perform daily without increased pain. Status at eval: HEP initiated  Status at last progress noted (4/7/17): MET (pt reports 100% compliance and independence with HEP)   Current status: MET       3- Pt will increase strength to left shoulder flex/abd to >/= 3+/5 in order to reach without compensation. Status at eval: 3-/5   Status at last progress noted (4/7/17): No change (left shoulder flexion and abduction strength 3-/5)   Current status: no change (Strength shoulder flexion: 3-/5, Abduction: 3-/5) 5/02/17      Long Term Goals: To be accomplished in 4 weeks:  1- Pt will increase strength to Left UE to 4-/5 or greater in order to increase independence with ADLs/  Status at eval: 3-/5  Status at last progress noted (4/7/17): No change (left shoulder flexion and abduction strength 3-/5)   Current status: no change (Strength shoulder flexion: 3-/5, Abduction: 3-/5) 5/02/17      2- Pt will be able to put on a jacket independently and without compensation. Status at eval: 0%  Status at last progress noted (4/7/17): not assessed  Current status: patient demonstrating the ability to put on her jacket, goal met      3- Pt will increase FOTO to >/=66/100 to improve functional status.   Status at eval: 49/100  Status at last progress noted (4/7/17):not assessed   Current status: progressing (FOTO: 57/100) 5/02/17    PLAN  [] Upgrade activities as tolerated     []  Continue plan of care  []  Update interventions per flow sheet       []  Discharge due to:_  []  Other:_      Mackenzie Curry 5/12/2017  12:38 PM

## 2017-05-16 ENCOUNTER — HOSPITAL ENCOUNTER (OUTPATIENT)
Dept: PHYSICAL THERAPY | Age: 67
Discharge: HOME | End: 2017-05-16
Payer: MEDICARE

## 2017-05-16 PROCEDURE — 97140 MANUAL THERAPY 1/> REGIONS: CPT

## 2017-05-16 PROCEDURE — 97110 THERAPEUTIC EXERCISES: CPT

## 2017-05-16 NOTE — PROGRESS NOTES
PT DAILY TREATMENT NOTE - [facility] 3-16    Patient Name: Aaron Aceves  Date:2017  : 1950  [x]  Patient  Verified  Payor: Lissa  / Plan: [facility] / Product Type: Managed Care Medicare /    In time:105  Out time:145  Total Treatment Time (min): 40  Total Timed Codes (min): 40  1:1 Treatment Time ( [address] only): 30   Visit #: 13 of 24    Treatment Area: Left shoulder pain [M25.512]    SUBJECTIVE  Pain Level (0-10 scale): 10  Any medication changes, allergies to medications, adverse drug reactions, diagnosis change, or new procedure performed?: [x] No    [] Yes (see summary sheet for update)  Subjective functional status/changes:   [] No changes reported  \"My shoulder has been hurting more since Friday. It hurts under the arm. \"    OBJECTIVE      30 min Therapeutic Exercise:  [x] See flow sheet :supine ROM and facilitation of ER to inhibit subscapular m for reduction in pain/muscle tone   Rationale: increase ROM, increase strength and improve coordination to improve the patients ability to perform ADLs with less pain. 15 min Manual Therapy:   Focus on STM right subscapular region on left, assisted ROM, alternating holds at 90 deg Flex in supine for RC stability   Rationale: increase ROM, increase strength and improve coordination  to improve the patients ability to seasonal allergic rhinitis              With   [] TE   [] TA   [] neuro   [] other: Patient Education: [x] Review HEP    [] Progressed/Changed HEP based on:   [] positioning   [] body mechanics   [] transfers   [] heat/ice application    [] other:      Other Objective/Functional Measures:   TTP left subscapular m, mild pain with isometrics, focus on isometric ER for subscap inhibition   Patient was fitted and instructed in use of TENS/NMES for pain control and strengthening by rep after PT session     Pain Level (0-10 scale) post treatment: 1/10    ASSESSMENT/Changes in Function: Pt was greatly challenged by flex AAROM ex. Pt continues to attempt compensation with shoulder hike when performing AROM past 45 deg. Pt denied modalities post tx. Patient will continue to benefit from skilled PT services to modify and progress therapeutic interventions, address functional mobility deficits, address ROM deficits, address strength deficits, analyze and address soft tissue restrictions and analyze and modify body mechanics/ergonomics to attain remaining goals. [x]  See Plan of Care  []  See progress note/recertification  []  See Discharge Summary         Progress towards goals / Updated goals:  Short Term Goals: To be accomplished in 2 weeks:  1- Pt will be able to left shoulder AROM flex to 100 degrees in order to improve ADLs. Status at [facility]: AROM 45 degrees   Status at last progress noted (4/7/17): Progressing (Left shoulder AROM flexion 48 degrees)   Current status: active assisted left shoulder Flex to 130 in supine, progressing       2-Pt will be independent with HEP to perform daily without increased pain. Status at [facility]: HEP initiated  Status at last progress noted (4/7/17): MET (pt reports 100% compliance and independence with HEP)   Current status: MET       3- Pt will increase strength to left shoulder flex/abd to >/= 3+/5 in order to reach without compensation. Status at [facility]: 3-/5   Status at last progress noted (4/7/17): No change (left shoulder flexion and abduction strength 3-/5)   Current status: no change (Strength shoulder flexion: 3-/5, Abduction: 3-/5) 5/02/17      Long Term Goals: To be accomplished in 4 weeks:  1- Pt will increase strength to Left UE to 4-/5 or greater in order to increase independence with ADLs/  Status at [facility]: 3-/5  Status at last progress noted (4/7/17):  No change (left shoulder flexion and abduction strength 3-/5)   Current status: no change (Strength shoulder flexion: 3-/5, Abduction: 3-/5) 5/02/17      2- Pt will be able to put on a jacket independently and without compensation. Status at eval: 0%  Status at last progress noted (4/7/17): not assessed  Current status: patient demonstrating the ability to put on her jacket, goal met      3- Pt will increase FOTO to >/=66/100 to improve functional status.   Status at eval: 49/100  Status at last progress noted (4/7/17):not assessed   Current status: progressing (FOTO: 57/100) 5/02/17    PLAN  []  Upgrade activities as tolerated     [x]  Continue plan of care , reevaluate nV  []  Update interventions per flow sheet       []  Discharge due to:_  []  Other:_      Jenny Prasad, PT 5/16/2017  12:38 PM

## 2017-05-19 ENCOUNTER — APPOINTMENT (OUTPATIENT)
Dept: PHYSICAL THERAPY | Age: 67
End: 2017-05-19
Payer: MEDICARE

## 2017-05-23 ENCOUNTER — APPOINTMENT (OUTPATIENT)
Dept: PHYSICAL THERAPY | Age: 67
End: 2017-05-23
Payer: MEDICARE

## 2017-05-26 ENCOUNTER — HOSPITAL ENCOUNTER (OUTPATIENT)
Dept: PHYSICAL THERAPY | Age: 67
Discharge: HOME | End: 2017-05-26
Payer: MEDICARE

## 2017-05-26 PROCEDURE — 97110 THERAPEUTIC EXERCISES: CPT

## 2017-05-26 NOTE — PROGRESS NOTES
PT DAILY TREATMENT NOTE - [facility]     Patient Name: Yamilka Jones  Date:2017  : 1950  [x]  Patient  Verified  Payor: Naldo Basurto / Plan: [facility] / Product Type: Managed Care Medicare /    In time:1:04  Out time:2:05  Total Treatment Time (min): 61  Total Timed Codes (min): 61  1:1 Treatment Time ( [address] only): 23   Visit #: 14 of 24    Treatment Area: Left shoulder pain [M25.512]    SUBJECTIVE  Pain Level (0-10 scale): 0/10  Any medication changes, allergies to medications, adverse drug reactions, diagnosis change, or new procedure performed?: [x] No    [] Yes (see summary sheet for update)  Subjective functional status/changes:   [] No changes reported  \"I had a virus that kept me from coming to PT the last week. \"    OBJECTIVE    Modality rationale:    Min Type Additional Details    [] Estim:  []Unatt       []IFC  []Premod                        []Other:  []w/ice   []w/heat  Position:  Location:    [] Estim: []Att    []TENS instruct  []NMES                    []Other:  []w/US   []w/ice   []w/heat  Position:  Location:    []  Traction: [] Cervical       []Lumbar                       [] Prone          []Supine                       []Intermittent   []Continuous Lbs:  [] before manual  [] after manual    []  Ultrasound: []Continuous   [] Pulsed                           []1MHz   []3MHz W/cm2:  Location:    []  Iontophoresis with dexamethasone         Location: [] Take home patch   [] In clinic    []  Ice     []  heat  []  Ice massage  []  Laser   []  Anodyne Position:  Location:    []  Laser with stim  []  Other:  Position:  Location:    []  Vasopneumatic Device Pressure:       [] lo [] med [] hi   Temperature: [] lo [] med [] hi   [] Skin assessment post-treatment:  []intact []redness- no adverse reaction    []redness - adverse reaction:      min []Eval                  []Re-Eval       61 total  23 1:1 min Therapeutic Exercise:  [] See flow sheet :  Added scapular depression using red loop theraband, added finger ladder   Rationale: increase ROM, increase strength, improve coordination and increase proprioception to improve the patients ability to normalize ADLs. min Therapeutic Activity:  []  See flow sheet :         min Neuromuscular Re-education:  []  See flow sheet :        min Manual Therapy:          min Gait Training:  ___ feet with ___ device on level surfaces with ___ level of assist   Rationale: With   [] TE   [] TA   [] neuro   [] other: Patient Education: [x] Review HEP    [] Progressed/Changed HEP based on:   [] positioning   [] body mechanics   [] transfers   [] heat/ice application    [] other:      Other Objective/Functional Measures:   Pt able to reach step #29 using finger ladder. Pain Level (0-10 scale) post treatment: 0/10    ASSESSMENT/Changes in Function:    Pt returns to PT after being ill for a period of 10 days. Pt's left shoulder strength appears to be improving allowing her to raise her arm to at least 120 degrees flexion independently in supine position, but yellow resistance band does not allow pt to raise shoulder above 90 degrees in supine. Patient will continue to benefit from skilled PT services to modify and progress therapeutic interventions, address functional mobility deficits, address ROM deficits, address strength deficits, analyze and address soft tissue restrictions and analyze and modify body mechanics/ergonomics to attain remaining goals. []  See Plan of Care  []  See progress note/recertification  []  See Discharge Summary         Progress towards goals / Updated goals:  Short Term Goals: To be accomplished in 2 weeks:  1- Pt will be able to left shoulder AROM flex to 100 degrees in order to improve ADLs.   Status at eval: AROM 45 degrees   Status at last progress noted (4/7/17): Progressing (Left shoulder AROM flexion 48 degrees)   Current status: active assisted left shoulder Flex to 130 in supine, progressing       2-Pt will be independent with HEP to perform daily without increased pain. Status at eval: HEP initiated  Status at last progress noted (4/7/17): MET (pt reports 100% compliance and independence with HEP)   Current status: MET       3- Pt will increase strength to left shoulder flex/abd to >/= 3+/5 in order to reach without compensation. Status at eval: 3-/5   Status at last progress noted (4/7/17): No change (left shoulder flexion and abduction strength 3-/5)   Current status: no change (Strength shoulder flexion: 3-/5, Abduction: 3-/5) 5/02/17      Long Term Goals: To be accomplished in 4 weeks:  1- Pt will increase strength to Left UE to 4-/5 or greater in order to increase independence with ADLs/  Status at eval: 3-/5  Status at last progress noted (4/7/17): No change (left shoulder flexion and abduction strength 3-/5)   Current status: no change (Strength shoulder flexion: 3-/5, Abduction: 3-/5) 5/02/17      2- Pt will be able to put on a jacket independently and without compensation. Status at eval: 0%  Status at last progress noted (4/7/17): not assessed  Current status: patient demonstrating the ability to put on her jacket, goal met      3- Pt will increase FOTO to >/=66/100 to improve functional status.   Status at eval: 49/100  Status at last progress noted (4/7/17):not assessed   Current status: progressing (FOTO: 57/100) 5/02/17    PLAN  []  Upgrade activities as tolerated     []  Continue plan of care  []  Update interventions per flow sheet       []  Discharge due to:_  []  Other:_      Sathya Green PT 5/26/2017  1:09 PM    Future Appointments  Date Time Provider Kiki Carcamo   5/30/2017 10:00 AM ROWENA Landa [facility]   6/2/2017 10:00 AM ROWENA Landa THE [facility]

## 2017-05-30 ENCOUNTER — APPOINTMENT (OUTPATIENT)
Dept: PHYSICAL THERAPY | Age: 67
End: 2017-05-30
Payer: MEDICARE

## 2017-06-02 ENCOUNTER — HOSPITAL ENCOUNTER (OUTPATIENT)
Dept: PHYSICAL THERAPY | Age: 67
Discharge: HOME | End: 2017-06-02
Payer: MEDICARE

## 2017-06-02 PROCEDURE — G8984 CARRY CURRENT STATUS: HCPCS

## 2017-06-02 PROCEDURE — G8985 CARRY GOAL STATUS: HCPCS

## 2017-06-02 PROCEDURE — 97110 THERAPEUTIC EXERCISES: CPT

## 2017-06-02 NOTE — PROGRESS NOTES
PT DAILY TREATMENT NOTE - [facility]     Patient Name: Betty Wayne  Date:2017  : 1950  [x]  Patient  Verified  Payor: Dario Correa / Plan: [facility] / Product Type: Managed Care Medicare /    In time:10:20  Out time:11:15  Total Treatment Time (min): 55  Total Timed Codes (min): 55  1:1 Treatment Time ( [address] only): 54   Visit #: 15 of 24    Treatment Area: Left shoulder pain [M25.512]    SUBJECTIVE  Pain Level (0-10 scale): 0/10  Any medication changes, allergies to medications, adverse drug reactions, diagnosis change, or new procedure performed?: [x] No    [] Yes (see summary sheet for update)  Subjective functional status/changes:   [] No changes reported  \"I can push a full cart of groceries with my left arm. I'm doing 90% of my normal activities. I'm not able to reach high shelves, but I couldn't do that before either.   Pain 1-2/10 at worst.\"    OBJECTIVE    Modality rationale:    Min Type Additional Details    [] Estim:  []Unatt       []IFC  []Premod                        []Other:  []w/ice   []w/heat  Position:  Location:    [] Estim: []Att    []TENS instruct  []NMES                    []Other:  []w/US   []w/ice   []w/heat  Position:  Location:    []  Traction: [] Cervical       []Lumbar                       [] Prone          []Supine                       []Intermittent   []Continuous Lbs:  [] before manual  [] after manual    []  Ultrasound: []Continuous   [] Pulsed                           []1MHz   []3MHz W/cm2:  Location:    []  Iontophoresis with dexamethasone         Location: [] Take home patch   [] In clinic    []  Ice     []  heat  []  Ice massage  []  Laser   []  Anodyne Position:  Location:    []  Laser with stim  []  Other:  Position:  Location:    []  Vasopneumatic Device Pressure:       [] lo [] med [] hi   Temperature: [] lo [] med [] hi   [] Skin assessment post-treatment:  []intact []redness- no adverse reaction    []redness - adverse reaction:      min []Eval                  []Re-Eval       55 min Therapeutic Exercise:  [x] See flow sheet :  Reviewed HEP and discussed modification of exercises for HEP; increased resistance of scapular depressions to green theraband   Rationale: increase ROM, increase strength, improve coordination and increase proprioception to improve the patients ability to normalize ADLs. min Therapeutic Activity:  []  See flow sheet :         min Neuromuscular Re-education:  []  See flow sheet :        min Manual Therapy:          min Gait Training:  ___ feet with ___ device on level surfaces with ___ level of assist   Rationale: With   [] TE   [] TA   [] neuro   [] other: Patient Education: [x] Review HEP    [] Progressed/Changed HEP based on:   [] positioning   [] body mechanics   [] transfers   [] heat/ice application    [] other:      Other Objective/Functional Measures:   AROM shoulder flexion: 70 degrees  Strength left shoulder flexion: 3-/5  Abduction: 3-/5    Pain Level (0-10 scale) post treatment: 0/10    ASSESSMENT/Changes in Function:    Pt's AROM and strength of the left UE continues to be limited and not [facility] although pt reports that she can perform 90% of her normal activities by using her right UE. Her AROM left shoulder flexion is currently 70 degrees and strength of her left shoulder is grossly 3-/5. Most of her ADLs are below shoulder level allowing her to currently perform at least 90% of her normal ADLs. She is compliant with HEP and has desired to decreased frequency of PT treatments to 1x per week for mostly monitoring progress and progression of strengthening exercises as tolerated. Pt would benefit from additional skilled PT to address strength and functional deficits.       Patient will continue to benefit from skilled PT services to modify and progress therapeutic interventions, address functional mobility deficits, address ROM deficits, address strength deficits, analyze and address soft tissue restrictions and analyze and modify body mechanics/ergonomics to attain remaining goals. []  See Plan of Care  []  See progress note/recertification  []  See Discharge Summary         Progress towards goals / Updated goals:  Short Term Goals: To be accomplished in 2 weeks:  1- Pt will be able to left shoulder AROM flex to 100 degrees in order to improve ADLs. Status at eval: AROM 45 degrees   Status at last progress noted (4/25/17): Progressing (AROM left shoulder flexion: 72 degrees in sitting, 130 degrees in supine)  Current status: progressing (AROM shoulder flexion: 70 degrees, AAROM left shoulder flexion: 130 degrees in supine) 6/02/17      2-Pt will be independent with HEP to perform daily without increased pain. Status at eval: HEP initiated  Status at last progress noted (4/25/17): MET (pt reports 100% compliance and independence with HEP)   Current status: MET       3- Pt will increase strength to left shoulder flex/abd to >/= 3+/5 in order to reach without compensation. Status at eval: 3-/5   Status at last progress noted (4/25/17): No change (left shoulder flexion and abduction strength 3-/5)   Current status: no change (Strength shoulder flexion: 3-/5, Abduction: 3-/5) 6/02/17      Long Term Goals: To be accomplished in 4 weeks:  1- Pt will increase strength to Left UE to 4-/5 or greater in order to increase independence with ADLs/  Status at eval: 3-/5  Status at last progress noted (4/25/17): No change (left shoulder flexion and abduction strength 3-/5)   Current status: no change (Strength shoulder flexion: 3-/5, Abduction: 3-/5) 6/02/17      2- Pt will be able to put on a jacket independently and without compensation. Status at eval: 0%  Status at last progress noted (4/25/17): unable  Current status: patient demonstrating the ability to put on her jacket, goal met      3- Pt will increase FOTO to >/=66/100 to improve functional status.   Status at eval: 49/100  Status at last progress noted (4/25/17): progressing (FOTO: 57/100)  Current status: progressing (FOTO: 61/100) 6/02/17    PLAN  []  Upgrade activities as tolerated     []  Continue plan of care  []  Update interventions per flow sheet       []  Discharge due to:_  []  Other:_  AROM shoulder flexion: 60 degrees    Glenna Nails, PT 6/2/2017  10:35 AM    No future appointments.

## 2017-06-02 NOTE — PROGRESS NOTES
[facility] at [facility]. Jus Paez, [address]  Phone: [phone] Fax: [phone]    Continued Plan of Care/ Re-certification for Physical Therapy Services    Patient name: Jo Ann Groves Start of Care: 3/27/17   Referral source: Tri Valdez MD : 1950   Medical/Treatment Diagnosis: Left shoulder pain [M25.512] S/P reverse total shoulder replacement Onset Date: 17     Prior Hospitalization: see medical history Provider#: [id number]   Medications: Verified on Patient Summary List    Comorbidities: Bilateral TKR, Spinal Stenosis, depression, osteoporosis, DM, arthritis, asthma, Hx CA  Prior Level of Function:Patient notes she lives alone and has been in motorized w/c for 12 years, but able to take care of herself  Visits from Start of Care: 15    Missed Visits: 4    The Plan of Care and following information is based on the patient's current status:  Short Term Goals: To be accomplished in 2 weeks:  1- Pt will be able to left shoulder AROM flex to 100 degrees in order to improve ADLs. Status at eval: AROM 45 degrees   Status at last progress noted (17): Progressing (AROM left shoulder flexion: 72 degrees in sitting, 130 degrees in supine)  Current status: progressing (AROM shoulder flexion: 70 degrees, AAROM left shoulder flexion: 130 degrees in supine)      2-Pt will be independent with HEP to perform daily without increased pain. Status at eval: HEP initiated  Status at last progress noted (17): MET (pt reports 100% compliance and independence with HEP)   Current status: MET       3- Pt will increase strength to left shoulder flex/abd to >/= 3+/5 in order to reach without compensation. Status at eval: 3-/5   Status at last progress noted (17): No change (left shoulder flexion and abduction strength 3-/5)   Current status: no change (Strength shoulder flexion: 3-/5, Abduction: 3-/5)      Long Term Goals:  To be accomplished in 4 weeks:  1- Pt will increase strength to Left UE to 4-/5 or greater in order to increase independence with ADLs/  Status at eval: 3-/5  Status at last progress noted (4/25/17): No change (left shoulder flexion and abduction strength 3-/5)   Current status: no change (Strength shoulder flexion: 3-/5, Abduction: 3-/5)      2- Pt will be able to put on a jacket independently and without compensation. Status at eval: 0%  Status at last progress noted (4/25/17): unable  Current status: patient demonstrating the ability to put on her jacket, goal met      3- Pt will increase FOTO to >/=66/100 to improve functional status. Status at eval: 49/100  Status at last progress noted (4/25/17): progressing (FOTO: 57/100)  Current status: progressing (FOTO: 61/100)    Key functional changes:     Pt's AROM and strength of the left UE continues to be limited and not [facility] although pt reports that she can perform 90% of her normal activities by using her right UE. Her AROM left shoulder flexion is currently 70 degrees and strength of her left shoulder is grossly 3-/5. Most of her ADLs are below shoulder level allowing her to currently perform at least 90% of her normal ADLs. She is compliant with HEP and has desired to decreased frequency of PT treatments to 1x per week for mostly monitoring progress and progression of strengthening exercises as tolerated. Problems/ barriers to goal attainment: left shoulder weakness      Problem List: decrease ROM and decrease strength     Treatment Plan: Therapeutic exercise, Therapeutic activities, Neuromuscular re-education, Physical agent/modality, Manual therapy and Patient education     Patient Goal (s) has been updated and includes: \"Move my arm better\"     Goals for this certification period to be accomplished in 4 weeks:   1) Continue with unmet goals above.     Frequency / Duration: Patient to be seen 1 times per week for 4 weeks:    Assessment / Recommendations:   Pt would benefit from additional skilled PT to address strength and functional deficits. G-Codes (GP)  Carry   Current  CJ= 20-39%   [id number] Goal  CJ= 20-39%  The severity rating is based on clinical judgment and the FOTO score. Certification Period: 4/27/17 to 6/26/17    Graydon Lesches, PT 6/2/2017 4:26 PM    ________________________________________________________________________  I certify that the above Therapy Services are being furnished while the patient is under my care. I agree with the treatment plan and certify that this therapy is necessary. [] I have read the above and request that my patient continue as recommended. [] I have read the above report and request that my patient continue therapy with the following changes/special instructions: _______________________________________  [] I have read the above report and request that my patient be discharged from therapy    Physician's Signature:________________________________Date:___________Time:__________    Please sign and return to [facility] at [facility].  John Patterson [address]  Phone: [phone] Fax: [phone]

## 2017-06-09 ENCOUNTER — HOSPITAL ENCOUNTER (OUTPATIENT)
Dept: PHYSICAL THERAPY | Age: 67
Discharge: HOME | End: 2017-06-09
Payer: MEDICARE

## 2017-06-09 PROCEDURE — 97110 THERAPEUTIC EXERCISES: CPT

## 2017-06-09 NOTE — PROGRESS NOTES
PT DAILY TREATMENT NOTE - [facility]     Patient Name: Ari Carrillo  Date:2017  : 1950  [x]  Patient  Verified  Payor: Melody Mention / [place] [address] [place] [address] / Product Type: Managed Care Medicare /    In time:11:18  Out time:12:00  Total Treatment Time (min): 42  Total Timed Codes (min): 42  1:1 Treatment Time ( [address] only): 30   Visit #: 16 of 19    Treatment Area: Left shoulder pain [M25.512]    SUBJECTIVE  Pain Level (0-10 scale): 0/10  Any medication changes, allergies to medications, adverse drug reactions, diagnosis change, or new procedure performed?: [x] No    [] Yes (see summary sheet for update)  Subjective functional status/changes:   [] No changes reported  \"I am able to lift my arm when I head is elevated in my hospital bed at home. \"    OBJECTIVE    Modality rationale:    Min Type Additional Details    [] Estim:  []Unatt       []IFC  []Premod                        []Other:  []w/ice   []w/heat  Position:  Location:    [] Estim: []Att    []TENS instruct  []NMES                    []Other:  []w/US   []w/ice   []w/heat  Position:  Location:    []  Traction: [] Cervical       []Lumbar                       [] Prone          []Supine                       []Intermittent   []Continuous Lbs:  [] before manual  [] after manual    []  Ultrasound: []Continuous   [] Pulsed                           []1MHz   []3MHz W/cm2:  Location:    []  Iontophoresis with dexamethasone         Location: [] Take home patch   [] In clinic    []  Ice     []  heat  []  Ice massage  []  Laser   []  Anodyne Position:  Location:    []  Laser with stim  []  Other:  Position:  Location:    []  Vasopneumatic Device Pressure:       [] lo [] med [] hi   Temperature: [] lo [] med [] hi   [] Skin assessment post-treatment:  []intact []redness- no adverse reaction    []redness - adverse reaction:      min []Eval                  []Re-Eval       42 total  30 1:1 min Therapeutic Exercise:  [] See flow sheet : Rationale: increase ROM, increase strength, improve coordination and increase proprioception to improve the patients ability to normalize ADLs. min Therapeutic Activity:  []  See flow sheet :         min Neuromuscular Re-education:  []  See flow sheet :        min Manual Therapy:          min Gait Training:  ___ feet with ___ device on level surfaces with ___ level of assist   Rationale: With   [] TE   [] TA   [] neuro   [] other: Patient Education: [x] Review HEP    [] Progressed/Changed HEP based on:   [] positioning   [] body mechanics   [] transfers   [] heat/ice application    [] other:      Other Objective/Functional Measures:   Pt was 10 minutes late for appointment. AROM left shoulder flexion: 70 degrees    Pain Level (0-10 scale) post treatment: 0/10    ASSESSMENT/Changes in Function:    Pt performing all exercises requiring only occasional verbal cues during scapular depressions and biceps curls to properly perform exercises. Strength improving. Patient will continue to benefit from skilled PT services to modify and progress therapeutic interventions, address functional mobility deficits, address ROM deficits, address strength deficits, analyze and address soft tissue restrictions and analyze and modify body mechanics/ergonomics to attain remaining goals. []  See Plan of Care  []  See progress note/recertification  []  See Discharge Summary         Progress towards goals / Updated goals:  Short Term Goals: To be accomplished in 2 weeks:  1- Pt will be able to left shoulder AROM flex to 100 degrees in order to improve ADLs. Status at [facility]: AROM 45 degrees   Status at last progress noted (6/02/17): progressing (AROM shoulder flexion: 70 degrees, AAROM left shoulder flexion: 130 degrees in supine)  Current status: not reassessed      2-Pt will be independent with HEP to perform daily without increased pain.   Status at [facility]: HEP initiated  Status at last progress noted (6/02/17): MET (pt reports 100% compliance and independence with HEP)      3- Pt will increase strength to left shoulder flex/abd to >/= 3+/5 in order to reach without compensation. Status at eval: 3-/5   Status at last progress noted (6/02/17): no change (Strength shoulder flexion: 3-/5, Abduction: 3-/5)  Current status: not reassessed      Long Term Goals: To be accomplished in 4 weeks:  1- Pt will increase strength to Left UE to 4-/5 or greater in order to increase independence with ADLs/  Status at eval: 3-/5  Status at last progress noted (6/02/17):  no change (Strength shoulder flexion: 3-/5, Abduction: 3-/5)  Current status: not reassessed      2- Pt will be able to put on a jacket independently and without compensation. Status at eval: 0%  Status at last progress noted (6/02/17): met (patient demonstrating the ability to put on her jacket)      3- Pt will increase FOTO to >/=66/100 to improve functional status.   Status at eval: 49/100  Status at last progress noted (6/02/17):  progressing (FOTO: 61/100)  Current status: not reassessed    PLAN  []  Upgrade activities as tolerated     [x]  Continue plan of care  []  Update interventions per flow sheet       []  Discharge due to:_  []  Other:_      Juan C Joseph PT 6/9/2017  11:20 AM    Future Appointments  Date Time Provider Kiki Carcamo   6/16/2017 10:30 AM ROWENA Rich [facility]   6/23/2017 11:00 AM ROWENA Rich THE [facility]

## 2017-06-16 ENCOUNTER — HOSPITAL ENCOUNTER (OUTPATIENT)
Dept: PHYSICAL THERAPY | Age: 67
Discharge: HOME | End: 2017-06-16
Payer: MEDICARE

## 2017-06-16 PROCEDURE — 97110 THERAPEUTIC EXERCISES: CPT

## 2017-06-16 NOTE — PROGRESS NOTES
PT DAILY TREATMENT NOTE - [facility]     Patient Name: Mark Anthony Elena  Date:2017  : 1950  [x]  Patient  Verified  Payor: Chong Sanchez / Plan: [facility] / Product Type: Managed Care Medicare /    In time:10:35  Out time:11:20  Total Treatment Time (min): 45  Total Timed Codes (min): 35  1:1 Treatment Time ( [address] only): 20   Visit #: 17 of 19    Treatment Area: Left shoulder pain [M25.512]    SUBJECTIVE  Pain Level (0-10 scale): 3-4/10  Any medication changes, allergies to medications, adverse drug reactions, diagnosis change, or new procedure performed?: [x] No    [] Yes (see summary sheet for update)  Subjective functional status/changes:   [] No changes reported  \"My shoulder has been hurting me since I did the finger ladder last visit. \"    OBJECTIVE    Modality rationale: decrease pain to improve the patients ability to tolerate positions and ADLs.    Min Type Additional Details    [] Estim:  []Unatt       []IFC  []Premod                        []Other:  []w/ice   []w/heat  Position:  Location:    [] Estim: []Att    []TENS instruct  []NMES                    []Other:  []w/US   []w/ice   []w/heat  Position:  Location:    []  Traction: [] Cervical       []Lumbar                       [] Prone          []Supine                       []Intermittent   []Continuous Lbs:  [] before manual  [] after manual    []  Ultrasound: []Continuous   [] Pulsed                           []1MHz   []3MHz W/cm2:  Location:    []  Iontophoresis with dexamethasone         Location: [] Take home patch   [] In clinic   10 [x]  Ice     []  heat  []  Ice massage  []  Laser   []  Anodyne Position: supine  Location: applied to shoulder    []  Laser with stim  []  Other:  Position:  Location:    []  Vasopneumatic Device Pressure:       [] lo [] med [] hi   Temperature: [] lo [] med [] hi   [] Skin assessment post-treatment:  []intact []redness- no adverse reaction    []redness - adverse reaction: min []Eval                  []Re-Eval       35 total  20 1:1 min Therapeutic Exercise:  [] See flow sheet :   Rationale: increase ROM, increase strength, improve coordination and increase proprioception to improve the patients ability to normalize ADLs. min Therapeutic Activity:  []  See flow sheet :         min Neuromuscular Re-education:  []  See flow sheet :        min Manual Therapy:          min Gait Training:  ___ feet with ___ device on level surfaces with ___ level of assist   Rationale: With   [] TE   [] TA   [] neuro   [] other: Patient Education: [x] Review HEP    [] Progressed/Changed HEP based on:   [] positioning   [] body mechanics   [] transfers   [] heat/ice application    [] other:      Other Objective/Functional Measures:   No objective measures taken today. Pain Level (0-10 scale) post treatment: 1/10    ASSESSMENT/Changes in Function:    Pt reported that finger ladder hurt her shoulder last treatment and pain persisted through today, but when pt performed exercises today pain diminished. Pt shows ability to raise right shoulder to greater than 150 degrees independently in supine position. Pt is independent with exercises. Patient will continue to benefit from skilled PT services to modify and progress therapeutic interventions, address functional mobility deficits, address ROM deficits, address strength deficits, analyze and address soft tissue restrictions and analyze and modify body mechanics/ergonomics to attain remaining goals. []  See Plan of Care  []  See progress note/recertification  []  See Discharge Summary         Progress towards goals / Updated goals:  Short Term Goals: To be accomplished in 2 weeks:  1- Pt will be able to left shoulder AROM flex to 100 degrees in order to improve ADLs.   Status at eval: AROM 45 degrees   Status at last progress noted (6/02/17): progressing (AROM shoulder flexion: 70 degrees, AAROM left shoulder flexion: 130 degrees in supine)  Current status: not reassessed      2-Pt will be independent with HEP to perform daily without increased pain. Status at eval: HEP initiated  Status at last progress noted (6/02/17): MET (pt reports 100% compliance and independence with HEP)      3- Pt will increase strength to left shoulder flex/abd to >/= 3+/5 in order to reach without compensation. Status at eval: 3-/5   Status at last progress noted (6/02/17): no change (Strength shoulder flexion: 3-/5, Abduction: 3-/5)  Current status: not reassessed      Long Term Goals: To be accomplished in 4 weeks:  1- Pt will increase strength to Left UE to 4-/5 or greater in order to increase independence with ADLs/  Status at eval: 3-/5  Status at last progress noted (6/02/17): no change (Strength shoulder flexion: 3-/5, Abduction: 3-/5)  Current status: not reassessed      2- Pt will be able to put on a jacket independently and without compensation. Status at eval: 0%  Status at last progress noted (6/02/17): met (patient demonstrating the ability to put on her jacket)      3- Pt will increase FOTO to >/=66/100 to improve functional status.   Status at eval: 49/100  Status at last progress noted (6/02/17): progressing (FOTO: 61/100)  Current status: not reassessed    PLAN  [x]  Upgrade activities as tolerated     [x]  Continue plan of care  []  Update interventions per flow sheet       []  Discharge due to:_  []  Other:_      Jayro Myers PT 6/16/2017  5:17 PM    Future Appointments  Date Time Provider Kiki Carcamo   6/23/2017 11:00 AM Jayro Myers PT [facility]

## 2017-06-23 ENCOUNTER — HOSPITAL ENCOUNTER (OUTPATIENT)
Dept: PHYSICAL THERAPY | Age: 67
Discharge: HOME | End: 2017-06-23
Payer: MEDICARE

## 2017-06-23 PROCEDURE — G8985 CARRY GOAL STATUS: HCPCS

## 2017-06-23 PROCEDURE — G8986 CARRY D/C STATUS: HCPCS

## 2017-06-23 PROCEDURE — 97110 THERAPEUTIC EXERCISES: CPT

## 2017-06-23 NOTE — PROGRESS NOTES
PT DAILY TREATMENT NOTE - [facility]     Patient Name: Olga Barclay  Date:2017  : 1950  [x]  Patient  Verified  Payor: Blanca Zavala / Plan: [facility] / Product Type: Managed Care Medicare /    In time:11:05  Out time:11:45  Total Treatment Time (min): 40  Total Timed Codes (min): 40  1:1 Treatment Time ( [address] only): 25   Visit #: 18 of 19    Treatment Area: Left shoulder pain [M25.512]    SUBJECTIVE  Pain Level (0-10 scale): 0/10  Any medication changes, allergies to medications, adverse drug reactions, diagnosis change, or new procedure performed?: [x] No    [] Yes (see summary sheet for update)  Subjective functional status/changes:   [] No changes reported  'I was sitting up and washed my hair with both hands. Pain 0/10 at worst during ADLs.     OBJECTIVE    Modality rationale:    Min Type Additional Details    [] Estim:  []Unatt       []IFC  []Premod                        []Other:  []w/ice   []w/heat  Position:  Location:    [] Estim: []Att    []TENS instruct  []NMES                    []Other:  []w/US   []w/ice   []w/heat  Position:  Location:    []  Traction: [] Cervical       []Lumbar                       [] Prone          []Supine                       []Intermittent   []Continuous Lbs:  [] before manual  [] after manual    []  Ultrasound: []Continuous   [] Pulsed                           []1MHz   []3MHz W/cm2:  Location:    []  Iontophoresis with dexamethasone         Location: [] Take home patch   [] In clinic    []  Ice     []  heat  []  Ice massage  []  Laser   []  Anodyne Position:  Location:    []  Laser with stim  []  Other:  Position:  Location:    []  Vasopneumatic Device Pressure:       [] lo [] med [] hi   Temperature: [] lo [] med [] hi   [] Skin assessment post-treatment:  []intact []redness- no adverse reaction    []redness - adverse reaction:      min []Eval                  []Re-Eval       40 total  25 1:1 min Therapeutic Exercise:  [x] See flow sheet :  Reviewed HEP and discussed progression of HEP after D/C   Rationale: increase ROM, increase strength, improve coordination and increase proprioception to improve the patients ability to normalize ADLs. min Therapeutic Activity:  []  See flow sheet :         min Neuromuscular Re-education:  []  See flow sheet :        min Manual Therapy:          min Gait Training:  ___ feet with ___ device on level surfaces with ___ level of assist   Rationale: With   [] TE   [] TA   [] neuro   [] other: Patient Education: [x] Review HEP    [] Progressed/Changed HEP based on:   [] positioning   [] body mechanics   [] transfers   [] heat/ice application    [] other:      Other Objective/Functional Measures:   AROM left shoulder flexion: 85 degrees  Abduction: 65 degrees  Shoulder extension: 48 degrees  ER: left UT level  IR: L4 level  Strength left shoulder flexion: 3-/5 (no change)  Shoulder extension: 4+/5  Abduction: 3/5 (improved 1/3 grade)  ER: 4-/5 (improved 1 grade)  IR: 4+/5 (improved 1 1/nd2ndgndrndanddndend)  Elbow flexion: 4+/5 (improved 1 grade)  Elbow extension: 4/5 (improved 2/3 grade)  FOTO: 65/100     Pain Level (0-10 scale) post treatment: 0/10    ASSESSMENT/Changes in Function:    Pt has displayed improved strength of the left UE since her last progress note was issued on 6/02/17 demonstrating her ability to improve by just performing her HEP. Pt's PT treatment frequency was decreased to 1x per week for the last 3-4 weeks to monitor pt to see if she required additional skilled PT to progress. Pt reports being able to wash her hair and raise her left arm to levels that allow her to perform 90% of the activities she needs to do at home. Pain not occurring during ADLs.     []  See Plan of Care  []  See progress note/recertification  []  See Discharge Summary      Progress towards goals / Updated goals:  Short Term Goals:  To be accomplished in 2 weeks:  1- Pt will be able to left shoulder AROM flex to 100 degrees in order to improve ADLs. Status at eval: AROM 45 degrees   Status at last progress noted (6/02/17): progressing (AROM shoulder flexion: 70 degrees, AAROM left shoulder flexion: 130 degrees in supine)  Current status: progressing (AROM left shoulder flexion: 85 degrees) 6/23/17      2-Pt will be independent with HEP to perform daily without increased pain. Status at eval: HEP initiated  Status at last progress noted (6/02/17): MET (pt reports 100% compliance and independence with HEP)      3- Pt will increase strength to left shoulder flex/abd to >/= 3+/5 in order to reach without compensation. Status at eval: 3-/5   Status at last progress noted (6/02/17): no change (Strength shoulder flexion: 3-/5, Abduction: 3-/5)  Current status: progressing for abduction, unchanged for shoulder flexion (Strength left shoulder flexion: 3-/5 (no change), Shoulder extension: 4+/5, Abduction: 3/5 (improved 1/3 grade), ER: 4-/5 (improved 1 grade), IR: 4+/5 (improved 1 1/th4thgthrthathdtheth), Elbow flexion: 4+/5 (improved 1 grade), Elbow extension: 4/5 (improved 2/3 grade)) 6/23/17      Long Term Goals: To be accomplished in 4 weeks:  1- Pt will increase strength to Left UE to 4-/5 or greater in order to increase independence with ADLs/  Status at eval: 3-/5  Status at last progress noted (6/02/17): no change (Strength shoulder flexion: 3-/5, Abduction: 3-/5)  Current status:  progressing (Strength left shoulder flexion: 3-/5 (no change), Shoulder extension: 4+/5, Abduction: 3/5 (improved 1/3 grade), ER: 4-/5 (improved 1 grade), IR: 4+/5 (improved 1 1/th4thgthrthathdtheth), Elbow flexion: 4+/5 (improved 1 grade), Elbow extension: 4/5 (improved 2/3 grade)) 6/23/17      2- Pt will be able to put on a jacket independently and without compensation. Status at eval: 0%  Status at last progress noted (6/02/17): met (patient demonstrating the ability to put on her jacket)      3- Pt will increase FOTO to >/=66/100 to improve functional status.   Status at eval: 49/100  Status at last progress noted (6/02/17): progressing (FOTO: 61/100)  Current status: nearly met (FOTO: 65/100) 6/23/17     PLAN  []  Upgrade activities as tolerated     []  Continue plan of care  []  Update interventions per flow sheet       [x]  Discharge due to: completion of program; pt to continue with HEP independently after discharge. []  Other:_      Raul Ochoa, PT 6/23/2017  11:07 AM    No future appointments.

## 2017-06-23 NOTE — PROGRESS NOTES
[facility] [address] [place] [place], [facility] [place]  Phone: [phone]      Fax:  [phone]    Discharge Summary      Patient name: Erin Morales          Start of Care: 3/27/17  Referral source: Tamanna Valadez MD         : 1950  Medical/Treatment Diagnosis: Left shoulder pain [M25.512] S/P reverse total shoulder replacement Onset Date: 17  Prior Hospitalization: see medical history         Provider#: [id number]  Comorbidities: Bilateral TKR, Spinal Stenosis, depression, osteoporosis, DM, arthritis, asthma, Hx CA  Prior Level of Function:Patient notes she lives alone and has been in motorized w/c for 12 years, but able to take care of herself  Medications: Verified on Patient Summary List  Visits from Start of Care: 18          Missed Visits: 5  Reporting Period : 17 to 17    Short Term Goals: To be accomplished in 2 weeks:  1) Pt will be able to left shoulder AROM flex to 100 degrees in order to improve ADLs. Status at eval: AROM 45 degrees   Status at last progress noted (17): progressing (AROM shoulder flexion: 70 degrees, AAROM left shoulder flexion: 130 degrees in supine)  Current status: progressing (AROM left shoulder flexion: 85 degrees)      2) Pt will be independent with HEP to perform daily without increased pain. Status at eval: HEP initiated  Status at last progress noted (17): MET (pt reports 100% compliance and independence with HEP)      3) Pt will increase strength to left shoulder flex/abd to >/= 3+/5 in order to reach without compensation.   Status at eval: 3-/5   Status at last progress noted (17): no change (Strength shoulder flexion: 3-/5, Abduction: 3-/5)  Current status: progressing for abduction, unchanged for shoulder flexion (Strength left shoulder flexion: 3-/5 (no change), Shoulder extension: 4+/5, Abduction: 3/5 (improved 1/3 grade), ER: 4-/5 (improved 1 grade), IR: 4+/5 (improved 1 1/3 grade), Elbow flexion: 4+/5 (improved 1 grade), Elbow extension: 4/5 (improved 2/3 grade))      Long Term Goals: To be accomplished in 4 weeks:  1) Pt will increase strength to Left UE to 4-/5 or greater in order to increase independence with ADLs/  Status at eval: 3-/5  Status at last progress noted (6/02/17): no change (Strength shoulder flexion: 3-/5, Abduction: 3-/5)  Current status:  progressing (Strength left shoulder flexion: 3-/5 (no change), Shoulder extension: 4+/5, Abduction: 3/5 (improved 1/3 grade), ER: 4-/5 (improved 1 grade), IR: 4+/5 (improved 1 1/nd2ndgndrndanddndend), Elbow flexion: 4+/5 (improved 1 grade), Elbow extension: 4/5 (improved 2/3 grade))      2) Pt will be able to put on a jacket independently and without compensation. Status at eval: 0%  Status at last progress noted (6/02/17): met (patient demonstrating the ability to put on her jacket)      3) Pt will increase FOTO to >/=66/100 to improve functional status. Status at eval: 49/100  Status at last progress noted (6/02/17): progressing (FOTO: 61/100)  Current status: nearly met (FOTO: 65/100)    Assessment/ Summary of Care:    Pt has displayed improved strength of the left UE since her last progress note was issued on 6/02/17 demonstrating her ability to improve by just performing her HEP. Pt's PT treatment frequency was decreased to 1x per week for the last 3-4 weeks to monitor pt to see if she required additional skilled PT to progress. Pt reports being able to wash her hair and raise her left arm to levels that allow her to perform 90% of the activities she needs to do at home. Pain not occurring during ADLs. RECOMMENDATIONS:  [x]Discontinue therapy: [x]Patient is progressing toward set goals      []Patient is non-compliant or has abdicated      []Due to lack of appreciable progress towards set goals    Carry    Goal  CJ= 20-39%   D/C  CJ= 20-39%  The severity rating is based on clinical judgment and the FOTO score.     Certification Period: 6/02/17 to 6/26/17   Eal Cruz, PT 6/23/2017 12:13 PM

## 2017-11-02 NOTE — ROUTINE PROCESS
After Visit Summary   11/2/2017    Trisha Lucia    MRN: [id number]           Patient Information     Date Of Birth          1933        Visit Information        Provider Department      11/2/2017 12:50 PM Marylin Moralez PA [facility]        Today's Diagnoses     SONYA (generalized anxiety disorder)    -  1       Follow-ups after your visit        Your next 10 appointments already scheduled     Dec 28, 2017 10:30 AM CST   Office Visit with Judith Walker MD   [facility] ([facility])    [address]  [address]  [facility] [id number] [phone]              Who to contact     If you have questions or need follow up information about today's clinic visit or your schedule please contact [facility] directly at [phone].  Normal or non-critical lab and imaging results will be communicated to you by MyChart, letter or phone within 4 business days after the clinic has received the results. If you do not hear from us within 7 days, please contact the clinic through PromoJam[employer] or phone. If you have a critical or abnormal lab result, we will notify you by phone as soon as possible.  Submit refill requests through GiveLoop or call your pharmacy and they will forward the refill request to us. Please allow 3 business days for your refill to be completed.          Additional Information About Your Visit        MyChart Information     GiveLoop gives you secure access to your electronic health record. If you see a primary care provider, you can also send messages to your care team and make appointments. If you have questions, please call your primary care clinic.  If you do not have a primary care provider, please call [phone] and they will assist you.        Care EveryWhere ID     This is your Care EveryWhere ID. This could be used by other organizations to access your [facility]  Bedside shift change report given to Fatoumata Bucio RN (oncoming nurse) by Rik Velazquez RN   (offgoing nurse). Report included the following information SBAR, Kardex, Intake/Output and MAR. records  [id number]        Your Vitals Were     Pulse Temperature Pulse Oximetry Breastfeeding? BMI (Body Mass Index)       34 97.9  F (36.6  C) (Oral) 97% No 33.57 kg/m2        Blood Pressure from Last 3 Encounters:   11/02/17 126/60   08/10/17 136/60   06/09/17 118/70    Weight from Last 3 Encounters:   11/02/17 81.9 kg (180 lb 9.6 oz)   08/10/17 82.1 kg (181 lb)   04/26/17 80.7 kg (178 lb)              Today, you had the following     No orders found for display       Primary Care Provider Office Phone # Fax #    Judith Walker [phone] [address] [address]        Equal Access to Services     [facility]: Hadii sen howe hadasho Soomaali, waaxda luqadaha, qaybta kaalmada adejayeshyarobby, raul ocampo . So [facility] [place] [facility] [phone].    ATENCIÓN: Si habla español, tiene a duarte disposición servicios gratuitos de asistencia lingüística. Alee al [phone].    We comply with applicable federal civil rights laws and [place] laws. We do not discriminate on the basis of race, color, national origin, age, disability, sex, sexual orientation, or gender identity.            Thank you!     Thank you for choosing [facility]  for your care. Our goal is always to provide you with excellent care. Hearing back from our patients is one way we can continue to improve our services. Please take a few minutes to complete the written survey that you may receive in the mail after your visit with us. Thank you!             Your Updated Medication List - Protect others around you: Learn how to safely use, store and throw away your medicines at www.disposemymeds.org.          This list is accurate as of: 11/2/17  6:46 PM.  Always use your most recent med list.                   Brand Name Dispense Instructions for use Diagnosis    aspirin 81 MG EC tablet     90 tablet    Take 1 tablet by mouth daily.        citalopram 20 MG tablet    celeXA     90 tablet    TAKE ONE TABLET BY MOUTH ONE TIME DAILY    Peripheral polyneuropathy       gabapentin 300 MG capsule    NEURONTIN     Take 300 mg by mouth every evening        * HYDROcodone-acetaminophen 5-325 MG per tablet    NORCO     Take 0.5 tablets by mouth 2 times daily        * HYDROcodone-acetaminophen 5-325 MG per tablet    NORCO    30 tablet    Take 1-2 tablets by mouth every 4 hours as needed for other (Moderate to Severe Pain)    Ventral hernia without obstruction or gangrene       hyoscyamine 0.125 MG tablet    ANASPAZ/LEVSIN    30 tablet    Take 1 tablet (125 mcg) by mouth 4 times daily (before meals and nightly)    Irritable bowel syndrome, unspecified type       levothyroxine 125 MCG tablet    SYNTHROID/LEVOTHROID    90 tablet    Take 1 tablet (125 mcg) by mouth daily    Hypothyroidism, unspecified type       lisinopril 20 MG tablet    PRINIVIL/ZESTRIL    90 tablet    Take 1 tablet (20 mg) by mouth daily    Essential hypertension, benign, Type 2 diabetes mellitus with diabetic neuropathy, without long-term current use of insulin (H)       LYRICA 150 MG capsule   Generic drug:  pregabalin      Take 150 mg by mouth 3 times daily.        meclizine 25 MG tablet    ANTIVERT    30 tablet    TAKE ONE TABLET BY MOUTH EVERY SIX HOURS AS NEEDED FOR DIZZINESS    Vertigo, benign positional, unspecified laterality       metFORMIN 1000 MG tablet    GLUCOPHAGE    180 tablet    TAKE ONE TABLET BY MOUTH TWICE DAILY WITH MEALS    Type 2 diabetes mellitus without complication, without long-term current use of insulin (H)       OXYCONTIN 10 MG 12 hr tablet   Generic drug:  oxyCODONE      Take 10 mg by mouth At Bedtime        SENIOR MULTIVITAMIN PLUS Tabs      Take 1 tablet by mouth daily        simvastatin 20 MG tablet    ZOCOR    90 tablet    TAKE 1 TABLET (20 MG) BY MOUTH AT BEDTIME    Mixed hyperlipidemia, Type 2 diabetes mellitus with diabetic neuropathy, without long-term current use of insulin (H)       VITAMIN B  12 PO      Take 50 mcg by mouth daily        VITAMIN D (CHOLECALCIFEROL) PO      Take 1,000 Units by mouth daily        * Notice:  This list has 2 medication(s) that are the same as other medications prescribed for you. Read the directions carefully, and ask your doctor or other care provider to review them with you.

## 2018-01-01 NOTE — PROGRESS NOTES
Hospitalist Problem: Mobility Impaired (Adult and Pediatric)  Goal: *Acute Goals and Plan of Care (Insert Text)  Physical Therapy Goals  Initiated 1/5/2017 to be met within 2 days  STGs:  1. Rolling toward right with min/mod assist; Supine to/from supine with min assist in prep for ADL activity. 2. Tolerated sitting EOB 10+min for ADL activity. Outcome: Progressing Towards Goal  PHYSICAL THERAPY TREATMENT     Patient: Kash Jcaome (42 y.o. female)  Date: 1/6/2017  Diagnosis: fall  SHOULDER FRACTURE  Frequent falls <principal problem not specified>  Procedure(s) (LRB):  REVERSE LEFT TOTAL SHOULDER ARTHROPLASTY, PATIENT IS IN ROOM 327 (Left)    Precautions: Fall   Chart, physical therapy assessment, plan of care and goals were reviewed. ASSESSMENT:  Pt seen with OT this date. Pt progressing with increased independence in supine to sit transfer. Primarily needs min assist to complete movement of LE's off bed. Pt able to sit >15 min EOB while completing hygiene care/ADL's with good dynamic balance noted. Left UE remains in sling. Gown changed. Noted wound lateral superior right hip and right breast. Nurse Ramirez Pain notified and in to assess/dress wound. Pt also received pain med from nurse Ramirez Pain during session for pain 9/10. Pt returned to long sit; able to scoot small distance toward HOB 2x while in long sit position. Required total assist of 2 with pt supine to complete positioning at top of bed. Pt left with all needs in reach. Note, surgery postponed today and pt on schedule for Monday per chart. Standing not attempted thus far due to results of x-ray right proximal phalanx 5th toe. Progression toward goals:  [ ]      Improving appropriately and progressing toward goals  [X]      Improving slowly and progressing toward goals  [ ]      Not making progress toward goals and plan of care will be adjusted       PLAN:  Patient continues to benefit from skilled intervention to address the above impairments. Continue treatment per established plan of care. Discharge Recommendations:  Bryan Marmolejo  Further Equipment Recommendations for Discharge: To be determined       SUBJECTIVE:   Patient stated Can we not do this today.       OBJECTIVE DATA SUMMARY:   Critical Behavior:  Neurologic State: Alert  Orientation Level: Oriented X4  Cognition: Appropriate decision making, Appropriate for age attention/concentration, Appropriate safety awareness, Follows commands  Safety/Judgement: Awareness of environment, Fall prevention  Functional Mobility Training:  Bed Mobility:  Rolling: Moderate assistance;Assist x2; Additional time  Supine to Sit: Minimum assistance;Stand-by asssistance; Additional time (with HOB elevated)  Sit to Supine: Moderate assistance; Additional time  Scooting: Total assistance;Assist x2  Balance:  Sitting: Intact  Pain:  Pain Scale 1: Numeric (0 - 10)  Pain Intensity 1: 10  Pain Location 1: Shoulder;Arm  Pain Orientation 1: Lower  Pain Description 1: Aching; Shooting  Pain Intervention(s) 1: Medication (see MAR)  Activity Tolerance:   Good   Please refer to the flowsheet for vital signs taken during this treatment.   After treatment:   [ ] Patient left in no apparent distress sitting up in chair  [X] Patient left in no apparent distress in bed  [X] Call bell left within reach  [X] Nursing notified  [ ] Caregiver present  [ ] Bed alarm activated      Jayne Green, PT   Time Calculation: 38 mins

## 2019-09-17 ENCOUNTER — APPOINTMENT (OUTPATIENT)
Dept: GENERAL RADIOLOGY | Age: 69
End: 2019-09-17
Attending: PHYSICIAN ASSISTANT
Payer: MEDICARE

## 2019-09-17 ENCOUNTER — HOSPITAL ENCOUNTER (EMERGENCY)
Age: 69
Discharge: HOME | End: 2019-09-18
Attending: EMERGENCY MEDICINE
Payer: MEDICARE

## 2019-09-17 VITALS
RESPIRATION RATE: 18 BRPM | WEIGHT: 293 LBS | BODY MASS INDEX: 51.91 KG/M2 | SYSTOLIC BLOOD PRESSURE: 122 MMHG | OXYGEN SATURATION: 100 % | TEMPERATURE: 98.1 F | HEART RATE: 72 BPM | DIASTOLIC BLOOD PRESSURE: 70 MMHG | HEIGHT: 63 IN

## 2019-09-17 DIAGNOSIS — S92.315A CLOSED NONDISPLACED FRACTURE OF FIRST METATARSAL BONE OF LEFT FOOT, INITIAL ENCOUNTER: ICD-10-CM

## 2019-09-17 DIAGNOSIS — Z23 NEED FOR TETANUS BOOSTER: ICD-10-CM

## 2019-09-17 DIAGNOSIS — S91.312A LACERATION OF LEFT FOOT, INITIAL ENCOUNTER: ICD-10-CM

## 2019-09-17 DIAGNOSIS — S93.602A SPRAIN OF LEFT FOOT, INITIAL ENCOUNTER: Primary | ICD-10-CM

## 2019-09-17 PROCEDURE — 77030036687 HC SHOE PSTOP S2SG -A

## 2019-09-17 PROCEDURE — 75810000293 HC SIMP/SUPERF WND  RPR

## 2019-09-17 PROCEDURE — 74011250636 HC RX REV CODE- 250/636: Performed by: PHYSICIAN ASSISTANT

## 2019-09-17 PROCEDURE — 90471 IMMUNIZATION ADMIN: CPT

## 2019-09-17 PROCEDURE — 99284 EMERGENCY DEPT VISIT MOD MDM: CPT

## 2019-09-17 PROCEDURE — 74011000250 HC RX REV CODE- 250: Performed by: PHYSICIAN ASSISTANT

## 2019-09-17 PROCEDURE — 73620 X-RAY EXAM OF FOOT: CPT

## 2019-09-17 PROCEDURE — 77030018836 HC SOL IRR NACL ICUM -A

## 2019-09-17 PROCEDURE — 77030002986 HC SUT PROL J&J -A

## 2019-09-17 PROCEDURE — 90715 TDAP VACCINE 7 YRS/> IM: CPT | Performed by: PHYSICIAN ASSISTANT

## 2019-09-17 RX ORDER — LIDOCAINE HYDROCHLORIDE 10 MG/ML
15 INJECTION INFILTRATION; PERINEURAL
Status: COMPLETED | OUTPATIENT
Start: 2019-09-17 | End: 2019-09-17

## 2019-09-17 RX ORDER — LIDOCAINE HYDROCHLORIDE 10 MG/ML
15 INJECTION INFILTRATION; PERINEURAL
Status: DISCONTINUED | OUTPATIENT
Start: 2019-09-17 | End: 2019-09-17

## 2019-09-17 RX ADMIN — LIDOCAINE HYDROCHLORIDE 15 ML: 10 INJECTION, SOLUTION INFILTRATION; PERINEURAL at 14:28

## 2019-09-17 RX ADMIN — TETANUS TOXOID, REDUCED DIPHTHERIA TOXOID AND ACELLULAR PERTUSSIS VACCINE, ADSORBED 0.5 ML: 5; 2.5; 8; 8; 2.5 SUSPENSION INTRAMUSCULAR at 14:27

## 2019-09-17 NOTE — ED PROVIDER NOTES
EMERGENCY DEPARTMENT HISTORY AND PHYSICAL EXAM    Date: 9/17/2019  Patient Name: Jacob Gutierrez    History of Presenting Illness     Chief Complaint   Patient presents with    Laceration    Foot Pain         History Provided By: Patient    Chief Complaint: laceration       Additional History (Context):   2:06 PM  Jacob Gutierrez is a 79 y.o. female with PMHX factor V [place], diabetes, endometrial cancer, high cholesterol presents to the emergency department C/O laceration to the left ankle and foot pain. Patient states she was in her wheelchair when her foot got caught on a piece of the chair. She twisted her left foot and has a laceration from a piece of metal on the chair. Unsure of tetanus status. She denies any numbness or tingling. She does have a history of diabetes that is controlled without medication. PCP: Karl Cedeno MD    Current Outpatient Medications   Medication Sig Dispense Refill    traMADol (ULTRAM) 50 mg tablet Take 1 Tab by mouth every six (6) hours as needed. Max Daily Amount: 200 mg. 20 Tab 0    polyethylene glycol (MIRALAX) 17 gram packet Take 1 Packet by mouth daily. 1 Packet 0    multivitamin (ONE A DAY) tablet Take 1 Tab by mouth daily.  calcium-vitamin D (OYSTER SHELL) 500 mg(1,250mg) -200 unit per tablet Take 1 Tab by mouth two (2) times daily (with meals).  omega-3 fatty acids-vitamin e (FISH OIL) 1,000 mg cap Take 1 Cap by mouth.  flaxseed oil 1,000 mg cap Take  by mouth.  glucosamine-chondroitin (ARTHX) 500-400 mg cap Take 1 Cap by mouth daily.  acetaminophen (TYLENOL ARTHRITIS PAIN) 650 mg CR tablet Take 650 mg by mouth every six (6) hours as needed for Pain.  budesonide-formoterol (SYMBICORT) 160-4.5 mcg/actuation HFA inhaler Take 2 Puffs by inhalation two (2) times a day.  gabapentin (NEURONTIN) 100 mg capsule Take 300 mg by mouth two (2) times a day.  300 mg in evening, then 600 mg at bedtime  Indications: NEUROPATHIC PAIN  albuterol (PROVENTIL HFA, VENTOLIN HFA, PROAIR HFA) 90 mcg/actuation inhaler Take  by inhalation.  fluticasone (FLONASE) 50 mcg/actuation nasal spray 2 Sprays by Both Nostrils route daily.  risedronate (ACTONEL) 150 mg tablet Take 150 mg by mouth every thirty (30) days.  rOPINIRole (REQUIP) 1 mg tablet Take 2 mg by mouth three (3) times daily. 1 mg in evening (1900), then 2 mg at bedtime (0100)  Indications: Restless Legs Syndrome      pravastatin (PRAVACHOL) 40 mg tablet Take 40 mg by mouth nightly.  enoxaparin (LOVENOX) 60 mg/0.6 mL injection 60 mg by SubCUTAneous route daily.  loratadine (CLARITIN) 10 mg tablet Take 10 mg by mouth daily.  BUSPIRONE HCL (BUSPAR PO) Take 10 mg by mouth two (2) times a day.  FUROSEMIDE (LASIX PO) Take 20 mg by mouth daily.  MONTELUKAST SODIUM (SINGULAIR PO) Take  by mouth.  TRAZODONE HCL (TRAZODONE PO) Take 200 mg by mouth nightly as needed for Other (sleep).  SERTRALINE HCL (ZOLOFT PO) Take 100 mg by mouth daily.  HYDROcodone-acetaminophen (NORCO) 7.5-325 mg per tablet Take 1 Tab by mouth every six (6) hours as needed for Pain. Max Daily Amount: 4 Tabs. 20 Tab 0    tolterodine (DETROL) 2 mg tablet Take 1 Tab by mouth two (2) times a day.  Indications: BLADDER HYPERACTIVITY 61 Tab 11       Past History     Past Medical History:  Past Medical History:   Diagnosis Date    Active rheumatic fever with cardiac involvement     Anemia     Asthma     Chronic back pain     DDD (degenerative disc disease), cervical     Depression     Diabetes (HCC)     [facility]     Edema     Factor V Leiden mutation ([facility]     Fatty liver disease, non-alcoholic 7/89/2545    GERD (gastroesophageal reflux disease)     Heart abnormality     HNP (herniated nucleus pulposus)     Hypercholesterolemia     Knee pain     Lymphedema     Osteopenia     RLS (restless legs syndrome)     Spinal stenosis  Uterine endometrial cancer, sarcoma ([facility]        Past Surgical History:  Past Surgical History:   Procedure Laterality Date    HX CARPAL TUNNEL RELEASE      HX [facility]IC BYPASS      In [facility] years ago.  HX GASTRIC BYPASS      HX ORAL AND BSO      LAP,CHOLECYSTECTOMY         Family History:  History reviewed. No pertinent family history. Social History:  Social History     Tobacco Use    Smoking status: Never Smoker    Smokeless tobacco: Never Used   Substance Use Topics    Alcohol use: No    Drug use: No       Allergies: Allergies   Allergen Reactions    Augmentin [Amoxicillin-Pot Clavulanate] Other (comments)     Sever diarrhea    Codeine Unknown (comments)    Lodine [Etodolac] Unknown (comments)       Review of Systems   Review of Systems   Constitutional: Negative for chills and fever. Musculoskeletal: Positive for arthralgias. Left foot     Skin: Positive for wound. Laceration left ankle     Neurological: Negative for weakness and numbness. All other systems reviewed and are negative. Physical Exam     Vitals:    09/17/19 1412   BP: 117/63   Pulse: 73   Resp: 17   Temp: 98.2 °F (36.8 °C)   SpO2: 100%   Weight: 136.1 kg (300 lb)   Height: 5' 3\" (1.6 m)     Physical Exam   Constitutional: She is oriented to person, place, and time. She appears well-developed and well-nourished. Alert, well-appearing, obese female, lying on stretcher   HENT:   Head: Normocephalic and atraumatic. Neck: Normal range of motion. Neck supple. Cardiovascular: Normal rate, regular rhythm, normal heart sounds and intact distal pulses. No murmur heard. Pulmonary/Chest: Effort normal and breath sounds normal. No respiratory distress. She has no wheezes. She has no rales. Musculoskeletal:        Left foot: There is tenderness. There is normal range of motion, no swelling, no crepitus and no deformity. Feet:    Neurological: She is alert and oriented to person, place, and time. Psychiatric: She has a normal mood and affect. Judgment normal.   Nursing note and vitals reviewed. Diagnostic Study Results     Labs:   No results found for this or any previous visit (from the past 12 hour(s)). Radiologic Studies:   XR FOOT LT AP/LAT   Final Result   Impression:      1. Decreased mineralization and questionable small fracture fragment at the   medial base of the first metatarsal. Cannot exclude transverse fracture at the   base of the fourth proximal phalanx and neck of the fifth proximal phalanx. Correlate with region of symptom and consider further evaluation as indicated. 2. Plantar aponeurosis ossification likely chronic fasciitis. Calcaneal   enthesophyte at the noted. 3. Atherosclerosis. Radiographic decreased mineralization. CT Results  (Last 48 hours)    None        CXR Results  (Last 48 hours)    None            Medical Decision Making   I am the first provider for this patient. I reviewed the vital signs, available nursing notes, past medical history, past surgical history, family history and social history. Vital Signs: Reviewed the patient's vital signs. Pulse Oximetry Analysis: 100% on RA     Records Reviewed: Nursing Notes and Old Medical Records    Procedures:  Wound Repair  Date/Time: 9/17/2019 2:22 PM  Performed by: PAPreparation: skin prepped with Shur-Clens  Pre-procedure re-eval: Immediately prior to the procedure, the patient was reevaluated and found suitable for the planned procedure and any planned medications. Time out: Immediately prior to the procedure a time out was called to verify the correct patient, procedure, equipment, staff and marking as appropriate. .  Location details: left ankle  Wound length:2.6 - 7.5 cm  Anesthesia: local infiltration    Anesthesia:  Local Anesthetic: lidocaine 1% without epinephrine  Anesthetic total: 7 mL  Foreign bodies: no foreign bodies  Irrigation solution: saline  Irrigation method: syringe  Debridement: none  Skin closure: Prolene (6-0)  Number of sutures: 8  Technique: simple and interrupted  Approximation: close  Dressing: 4x4  Patient tolerance: Patient tolerated the procedure well with no immediate complications  My total time at bedside, performing this procedure was 1-15 minutes. ED Course:   2:06 PM Initial assessment performed. The patients presenting problems have been discussed, and they are in agreement with the care plan formulated and outlined with them. I have encouraged them to ask questions as they arise throughout their visit. Discussion:  Pt presents with her foot laceration and pain. X-ray reasonable pregnant fracture at the base of the first metatarsal, patient is tender over the site. Also questionable fractures at the fourth and fifth phalanx however patient has no tenderness over these toes. Will place in postop shoe. Patient uses wheelchair to get around therefore no crutches ordered. Will have patient follow-up with Ortho. Wound was repaired. Tetanus updated. Patient does have a history of diabetes but is relatively well controlled with diet. Will have patient watch for signs of infection. Strict return precautions given, pt offering no questions or complaints. Diagnosis and Disposition     DISCHARGE NOTE:  Lizzie Rodriguez  results have been reviewed with her. She has been counseled regarding her diagnosis, treatment, and plan. She verbally conveys understanding and agreement of the signs, symptoms, diagnosis, treatment and prognosis and additionally agrees to follow up as discussed. She also agrees with the care-plan and conveys that all of her questions have been answered. I have also provided discharge instructions for her that include: educational information regarding their diagnosis and treatment, and list of reasons why they would want to return to the ED prior to their follow-up appointment, should her condition change.  She has been provided with education for proper emergency department utilization. CLINICAL IMPRESSION:    1. Sprain of left foot, initial encounter    2. Laceration of left foot, initial encounter    3. Need for tetanus booster    4. Closed nondisplaced fracture of first metatarsal bone of left foot, initial encounter        PLAN:  1. D/C Home  2. Current Discharge Medication List        3. Follow-up Information     Follow up With Specialties Details Why Contact Info    Sanford Flores MD Family Practice  Follow up in 10 days for suture removal [address] EMERGENCY DEPT Emergency Medicine  If symptoms worsen [address] [place] [address]  [phone] [id number], [address], DO Orthopedic Surgery  call for follow up  [address] [facility] and [address] [place] [address]  [phone]                   Please note that this dictation was completed with Docurated, the computer voice recognition software. Quite often unanticipated grammatical, syntax, homophones, and other interpretive errors are inadvertently transcribed by the computer software. Please disregard these errors. Please excuse any errors that have escaped final proofreading.

## 2019-09-17 NOTE — ED TRIAGE NOTES
Pt arrives alert and oriented c/o L foot pain. She was on her motorized wheelchair and cut her foot on a medal piece on her chair and twisted her foot. Pt has + pulses and sensation. Pt reports she takes blood thinners, bleeding controlled on arrival. Last tetanus unknown.

## 2019-09-18 NOTE — ED NOTES
Patient resting comfortably on stretcher, no distress noted. Will continue to monitor. Waiting on [facility] transport to take home.

## 2019-09-18 NOTE — ED NOTES
I have reviewed discharge instructions with the patient. The patient verbalized understanding. Patient armband removed and shredded  Patient d/c home in stable condition [facility]. No distress noted.

## 2019-09-18 NOTE — PROGRESS NOTES
Page received from [address], informed cm that pt was discharged transferred back home by medical transport pt at that time realized key was left inside home,ems returned pt back to [facility] ED, cm suggest that staff find out if pt has family or friends with spear key,or contact landlord or apartment emergency after hours contact, if all fails call police dept which pt can give permission to inter apartment or locate ,

## 2021-08-17 ENCOUNTER — ANESTHESIA EVENT (OUTPATIENT)
Dept: SURGERY | Age: 71
End: 2021-08-17
Payer: MEDICARE

## 2021-08-17 RX ORDER — INSULIN LISPRO 100 [IU]/ML
INJECTION, SOLUTION INTRAVENOUS; SUBCUTANEOUS ONCE
Status: CANCELLED | OUTPATIENT
Start: 2021-08-17 | End: 2021-08-18

## 2021-08-17 RX ORDER — SODIUM CHLORIDE 0.9 % (FLUSH) 0.9 %
5-40 SYRINGE (ML) INJECTION AS NEEDED
Status: CANCELLED | OUTPATIENT
Start: 2021-08-17

## 2021-08-17 RX ORDER — NALOXONE HYDROCHLORIDE 0.4 MG/ML
0.1 INJECTION, SOLUTION INTRAMUSCULAR; INTRAVENOUS; SUBCUTANEOUS AS NEEDED
Status: CANCELLED | OUTPATIENT
Start: 2021-08-17

## 2021-08-17 RX ORDER — FLUMAZENIL 0.1 MG/ML
0.2 INJECTION INTRAVENOUS
Status: CANCELLED | OUTPATIENT
Start: 2021-08-17

## 2021-08-17 RX ORDER — MAGNESIUM SULFATE 100 %
4 CRYSTALS MISCELLANEOUS AS NEEDED
Status: CANCELLED | OUTPATIENT
Start: 2021-08-17

## 2021-08-17 RX ORDER — SODIUM CHLORIDE 0.9 % (FLUSH) 0.9 %
5-40 SYRINGE (ML) INJECTION EVERY 8 HOURS
Status: CANCELLED | OUTPATIENT
Start: 2021-08-17

## 2021-08-17 RX ORDER — DEXTROSE 50 % IN WATER (D50W) INTRAVENOUS SYRINGE
25-50 AS NEEDED
Status: CANCELLED | OUTPATIENT
Start: 2021-08-17

## 2021-08-17 RX ORDER — SODIUM CHLORIDE, SODIUM LACTATE, POTASSIUM CHLORIDE, CALCIUM CHLORIDE 600; 310; 30; 20 MG/100ML; MG/100ML; MG/100ML; MG/100ML
1000 INJECTION, SOLUTION INTRAVENOUS CONTINUOUS
Status: CANCELLED | OUTPATIENT
Start: 2021-08-17

## 2021-08-18 ENCOUNTER — HOSPITAL ENCOUNTER (OUTPATIENT)
Age: 71
Setting detail: OUTPATIENT SURGERY
Discharge: HOME | End: 2021-08-18
Attending: OPHTHALMOLOGY | Admitting: OPHTHALMOLOGY
Payer: MEDICARE

## 2021-08-18 ENCOUNTER — ANESTHESIA (OUTPATIENT)
Dept: SURGERY | Age: 71
End: 2021-08-18
Payer: MEDICARE

## 2021-08-18 VITALS
HEIGHT: 63 IN | WEIGHT: 293 LBS | TEMPERATURE: 97.4 F | DIASTOLIC BLOOD PRESSURE: 78 MMHG | HEART RATE: 63 BPM | RESPIRATION RATE: 18 BRPM | SYSTOLIC BLOOD PRESSURE: 146 MMHG | BODY MASS INDEX: 51.91 KG/M2 | OXYGEN SATURATION: 99 %

## 2021-08-18 PROBLEM — H26.9 CATARACT: Status: ACTIVE | Noted: 2021-08-18

## 2021-08-18 PROCEDURE — 76010000154 HC OR TIME FIRST 0.5 HR: Performed by: OPHTHALMOLOGY

## 2021-08-18 PROCEDURE — 77030020782 HC GWN BAIR PAWS FLX 3M -B: Performed by: OPHTHALMOLOGY

## 2021-08-18 PROCEDURE — V2632 POST CHMBR INTRAOCULAR LENS: HCPCS | Performed by: OPHTHALMOLOGY

## 2021-08-18 PROCEDURE — 76210000021 HC REC RM PH II 0.5 TO 1 HR: Performed by: OPHTHALMOLOGY

## 2021-08-18 PROCEDURE — 77030031761 HC CYSTOTOM OPHTH IRR SYS BVTC -A: Performed by: OPHTHALMOLOGY

## 2021-08-18 PROCEDURE — 74011000250 HC RX REV CODE- 250: Performed by: OPHTHALMOLOGY

## 2021-08-18 PROCEDURE — 77030018837 HC SOL IRR OPTH ALCN -A: Performed by: OPHTHALMOLOGY

## 2021-08-18 PROCEDURE — 77030006685 HC BLD OPHTH ALCN -B: Performed by: OPHTHALMOLOGY

## 2021-08-18 PROCEDURE — 76060000031 HC ANESTHESIA FIRST 0.5 HR: Performed by: OPHTHALMOLOGY

## 2021-08-18 PROCEDURE — 77030040361 HC SLV COMPR DVT MDII -B: Performed by: OPHTHALMOLOGY

## 2021-08-18 PROCEDURE — 77030013851 HC PK PHACO KT ALCN -B: Performed by: OPHTHALMOLOGY

## 2021-08-18 PROCEDURE — 74011250636 HC RX REV CODE- 250/636: Performed by: OPHTHALMOLOGY

## 2021-08-18 PROCEDURE — 77030013389: Performed by: OPHTHALMOLOGY

## 2021-08-18 PROCEDURE — 2709999900 HC NON-CHARGEABLE SUPPLY: Performed by: OPHTHALMOLOGY

## 2021-08-18 PROCEDURE — 74011250636 HC RX REV CODE- 250/636: Performed by: ANESTHESIOLOGY

## 2021-08-18 DEVICE — ACRYSOF(R) SINGLE-PIECE ACRYLIC FOLDABLE IOL,UV-ABSORBING POSTERIOR CHAMBER LENS (IOL/PC),13.0MM LENGTH, 6.0MM BICONVEX OPTIC, PLANARHAPTICS.
Type: IMPLANTABLE DEVICE | Site: EYE | Status: FUNCTIONAL
Brand: ACRYSOF®

## 2021-08-18 RX ORDER — TROPICAMIDE 10 MG/ML
1 SOLUTION/ DROPS OPHTHALMIC
Status: COMPLETED | OUTPATIENT
Start: 2021-08-18 | End: 2021-08-18

## 2021-08-18 RX ORDER — EPINEPHRINE 1 MG/ML
INJECTION, SOLUTION, CONCENTRATE INTRAVENOUS AS NEEDED
Status: DISCONTINUED | OUTPATIENT
Start: 2021-08-18 | End: 2021-08-18 | Stop reason: HOSPADM

## 2021-08-18 RX ORDER — SODIUM CHLORIDE, SODIUM LACTATE, POTASSIUM CHLORIDE, CALCIUM CHLORIDE 600; 310; 30; 20 MG/100ML; MG/100ML; MG/100ML; MG/100ML
125 INJECTION, SOLUTION INTRAVENOUS CONTINUOUS
Status: DISCONTINUED | OUTPATIENT
Start: 2021-08-18 | End: 2021-08-18 | Stop reason: HOSPADM

## 2021-08-18 RX ORDER — MIDAZOLAM HYDROCHLORIDE 1 MG/ML
INJECTION, SOLUTION INTRAMUSCULAR; INTRAVENOUS AS NEEDED
Status: DISCONTINUED | OUTPATIENT
Start: 2021-08-18 | End: 2021-08-18 | Stop reason: HOSPADM

## 2021-08-18 RX ORDER — PHENYLEPHRINE HYDROCHLORIDE 25 MG/ML
1 SOLUTION/ DROPS OPHTHALMIC
Status: COMPLETED | OUTPATIENT
Start: 2021-08-18 | End: 2021-08-18

## 2021-08-18 RX ORDER — FENTANYL CITRATE 50 UG/ML
INJECTION, SOLUTION INTRAMUSCULAR; INTRAVENOUS AS NEEDED
Status: DISCONTINUED | OUTPATIENT
Start: 2021-08-18 | End: 2021-08-18 | Stop reason: HOSPADM

## 2021-08-18 RX ORDER — OFLOXACIN 3 MG/ML
1 SOLUTION/ DROPS OPHTHALMIC
Status: DISCONTINUED | OUTPATIENT
Start: 2021-08-18 | End: 2021-08-18 | Stop reason: HOSPADM

## 2021-08-18 RX ADMIN — MIDAZOLAM 2 MG: 1 INJECTION INTRAMUSCULAR; INTRAVENOUS at 09:42

## 2021-08-18 RX ADMIN — PHENYLEPHRINE HYDROCHLORIDE 1 DROP: 2.5 SOLUTION/ DROPS OPHTHALMIC at 08:16

## 2021-08-18 RX ADMIN — TROPICAMIDE 1 DROP: 10 SOLUTION/ DROPS OPHTHALMIC at 08:08

## 2021-08-18 RX ADMIN — OFLOXACIN 1 DROP: 3 SOLUTION OPHTHALMIC at 08:01

## 2021-08-18 RX ADMIN — PHENYLEPHRINE HYDROCHLORIDE 1 DROP: 2.5 SOLUTION/ DROPS OPHTHALMIC at 08:21

## 2021-08-18 RX ADMIN — TROPICAMIDE 1 DROP: 10 SOLUTION/ DROPS OPHTHALMIC at 08:21

## 2021-08-18 RX ADMIN — FENTANYL CITRATE 50 MCG: 50 INJECTION, SOLUTION INTRAMUSCULAR; INTRAVENOUS at 09:44

## 2021-08-18 RX ADMIN — TROPICAMIDE 1 DROP: 10 SOLUTION/ DROPS OPHTHALMIC at 08:16

## 2021-08-18 RX ADMIN — LIDOCAINE HYDROCHLORIDE 2 DROP: 35 GEL OPHTHALMIC at 08:22

## 2021-08-18 RX ADMIN — PHENYLEPHRINE HYDROCHLORIDE 1 DROP: 2.5 SOLUTION/ DROPS OPHTHALMIC at 08:08

## 2021-08-18 RX ADMIN — SODIUM CHLORIDE, SODIUM LACTATE, POTASSIUM CHLORIDE, AND CALCIUM CHLORIDE 125 ML/HR: 600; 310; 30; 20 INJECTION, SOLUTION INTRAVENOUS at 08:25

## 2021-08-18 RX ADMIN — PHENYLEPHRINE HYDROCHLORIDE 1 DROP: 2.5 SOLUTION/ DROPS OPHTHALMIC at 08:02

## 2021-08-18 RX ADMIN — FENTANYL CITRATE 50 MCG: 50 INJECTION, SOLUTION INTRAMUSCULAR; INTRAVENOUS at 09:47

## 2021-08-18 RX ADMIN — TROPICAMIDE 1 DROP: 10 SOLUTION/ DROPS OPHTHALMIC at 08:02

## 2021-08-18 NOTE — ANESTHESIA PREPROCEDURE EVALUATION
Relevant Problems   GASTROINTESTINAL   (+) NAFLD (nonalcoholic fatty liver disease)       Anesthetic History   No history of anesthetic complications            Review of Systems / Medical History  Patient summary reviewed, nursing notes reviewed and pertinent labs reviewed    Pulmonary                   Neuro/Psych         Psychiatric history     Cardiovascular              Hyperlipidemia    Exercise tolerance: <4 METS     GI/Hepatic/Renal  Within defined limits   GERD      Liver disease     Endo/Other        Morbid obesity and arthritis  Pertinent negatives: No diabetes   Other Findings   Comments: Wheelchair bound           Physical Exam    Airway  Mallampati: II  TM Distance: 4 - 6 cm  Neck ROM: normal range of motion, short neck        Cardiovascular               Dental         Pulmonary                 Abdominal  GI exam deferred       Other Findings            Anesthetic Plan    ASA: 3  Anesthesia type: MAC          Induction: Intravenous  Anesthetic plan and risks discussed with: Patient

## 2021-08-18 NOTE — PERIOP NOTES
Discharge instructions reviewed with patient and family. Opportunity for questions and answers given. No further questions at this time.    Tolerating po fluids - no c/o offered - states \" I'm ready to go \" - request sign out from anesthesia

## 2021-08-18 NOTE — INTERVAL H&P NOTE
Update History & Physical    The Patient's History and Physical of August 18, 2021 was reviewed with the patient and I examined the patient. There was no change. The surgical site was confirmed by the patient and me. Plan:  The risk, benefits, expected outcome, and alternative to the recommended procedure have been discussed with the patient. Patient understands and wants to proceed with the procedure.     Electronically signed by Rahul Lopez MD on 8/18/2021 at 8:59 AM

## 2021-08-18 NOTE — OP NOTES
Cataract Operative Note      Patient: Adithya Cristina               Sex: female          DOA: 8/18/2021         YOB: 1950      Age:  78 y.o.        LOS:  LOS: 0 days     Preoperative Diagnosis: Cataract right eye    Postoperative Diagnosis:  Cataract  right eye  Surgeon: Rahul Lopez MD, M.D. Anesthesia:  Topical anesthesia  Procedure:  Phacoemulsification of posterior chamber for intraocular lens implantation right    Fluids:  0    Procedure in Detail: The operative eye was prepped and draped in the usual fashion. A lid speculum was placed in the operative eye. A clear cornea approach was utilized. A paracentesis incision(s) was constructed with a 1 mm slit knife. One percent preservative-free lidocaine followed by viscoelastic was instilled into the anterior chamber. A clear corneal incision was made with a slit knife. A continuous curvilinear capsulorrhexis was constructed followed by hydrodissection. A phacoemulsification tip was placed into the eye, and the lens nucleus was emulsified. The irrigation/aspiration device was then used to remove any remaining cortical material.  Polishing of the capsule was performed as needed. The intraocular lens was then placed into the capsular bag after it was re-inflated with viscoelastic. The remaining viscoelastic was then removed using the irrigation/aspiration device. BSS on a cannula was then used to hydrate the wound edges. At the end of the procedure the wound was found to be watertight, the anterior chamber was deep and the pupil round. No blood loss during surgery. An antibiotic and anti-inflammatroy was placed into the operative eye. The lid speculum was removed. Protective sunglasses were then placed onto the patient. The patient was taken to the [address] [place] [address] Unit (PACU) in good condition having tolerated the procedure well. Estimated Blood Loss: 0                 Implants:   Implant Name Type Inv.  Item Serial No.  Lot No. LRB No. Used Action   LENS INTOCU 6.0 TO 30.0 DIOPT 118.4 A CONSTANT 0DEG ANG - [id number]  LENS INTOCU 6.0 TO 30.0 DIOPT 118.4 A CONSTANT 0DEG ANG [id number] [employer]  Right 1 Implanted       Specimens: * No specimens in log *            Complications:  None           Libertad Abraham MD , MLOKESH.  [unfilled]  10:08 AM

## 2021-08-18 NOTE — PERIOP NOTES
Reviewed PTA medication list with patient/caregiver and patient/caregiver denies any additional medications. Patient admits to having a responsible adult care for them at home for at least 24 hours after surgery. Patient encouraged to use gown warming system and informed that using said warming gown to regulate body temperature prior to a procedure has been shown to help reduce the risks of blood clots and infection. Patient's pharmacy of choice verified and documented in PTA medication section. Dual skin assessment & fall risk band verification completed with SOHAN Leon.

## 2021-08-18 NOTE — PERIOP NOTES
In accordance with the surgeon notification escalation protocol Luh Mcguire RN was contacted and informed that pt last took Lovenox 60 mg 8/16/21. Amna Henson states that she will inform Dr. Amira Sanchez and call back with any questions or concerns.

## 2021-08-18 NOTE — ANESTHESIA POSTPROCEDURE EVALUATION
Procedure(s):  CATARACT EXTRACTION WITH INTRA OCULAR LENS IMPLANT- RIGHT EYE . MAC    Anesthesia Post Evaluation        Comments: Post-Anesthesia Evaluation and Assessment    Cardiovascular Function/Vital Signs  BP (!) 136/90   Pulse 71   Temp 36.3 °C (97.4 °F)   Resp 16   Ht 5' 3\" (1.6 m)   Wt 154.3 kg (340 lb 3.2 oz)   SpO2 97%   BMI 60.26 kg/m²     Patient is status post Procedure(s):  CATARACT EXTRACTION WITH INTRA OCULAR LENS IMPLANT- RIGHT EYE . Nausea/Vomiting: Controlled. Postoperative hydration reviewed and adequate. Pain:  Pain Scale 1: Numeric (0 - 10) (08/18/21 1030)  Pain Intensity 1: 0 (08/18/21 1030)   Managed. Neurological Status:   Neuro (WDL): Exceptions to WDL (08/18/21 1012)   At baseline. Mental Status and Level of Consciousness: Arousable. Pulmonary Status:   O2 Device: None (Room air) (08/18/21 1030)   Adequate oxygenation and airway patent. Complications related to anesthesia: None    Post-anesthesia assessment completed. No concerns. Patient has met all discharge requirements.     Signed By: Akhil Nair MD    August 18, 2021                   INITIAL Post-op Vital signs:   Vitals Value Taken Time   /90 08/18/21 1030   Temp 36.3 °C (97.4 °F) 08/18/21 1012   Pulse 71 08/18/21 1035   Resp 16 08/18/21 1030   SpO2 97 % 08/18/21 1035

## 2021-11-09 ENCOUNTER — HOSPITAL ENCOUNTER (OUTPATIENT)
Dept: PREADMISSION TESTING | Age: 71
Discharge: HOME | End: 2021-11-09

## 2021-11-09 VITALS — HEIGHT: 63 IN | BODY MASS INDEX: 51.91 KG/M2 | WEIGHT: 293 LBS

## 2021-11-09 RX ORDER — SODIUM CHLORIDE, SODIUM LACTATE, POTASSIUM CHLORIDE, CALCIUM CHLORIDE 600; 310; 30; 20 MG/100ML; MG/100ML; MG/100ML; MG/100ML
75 INJECTION, SOLUTION INTRAVENOUS CONTINUOUS
Status: CANCELLED | OUTPATIENT
Start: 2021-11-09

## 2021-11-09 RX ORDER — ENOXAPARIN SODIUM 100 MG/ML
60 INJECTION SUBCUTANEOUS DAILY
COMMUNITY
End: 2021-11-09

## 2021-11-09 RX ORDER — PHENYLEPHRINE HYDROCHLORIDE 25 MG/ML
1 SOLUTION/ DROPS OPHTHALMIC
Status: CANCELLED | OUTPATIENT
Start: 2021-11-09 | End: 2021-11-09

## 2021-11-09 RX ORDER — TROPICAMIDE 10 MG/ML
1 SOLUTION/ DROPS OPHTHALMIC
Status: CANCELLED | OUTPATIENT
Start: 2021-11-09 | End: 2021-11-09

## 2021-11-09 NOTE — PERIOP NOTES
PAT - SURGICAL PRE-ADMISSION INSTRUCTIONS    NAME:  Rosalie Li                                                          TODAY'S DATE:  11/9/2021    SURGERY DATE:  11/17/2021                                  SURGERY ARRIVAL TIME:   TBA    1. Do NOT eat or drink anything, including candy or gum, after MIDNIGHT on 11-17 , unless you have specific instructions from your Surgeon or Anesthesia Provider to do so. 2. No smoking 24 hours before surgery. 3. No alcohol 24 hours prior to the day of surgery. 4. No recreational drugs for one week prior to the day of surgery. 5. Leave all valuables, including money/purse, at home. 6. Remove all jewelry, nail polish, makeup (including mascara); no lotions, powders, deodorant, or perfume/cologne/after shave. 7. Glasses/Contact lenses and Dentures may be worn to the hospital.  They will be removed prior to surgery. 8. Call your doctor if symptoms of a cold or illness develop within 24 ours prior to surgery. 9. AN ADULT MUST DRIVE YOU HOME AFTER OUTPATIENT SURGERY. 10. If you are having an OUTPATIENT procedure, please make arrangements for a responsible adult to be with you for 24 hours after your surgery. 11. If you are admitted to the hospital, you will be assigned to a bed after surgery is complete. Normally a family member will not be able to see you until you are in your assigned bed. 12. Visitation Restrictions Explained. Special Instructions:  Covid Test 11-12, Quarantine requirements discussed  Bring inhaler.      Pt in w/c due to spinal stenosis; states no difficulty with transferring self    Patient Prep:    No prep         These surgical instructions were reviewed with the patient during the PAT phone call

## 2021-11-12 ENCOUNTER — HOSPITAL ENCOUNTER (OUTPATIENT)
Dept: PREADMISSION TESTING | Age: 71
Discharge: HOME | End: 2021-11-12
Payer: MEDICARE

## 2021-11-12 PROCEDURE — U0003 INFECTIOUS AGENT DETECTION BY NUCLEIC ACID (DNA OR RNA); SEVERE ACUTE RESPIRATORY SYNDROME CORONAVIRUS 2 (SARS-COV-2) (CORONAVIRUS DISEASE [COVID-19]), AMPLIFIED PROBE TECHNIQUE, MAKING USE OF HIGH THROUGHPUT TECHNOLOGIES AS DESCRIBED BY CMS-2020-01-R: HCPCS

## 2021-11-13 LAB — SARS-COV-2, COV2NT: NOT DETECTED

## 2021-11-16 ENCOUNTER — ANESTHESIA EVENT (OUTPATIENT)
Dept: SURGERY | Age: 71
End: 2021-11-16
Payer: MEDICARE

## 2021-11-16 RX ORDER — DEXTROSE 50 % IN WATER (D50W) INTRAVENOUS SYRINGE
25-50 AS NEEDED
Status: CANCELLED | OUTPATIENT
Start: 2021-11-16

## 2021-11-16 RX ORDER — FLUMAZENIL 0.1 MG/ML
0.2 INJECTION INTRAVENOUS
Status: CANCELLED | OUTPATIENT
Start: 2021-11-16

## 2021-11-16 RX ORDER — MAGNESIUM SULFATE 100 %
4 CRYSTALS MISCELLANEOUS AS NEEDED
Status: CANCELLED | OUTPATIENT
Start: 2021-11-16

## 2021-11-16 RX ORDER — SODIUM CHLORIDE, SODIUM LACTATE, POTASSIUM CHLORIDE, CALCIUM CHLORIDE 600; 310; 30; 20 MG/100ML; MG/100ML; MG/100ML; MG/100ML
1000 INJECTION, SOLUTION INTRAVENOUS CONTINUOUS
Status: CANCELLED | OUTPATIENT
Start: 2021-11-16

## 2021-11-16 RX ORDER — SODIUM CHLORIDE 0.9 % (FLUSH) 0.9 %
5-40 SYRINGE (ML) INJECTION AS NEEDED
Status: CANCELLED | OUTPATIENT
Start: 2021-11-16

## 2021-11-16 RX ORDER — SODIUM CHLORIDE 0.9 % (FLUSH) 0.9 %
5-40 SYRINGE (ML) INJECTION EVERY 8 HOURS
Status: CANCELLED | OUTPATIENT
Start: 2021-11-16

## 2021-11-16 RX ORDER — NALOXONE HYDROCHLORIDE 0.4 MG/ML
0.1 INJECTION, SOLUTION INTRAMUSCULAR; INTRAVENOUS; SUBCUTANEOUS AS NEEDED
Status: CANCELLED | OUTPATIENT
Start: 2021-11-16

## 2021-11-17 ENCOUNTER — HOSPITAL ENCOUNTER (OUTPATIENT)
Age: 71
Setting detail: OUTPATIENT SURGERY
Discharge: HOME | End: 2021-11-17
Attending: OPHTHALMOLOGY | Admitting: OPHTHALMOLOGY
Payer: MEDICARE

## 2021-11-17 ENCOUNTER — ANESTHESIA (OUTPATIENT)
Dept: SURGERY | Age: 71
End: 2021-11-17
Payer: MEDICARE

## 2021-11-17 VITALS
SYSTOLIC BLOOD PRESSURE: 136 MMHG | HEIGHT: 63 IN | OXYGEN SATURATION: 98 % | TEMPERATURE: 97.2 F | HEART RATE: 78 BPM | RESPIRATION RATE: 18 BRPM | DIASTOLIC BLOOD PRESSURE: 74 MMHG | WEIGHT: 293 LBS | BODY MASS INDEX: 51.91 KG/M2

## 2021-11-17 LAB — GLUCOSE BLD STRIP.AUTO-MCNC: 115 MG/DL (ref 70–110)

## 2021-11-17 PROCEDURE — 2709999900 HC NON-CHARGEABLE SUPPLY: Performed by: OPHTHALMOLOGY

## 2021-11-17 PROCEDURE — V2632 POST CHMBR INTRAOCULAR LENS: HCPCS | Performed by: OPHTHALMOLOGY

## 2021-11-17 PROCEDURE — 77030018837 HC SOL IRR OPTH ALCN -A: Performed by: OPHTHALMOLOGY

## 2021-11-17 PROCEDURE — 77030040361 HC SLV COMPR DVT MDII -B: Performed by: OPHTHALMOLOGY

## 2021-11-17 PROCEDURE — 77030013851 HC PK PHACO KT ALCN -B: Performed by: OPHTHALMOLOGY

## 2021-11-17 PROCEDURE — 76210000021 HC REC RM PH II 0.5 TO 1 HR: Performed by: OPHTHALMOLOGY

## 2021-11-17 PROCEDURE — 74011000250 HC RX REV CODE- 250: Performed by: OPHTHALMOLOGY

## 2021-11-17 PROCEDURE — 74011250636 HC RX REV CODE- 250/636: Performed by: OPHTHALMOLOGY

## 2021-11-17 PROCEDURE — 74011250636 HC RX REV CODE- 250/636: Performed by: ANESTHESIOLOGY

## 2021-11-17 PROCEDURE — 77030031761 HC CYSTOTOM OPHTH IRR SYS BVTC -A: Performed by: OPHTHALMOLOGY

## 2021-11-17 PROCEDURE — 77030006685 HC BLD OPHTH ALCN -B: Performed by: OPHTHALMOLOGY

## 2021-11-17 PROCEDURE — 77030020782 HC GWN BAIR PAWS FLX 3M -B: Performed by: OPHTHALMOLOGY

## 2021-11-17 PROCEDURE — 82962 GLUCOSE BLOOD TEST: CPT

## 2021-11-17 PROCEDURE — 77030013389: Performed by: OPHTHALMOLOGY

## 2021-11-17 PROCEDURE — 76010000154 HC OR TIME FIRST 0.5 HR: Performed by: OPHTHALMOLOGY

## 2021-11-17 PROCEDURE — 76060000031 HC ANESTHESIA FIRST 0.5 HR: Performed by: OPHTHALMOLOGY

## 2021-11-17 DEVICE — ACRYSOF(R) SINGLE-PIECE ACRYLIC FOLDABLE IOL,UV-ABSORBING POSTERIOR CHAMBER LENS (IOL/PC),13.0MM LENGTH, 6.0MM BICONVEX OPTIC, PLANARHAPTICS.
Type: IMPLANTABLE DEVICE | Site: EYE | Status: FUNCTIONAL
Brand: ACRYSOF®

## 2021-11-17 RX ORDER — PHENYLEPHRINE HYDROCHLORIDE 25 MG/ML
1 SOLUTION/ DROPS OPHTHALMIC
Status: COMPLETED | OUTPATIENT
Start: 2021-11-17 | End: 2021-11-17

## 2021-11-17 RX ORDER — EPINEPHRINE 1 MG/ML
INJECTION, SOLUTION, CONCENTRATE INTRAVENOUS AS NEEDED
Status: DISCONTINUED | OUTPATIENT
Start: 2021-11-17 | End: 2021-11-17 | Stop reason: HOSPADM

## 2021-11-17 RX ORDER — OFLOXACIN 3 MG/ML
1 SOLUTION/ DROPS OPHTHALMIC ONCE
Status: COMPLETED | OUTPATIENT
Start: 2021-11-17 | End: 2021-11-17

## 2021-11-17 RX ORDER — MIDAZOLAM HYDROCHLORIDE 1 MG/ML
INJECTION, SOLUTION INTRAMUSCULAR; INTRAVENOUS AS NEEDED
Status: DISCONTINUED | OUTPATIENT
Start: 2021-11-17 | End: 2021-11-17 | Stop reason: HOSPADM

## 2021-11-17 RX ORDER — FENTANYL CITRATE 50 UG/ML
INJECTION, SOLUTION INTRAMUSCULAR; INTRAVENOUS AS NEEDED
Status: DISCONTINUED | OUTPATIENT
Start: 2021-11-17 | End: 2021-11-17 | Stop reason: HOSPADM

## 2021-11-17 RX ORDER — SODIUM CHLORIDE, SODIUM LACTATE, POTASSIUM CHLORIDE, CALCIUM CHLORIDE 600; 310; 30; 20 MG/100ML; MG/100ML; MG/100ML; MG/100ML
75 INJECTION, SOLUTION INTRAVENOUS CONTINUOUS
Status: DISCONTINUED | OUTPATIENT
Start: 2021-11-17 | End: 2021-11-17 | Stop reason: HOSPADM

## 2021-11-17 RX ORDER — TROPICAMIDE 10 MG/ML
1 SOLUTION/ DROPS OPHTHALMIC
Status: COMPLETED | OUTPATIENT
Start: 2021-11-17 | End: 2021-11-17

## 2021-11-17 RX ORDER — LIDOCAINE HYDROCHLORIDE 10 MG/ML
INJECTION, SOLUTION EPIDURAL; INFILTRATION; INTRACAUDAL; PERINEURAL AS NEEDED
Status: DISCONTINUED | OUTPATIENT
Start: 2021-11-17 | End: 2021-11-17 | Stop reason: HOSPADM

## 2021-11-17 RX ADMIN — TROPICAMIDE 1 DROP: 10 SOLUTION/ DROPS OPHTHALMIC at 11:38

## 2021-11-17 RX ADMIN — MIDAZOLAM 1 MG: 1 INJECTION INTRAMUSCULAR; INTRAVENOUS at 13:48

## 2021-11-17 RX ADMIN — TROPICAMIDE 1 DROP: 10 SOLUTION/ DROPS OPHTHALMIC at 11:55

## 2021-11-17 RX ADMIN — PHENYLEPHRINE HYDROCHLORIDE 1 DROP: 2.5 SOLUTION/ DROPS OPHTHALMIC at 11:38

## 2021-11-17 RX ADMIN — LIDOCAINE HYDROCHLORIDE 2 DROP: 35 GEL OPHTHALMIC at 11:56

## 2021-11-17 RX ADMIN — FENTANYL CITRATE 50 MCG: 50 INJECTION, SOLUTION INTRAMUSCULAR; INTRAVENOUS at 13:43

## 2021-11-17 RX ADMIN — FENTANYL CITRATE 50 MCG: 50 INJECTION, SOLUTION INTRAMUSCULAR; INTRAVENOUS at 13:39

## 2021-11-17 RX ADMIN — PHENYLEPHRINE HYDROCHLORIDE 1 DROP: 2.5 SOLUTION/ DROPS OPHTHALMIC at 11:50

## 2021-11-17 RX ADMIN — MIDAZOLAM 1 MG: 1 INJECTION INTRAMUSCULAR; INTRAVENOUS at 13:53

## 2021-11-17 RX ADMIN — MIDAZOLAM 2 MG: 1 INJECTION INTRAMUSCULAR; INTRAVENOUS at 13:34

## 2021-11-17 RX ADMIN — PHENYLEPHRINE HYDROCHLORIDE 1 DROP: 2.5 SOLUTION/ DROPS OPHTHALMIC at 11:55

## 2021-11-17 RX ADMIN — TROPICAMIDE 1 DROP: 10 SOLUTION/ DROPS OPHTHALMIC at 11:50

## 2021-11-17 RX ADMIN — PHENYLEPHRINE HYDROCHLORIDE 1 DROP: 2.5 SOLUTION/ DROPS OPHTHALMIC at 11:44

## 2021-11-17 RX ADMIN — OFLOXACIN 1 DROP: 3 SOLUTION/ DROPS OPHTHALMIC at 11:37

## 2021-11-17 RX ADMIN — TROPICAMIDE 1 DROP: 10 SOLUTION/ DROPS OPHTHALMIC at 11:44

## 2021-11-17 RX ADMIN — SODIUM CHLORIDE, POTASSIUM CHLORIDE, SODIUM LACTATE AND CALCIUM CHLORIDE 75 ML/HR: 600; 310; 30; 20 INJECTION, SOLUTION INTRAVENOUS at 12:00

## 2021-11-17 NOTE — ANESTHESIA POSTPROCEDURE EVALUATION
Procedure(s):  CATARACT EXTRACTION WITH INTRA OCULAR LENS IMPLANT- LEFT EYE \"SPEC POP\". MAC    Anesthesia Post Evaluation        Comments: Post-Anesthesia Evaluation and Assessment    Cardiovascular Function/Vital Signs  /74   Pulse 78   Temp 36.2 °C (97.2 °F)   Resp 18   Ht 5' 3\" (1.6 m)   Wt (!) 159.8 kg (352 lb 4.8 oz)   SpO2 98%   BMI 62.41 kg/m²     Patient is status post Procedure(s):  CATARACT EXTRACTION WITH INTRA OCULAR LENS IMPLANT- LEFT EYE \"SPEC POP\". Nausea/Vomiting: Controlled. Postoperative hydration reviewed and adequate. Pain:  Pain Scale 1: Numeric (0 - 10) (11/17/21 1435)  Pain Intensity 1: 0 (11/17/21 1435)   Managed. Neurological Status:   Neuro (WDL): Within Defined Limits (11/17/21 1149)   At baseline. Mental Status and Level of Consciousness: Arousable. Pulmonary Status:   O2 Device: None (Room air) (11/17/21 1435)   Adequate oxygenation and airway patent. Complications related to anesthesia: None    Post-anesthesia assessment completed. No concerns. Patient has met all discharge requirements.     Signed By: Lalitha Jonas MD    November 17, 2021                   INITIAL Post-op Vital signs:   Vitals Value Taken Time   /74 11/17/21 1435   Temp     Pulse 78 11/17/21 1435   Resp 18 11/17/21 1435   SpO2 98 % 11/17/21 1435

## 2021-11-17 NOTE — PERIOP NOTES
Reviewed PTA medication list with patient/caregiver and patient/caregiver denies any additional medications. Patient admits to having a responsible adult care for them at home for at least 24 hours after surgery. Patient encouraged to use gown warming system and informed that using said warming gown to regulate body temperature prior to a procedure has been shown to help reduce the risks of blood clots and infection. Patient's pharmacy of choice verified and documented in PTA medication section. Dual skin assessment & fall risk band verification completed with Calvin leigh RN.

## 2021-11-17 NOTE — INTERVAL H&P NOTE
Update History & Physical    The Patient's History and Physical of November 17, 2021 was reviewed with the patient and I examined the patient. There was no change. The surgical site was confirmed by the patient and me. Plan:  The risk, benefits, expected outcome, and alternative to the recommended procedure have been discussed with the patient. Patient understands and wants to proceed with the procedure.     Electronically signed by Cricket Thakur MD on 11/17/2021 at 11:38 AM

## 2021-11-17 NOTE — OP NOTES
Cataract Operative Note      Patient: Azalea Riley               Sex: female          DOA: 11/17/2021         YOB: 1950      Age:  78 y.o.        LOS:  LOS: 0 days     Preoperative Diagnosis: Cataract left eye    Postoperative Diagnosis:  Cataract  left eye  Surgeon: Issa Laird MD, M.D. Anesthesia:  Topical anesthesia  Procedure:  Phacoemulsification of posterior chamber for intraocular lens implantation left    Fluids:  0    Procedure in Detail: The operative eye was prepped and draped in the usual fashion. A lid speculum was placed in the operative eye. A clear cornea approach was utilized. A paracentesis incision(s) was constructed with a 1 mm slit knife. One percent preservative-free lidocaine followed by viscoelastic was instilled into the anterior chamber. A clear corneal incision was made with a slit knife. A continuous curvilinear capsulorrhexis was constructed followed by hydrodissection. A phacoemulsification tip was placed into the eye, and the lens nucleus was emulsified. The irrigation/aspiration device was then used to remove any remaining cortical material.  Polishing of the capsule was performed as needed. The intraocular lens was then placed into the capsular bag after it was re-inflated with viscoelastic. The remaining viscoelastic was then removed using the irrigation/aspiration device. BSS on a cannula was then used to hydrate the wound edges. At the end of the procedure the wound was found to be watertight, the anterior chamber was deep and the pupil round. No blood loss during surgery. An antibiotic and anti-inflammatroy was placed into the operative eye. The lid speculum was removed. Protective sunglasses were then placed onto the patient. The patient was taken to the [address] [place] [address] Unit (PACU) in good condition having tolerated the procedure well. Estimated Blood Loss: 0                 Implants:   Implant Name Type Inv.  Item Serial No.  Lot No. LRB No. Used Action   LENS INTOCU 6.0 TO 30.0 DIOPT 118.4 A CONSTANT 0DEG ANG - [id number]  LENS INTOCU 6.0 TO 30.0 DIOPT 118.4 A CONSTANT 0DEG ANG [id number] [employer]  Left 1 Implanted       Specimens: * No specimens in log *            Complications:  None           Mariia Ross MD, M.D.  [unfilled]  1:58 PM

## 2021-11-17 NOTE — DISCHARGE INSTRUCTIONS
Post-Operative Cataract Instructions  [facility] INVASIVE SURGERY [facility]  Jeanette Allen M.D. Rodolfo Malcolm. Mannie Medina M.D.  [address]  [phone]      Diet  1. Resume normal diet. 2. Do not drink alcoholic beverages, including beer for 24 hours. Activity  1. Do not drive a car or operate any hazardous machinery the day of surgery. 2. You may resume normal activities as tolerated. 3. No bending or heavy lifting. 4. No reading or computer work after your surgery. You may watch TV. Wound Care  1. Anticipate that your eye will tear and water. 2. You may also experience a sensation of a foreign body, sand, or grit in the surgical eye, this is normal.  3. Do not touch the affected eye. 4. ** Do not remove eye shield unless directed to do so by your physician. **  5. Wear eye shield when resting or sleeping for one week. Medications  1. Take Tylenol Extra Strength two (2) tablets by mouth upon arrival home and then every four (4) hours as needed for discomfort. 2. Regarding Eye drops:  -  Begin using your eye drops as directed by your physician in the (left) operative eye. One drop of     []   Zymar    [x]  Vigamox   along with one (1) drop     []  Acular    [x]  Voltaren   every four (4) hours while awake. Wait five (5) minutes then apply one (1) drop     []  Pred Forte    [x]  Econopred Plus   every four (4) hours while awake. 3. If you take glaucoma medications, continue to do so unless the physician otherwise instructs you. 4. You may use artificial tears as needed if your eye feels scratchy. Notify your Physician Immediately for any of the followin. Excessive pain not relieved by Tylenol especially headache or increasing pressure to the operative eye. 2. Persistent nausea lasting more than eight hours. 3. If any vomiting occurs. If any questions or concerns arise, call your Surgeon at [phone].

## 2021-11-17 NOTE — PERIOP NOTES
Reviewed discharge plan of care with patient and her caregiver. Written instructions provided as well. They verbalized understanding. Written instructions provided as well.  They verbalized understanding

## 2022-03-18 PROBLEM — H26.9 CATARACT: Status: ACTIVE | Noted: 2021-08-18

## 2022-03-19 PROBLEM — S42.302A FRACTURE OF LEFT HUMERUS: Status: ACTIVE | Noted: 2017-01-04

## 2022-03-19 PROBLEM — R79.89 ELEVATED LFTS: Status: ACTIVE | Noted: 2017-01-04

## 2022-03-19 PROBLEM — M79.2 NEUROPATHIC PAIN: Status: ACTIVE | Noted: 2017-01-13

## 2022-03-19 PROBLEM — R09.02 HYPOXIA: Status: ACTIVE | Noted: 2017-01-04

## 2022-03-19 PROBLEM — R29.6 FREQUENT FALLS: Status: ACTIVE | Noted: 2017-01-04

## 2022-03-19 PROBLEM — E66.2 OBESITY HYPOVENTILATION SYNDROME (HCC): Status: ACTIVE | Noted: 2017-01-13

## 2022-03-19 PROBLEM — Z74.09 IMMOBILITY: Status: ACTIVE | Noted: 2017-01-04

## 2022-03-20 PROBLEM — N39.0 UTI (URINARY TRACT INFECTION): Status: ACTIVE | Noted: 2017-01-04

## 2023-06-13 ENCOUNTER — HOSPITAL ENCOUNTER (INPATIENT)
Facility: HOSPITAL | Age: 73
LOS: 8 days | Discharge: NURSING, REHAB OR LONG-TERM CARE | DRG: 871 | End: 2023-06-21
Attending: STUDENT IN AN ORGANIZED HEALTH CARE EDUCATION/TRAINING PROGRAM | Admitting: INTERNAL MEDICINE
Payer: MEDICARE

## 2023-06-13 ENCOUNTER — APPOINTMENT (OUTPATIENT)
Facility: HOSPITAL | Age: 73
DRG: 871 | End: 2023-06-13
Payer: MEDICARE

## 2023-06-13 ENCOUNTER — APPOINTMENT (OUTPATIENT)
Facility: HOSPITAL | Age: 73
DRG: 871 | End: 2023-06-13
Attending: INTERNAL MEDICINE
Payer: MEDICARE

## 2023-06-13 ENCOUNTER — APPOINTMENT (OUTPATIENT)
Facility: HOSPITAL | Age: 73
DRG: 871 | End: 2023-06-13
Attending: STUDENT IN AN ORGANIZED HEALTH CARE EDUCATION/TRAINING PROGRAM
Payer: MEDICARE

## 2023-06-13 DIAGNOSIS — J18.9 PNEUMONIA OF RIGHT LUNG DUE TO INFECTIOUS ORGANISM, UNSPECIFIED PART OF LUNG: Primary | ICD-10-CM

## 2023-06-13 DIAGNOSIS — J96.01 ACUTE RESPIRATORY FAILURE WITH HYPOXIA AND HYPERCAPNIA (HCC): ICD-10-CM

## 2023-06-13 DIAGNOSIS — J96.02 ACUTE RESPIRATORY FAILURE WITH HYPOXIA AND HYPERCAPNIA (HCC): ICD-10-CM

## 2023-06-13 DIAGNOSIS — A41.9 SEPTICEMIA (HCC): ICD-10-CM

## 2023-06-13 PROBLEM — R65.21 SEPTIC SHOCK (HCC): Status: ACTIVE | Noted: 2023-06-13

## 2023-06-13 PROBLEM — E11.9 CONTROLLED TYPE 2 DIABETES MELLITUS, WITHOUT LONG-TERM CURRENT USE OF INSULIN (HCC): Status: ACTIVE | Noted: 2023-06-13

## 2023-06-13 PROBLEM — M79.2 NEUROPATHIC PAIN: Status: ACTIVE | Noted: 2017-01-13

## 2023-06-13 PROBLEM — R57.9 SHOCK (HCC): Status: ACTIVE | Noted: 2023-06-13

## 2023-06-13 PROBLEM — S42.302A FRACTURE OF LEFT HUMERUS: Status: ACTIVE | Noted: 2017-01-04

## 2023-06-13 PROBLEM — J45.909 ASTHMA: Status: ACTIVE | Noted: 2023-06-13

## 2023-06-13 PROBLEM — H26.9 CATARACT: Status: ACTIVE | Noted: 2021-08-18

## 2023-06-13 PROBLEM — E66.2 OBESITY HYPOVENTILATION SYNDROME (HCC): Status: ACTIVE | Noted: 2017-01-13

## 2023-06-13 LAB
ALBUMIN SERPL-MCNC: 3.3 G/DL (ref 3.4–5)
ALBUMIN/GLOB SERPL: 0.9 (ref 0.8–1.7)
ALP SERPL-CCNC: 80 U/L (ref 45–117)
ALT SERPL-CCNC: 28 U/L (ref 13–56)
AMPHET UR QL SCN: NEGATIVE
ANION GAP BLD CALC-SCNC: ABNORMAL (ref 10–20)
ANION GAP SERPL CALC-SCNC: 7 MMOL/L (ref 3–18)
APPEARANCE UR: ABNORMAL
ARTERIAL PATENCY WRIST A: POSITIVE
AST SERPL-CCNC: 36 U/L (ref 10–38)
BACTERIA URNS QL MICRO: ABNORMAL /HPF
BARBITURATES UR QL SCN: NEGATIVE
BASE DEFICIT BLD-SCNC: 4 MMOL/L
BASOPHILS # BLD: 0 K/UL (ref 0–0.1)
BASOPHILS NFR BLD: 0 % (ref 0–2)
BDY SITE: ABNORMAL
BENZODIAZ UR QL: NEGATIVE
BILIRUB SERPL-MCNC: 0.5 MG/DL (ref 0.2–1)
BILIRUB UR QL: ABNORMAL
BUN SERPL-MCNC: 18 MG/DL (ref 7–18)
BUN/CREAT SERPL: 18 (ref 12–20)
CA-I BLD-MCNC: 1.28 MMOL/L (ref 1.12–1.32)
CALCIUM SERPL-MCNC: 9.3 MG/DL (ref 8.5–10.1)
CANNABINOIDS UR QL SCN: NEGATIVE
CHLORIDE BLD-SCNC: 109 MMOL/L (ref 98–107)
CHLORIDE SERPL-SCNC: 110 MMOL/L (ref 100–111)
CO2 BLD-SCNC: 24 MMOL/L (ref 19–24)
CO2 SERPL-SCNC: 26 MMOL/L (ref 21–32)
COCAINE UR QL SCN: NEGATIVE
COLOR UR: ABNORMAL
CREAT BLD-MCNC: 1.02 MG/DL (ref 0.6–1.3)
CREAT SERPL-MCNC: 1 MG/DL (ref 0.6–1.3)
DIFFERENTIAL METHOD BLD: ABNORMAL
ECHO AV MEAN GRADIENT: 13 MMHG
ECHO AV MEAN VELOCITY: 1.7 M/S
ECHO AV PEAK GRADIENT: 23 MMHG
ECHO AV PEAK VELOCITY: 2.4 M/S
ECHO AV VELOCITY RATIO: 0.67
ECHO AV VTI: 43.2 CM
ECHO BSA: 2.17 M2
ECHO IVC PROX: 3 CM
ECHO LA VOL 4C: 37 ML (ref 22–52)
ECHO LA VOLUME INDEX A4C: 18 ML/M2 (ref 16–34)
ECHO LV E' LATERAL VELOCITY: 13 CM/S
ECHO LV E' SEPTAL VELOCITY: 9 CM/S
ECHO LV EDV A4C: 79 ML
ECHO LV EDV INDEX A4C: 38 ML/M2
ECHO LV EJECTION FRACTION A4C: 79 %
ECHO LV ESV A4C: 16 ML
ECHO LV ESV INDEX A4C: 8 ML/M2
ECHO LVOT AV VTI INDEX: 0.67
ECHO LVOT MEAN GRADIENT: 6 MMHG
ECHO LVOT PEAK GRADIENT: 10 MMHG
ECHO LVOT PEAK VELOCITY: 1.6 M/S
ECHO LVOT VTI: 29 CM
ECHO MV A VELOCITY: 1.29 M/S
ECHO MV E DECELERATION TIME (DT): 161.2 MS
ECHO MV E VELOCITY: 1.3 M/S
ECHO MV E/A RATIO: 1.01
ECHO MV E/E' LATERAL: 10
ECHO MV E/E' RATIO (AVERAGED): 12.22
ECHO MV E/E' SEPTAL: 14.44
ECHO RA END SYSTOLIC VOLUME APICAL 4 CHAMBER INDEX BSA: 21 ML/M2
ECHO RA VOLUME: 43 ML
ECHO RV FREE WALL PEAK S': 15 CM/S
ECHO RV TAPSE: 2.1 CM (ref 1.7–?)
EKG ATRIAL RATE: 116 BPM
EKG DIAGNOSIS: NORMAL
EKG P AXIS: 80 DEGREES
EKG P-R INTERVAL: 194 MS
EKG Q-T INTERVAL: 306 MS
EKG QRS DURATION: 98 MS
EKG QTC CALCULATION (BAZETT): 425 MS
EKG R AXIS: -6 DEGREES
EKG T AXIS: 74 DEGREES
EKG VENTRICULAR RATE: 116 BPM
EOSINOPHIL # BLD: 0 K/UL (ref 0–0.4)
EOSINOPHIL NFR BLD: 0 % (ref 0–5)
EPITH CASTS URNS QL MICRO: ABNORMAL /LPF (ref 0–5)
ERYTHROCYTE [DISTWIDTH] IN BLOOD BY AUTOMATED COUNT: 13.6 % (ref 11.6–14.5)
EST. AVERAGE GLUCOSE BLD GHB EST-MCNC: 120 MG/DL
FIO2 ON VENT: 100 %
FLUAV RNA SPEC QL NAA+PROBE: NOT DETECTED
FLUBV RNA SPEC QL NAA+PROBE: NOT DETECTED
GAS FLOW.O2 O2 DELIVERY SYS: ABNORMAL
GLOBULIN SER CALC-MCNC: 3.5 G/DL (ref 2–4)
GLUCOSE BLD STRIP.AUTO-MCNC: 116 MG/DL (ref 70–110)
GLUCOSE BLD STRIP.AUTO-MCNC: 144 MG/DL (ref 70–110)
GLUCOSE BLD STRIP.AUTO-MCNC: 158 MG/DL (ref 70–110)
GLUCOSE BLD-MCNC: 139 MG/DL (ref 65–100)
GLUCOSE SERPL-MCNC: 108 MG/DL (ref 74–99)
GLUCOSE UR STRIP.AUTO-MCNC: NEGATIVE MG/DL
HBA1C MFR BLD: 5.8 % (ref 4.2–5.6)
HCO3 BLD-SCNC: 23.4 MMOL/L (ref 22–26)
HCT VFR BLD AUTO: 49.5 % (ref 35–45)
HGB BLD-MCNC: 15.4 G/DL (ref 12–16)
HGB UR QL STRIP: NEGATIVE
IMM GRANULOCYTES # BLD AUTO: 0 K/UL (ref 0–0.04)
IMM GRANULOCYTES NFR BLD AUTO: 0 % (ref 0–0.5)
INR PPP: 1.1 (ref 0.8–1.2)
IPAP/PIP: 21
KETONES UR QL STRIP.AUTO: ABNORMAL MG/DL
L PNEUMO AG UR QL IA: NEGATIVE
LACTATE BLD-SCNC: 2.46 MMOL/L (ref 0.4–2)
LACTATE SERPL-SCNC: 1.3 MMOL/L (ref 0.4–2)
LACTATE SERPL-SCNC: 1.3 MMOL/L (ref 0.4–2)
LACTATE SERPL-SCNC: 1.6 MMOL/L (ref 0.4–2)
LACTATE SERPL-SCNC: 3.8 MMOL/L (ref 0.4–2)
LEUKOCYTE ESTERASE UR QL STRIP.AUTO: ABNORMAL
LYMPHOCYTES # BLD: 1.1 K/UL (ref 0.9–3.6)
LYMPHOCYTES NFR BLD: 25 % (ref 21–52)
Lab: NORMAL
MAGNESIUM SERPL-MCNC: 1.4 MG/DL (ref 1.6–2.6)
MCH RBC QN AUTO: 30 PG (ref 24–34)
MCHC RBC AUTO-ENTMCNC: 31.1 G/DL (ref 31–37)
MCV RBC AUTO: 96.5 FL (ref 78–100)
METHADONE UR QL: NEGATIVE
MONOCYTES # BLD: 0.1 K/UL (ref 0.05–1.2)
MONOCYTES NFR BLD: 2 % (ref 3–10)
MUCOUS THREADS URNS QL MICRO: ABNORMAL /LPF
NEUTS SEG # BLD: 3.3 K/UL (ref 1.8–8)
NEUTS SEG NFR BLD: 73 % (ref 40–73)
NITRITE UR QL STRIP.AUTO: NEGATIVE
NRBC # BLD: 0 K/UL (ref 0–0.01)
NRBC BLD-RTO: 0 PER 100 WBC
NT PRO BNP: 112 PG/ML (ref 0–900)
OPIATES UR QL: NEGATIVE
PCO2 BLD: 49.8 MMHG (ref 35–45)
PCP UR QL: NEGATIVE
PEEP RESPIRATORY: 8
PH BLD: 7.28 (ref 7.35–7.45)
PH UR STRIP: 5 (ref 5–8)
PHOSPHATE SERPL-MCNC: 3 MG/DL (ref 2.5–4.9)
PLATELET # BLD AUTO: 217 K/UL (ref 135–420)
PMV BLD AUTO: 12.4 FL (ref 9.2–11.8)
PO2 BLD: 81 MMHG (ref 80–100)
POTASSIUM BLD-SCNC: 3.3 MMOL/L (ref 3.5–5.1)
POTASSIUM SERPL-SCNC: 3.8 MMOL/L (ref 3.5–5.5)
PRESSURE SUPPORT SETTING VENT: 10
PROCALCITONIN SERPL-MCNC: 31.48 NG/ML
PROT SERPL-MCNC: 6.8 G/DL (ref 6.4–8.2)
PROT UR STRIP-MCNC: 30 MG/DL
PROTHROMBIN TIME: 14.3 SEC (ref 11.5–15.2)
RBC # BLD AUTO: 5.13 M/UL (ref 4.2–5.3)
RBC #/AREA URNS HPF: NEGATIVE /HPF (ref 0–5)
S PNEUM AG UR QL: NEGATIVE
SAO2 % BLD: 94 %
SARS-COV-2 RNA RESP QL NAA+PROBE: NOT DETECTED
SERVICE CMNT-IMP: ABNORMAL
SODIUM BLD-SCNC: 144 MMOL/L (ref 136–145)
SODIUM SERPL-SCNC: 143 MMOL/L (ref 136–145)
SP GR UR REFRACTOMETRY: 1.02 (ref 1–1.03)
SPECIMEN SITE: ABNORMAL
TROPONIN I SERPL HS-MCNC: 30 NG/L (ref 0–54)
TROPONIN I SERPL HS-MCNC: 8 NG/L (ref 0–54)
UROBILINOGEN UR QL STRIP.AUTO: 0.2 EU/DL (ref 0.2–1)
VT SETTING VENT: 661
WBC # BLD AUTO: 4.5 K/UL (ref 4.6–13.2)
WBC URNS QL MICRO: ABNORMAL /HPF (ref 0–5)

## 2023-06-13 PROCEDURE — 87449 NOS EACH ORGANISM AG IA: CPT

## 2023-06-13 PROCEDURE — 85025 COMPLETE CBC W/AUTO DIFF WBC: CPT

## 2023-06-13 PROCEDURE — 96361 HYDRATE IV INFUSION ADD-ON: CPT

## 2023-06-13 PROCEDURE — 71045 X-RAY EXAM CHEST 1 VIEW: CPT

## 2023-06-13 PROCEDURE — 84132 ASSAY OF SERUM POTASSIUM: CPT

## 2023-06-13 PROCEDURE — 84100 ASSAY OF PHOSPHORUS: CPT

## 2023-06-13 PROCEDURE — 93005 ELECTROCARDIOGRAM TRACING: CPT | Performed by: STUDENT IN AN ORGANIZED HEALTH CARE EDUCATION/TRAINING PROGRAM

## 2023-06-13 PROCEDURE — 83880 ASSAY OF NATRIURETIC PEPTIDE: CPT

## 2023-06-13 PROCEDURE — 83605 ASSAY OF LACTIC ACID: CPT

## 2023-06-13 PROCEDURE — 80307 DRUG TEST PRSMV CHEM ANLYZR: CPT

## 2023-06-13 PROCEDURE — 6360000004 HC RX CONTRAST MEDICATION: Performed by: INTERNAL MEDICINE

## 2023-06-13 PROCEDURE — 6370000000 HC RX 637 (ALT 250 FOR IP): Performed by: INTERNAL MEDICINE

## 2023-06-13 PROCEDURE — P9047 ALBUMIN (HUMAN), 25%, 50ML: HCPCS | Performed by: INTERNAL MEDICINE

## 2023-06-13 PROCEDURE — 6360000002 HC RX W HCPCS: Performed by: INTERNAL MEDICINE

## 2023-06-13 PROCEDURE — 5A09357 ASSISTANCE WITH RESPIRATORY VENTILATION, LESS THAN 24 CONSECUTIVE HOURS, CONTINUOUS POSITIVE AIRWAY PRESSURE: ICD-10-PCS | Performed by: INTERNAL MEDICINE

## 2023-06-13 PROCEDURE — 82803 BLOOD GASES ANY COMBINATION: CPT

## 2023-06-13 PROCEDURE — 84145 PROCALCITONIN (PCT): CPT

## 2023-06-13 PROCEDURE — 2500000003 HC RX 250 WO HCPCS: Performed by: INTERNAL MEDICINE

## 2023-06-13 PROCEDURE — 93010 ELECTROCARDIOGRAM REPORT: CPT | Performed by: INTERNAL MEDICINE

## 2023-06-13 PROCEDURE — 96367 TX/PROPH/DG ADDL SEQ IV INF: CPT

## 2023-06-13 PROCEDURE — 2000000000 HC ICU R&B

## 2023-06-13 PROCEDURE — 2700000000 HC OXYGEN THERAPY PER DAY

## 2023-06-13 PROCEDURE — 93970 EXTREMITY STUDY: CPT

## 2023-06-13 PROCEDURE — 87086 URINE CULTURE/COLONY COUNT: CPT

## 2023-06-13 PROCEDURE — 6370000000 HC RX 637 (ALT 250 FOR IP): Performed by: FAMILY MEDICINE

## 2023-06-13 PROCEDURE — 82962 GLUCOSE BLOOD TEST: CPT

## 2023-06-13 PROCEDURE — 6370000000 HC RX 637 (ALT 250 FOR IP): Performed by: HOSPITALIST

## 2023-06-13 PROCEDURE — 2500000003 HC RX 250 WO HCPCS: Performed by: STUDENT IN AN ORGANIZED HEALTH CARE EDUCATION/TRAINING PROGRAM

## 2023-06-13 PROCEDURE — 96375 TX/PRO/DX INJ NEW DRUG ADDON: CPT

## 2023-06-13 PROCEDURE — 36556 INSERT NON-TUNNEL CV CATH: CPT

## 2023-06-13 PROCEDURE — 81001 URINALYSIS AUTO W/SCOPE: CPT

## 2023-06-13 PROCEDURE — 2580000003 HC RX 258: Performed by: INTERNAL MEDICINE

## 2023-06-13 PROCEDURE — 84484 ASSAY OF TROPONIN QUANT: CPT

## 2023-06-13 PROCEDURE — 83036 HEMOGLOBIN GLYCOSYLATED A1C: CPT

## 2023-06-13 PROCEDURE — 83735 ASSAY OF MAGNESIUM: CPT

## 2023-06-13 PROCEDURE — 94660 CPAP INITIATION&MGMT: CPT

## 2023-06-13 PROCEDURE — 96365 THER/PROPH/DIAG IV INF INIT: CPT

## 2023-06-13 PROCEDURE — 96366 THER/PROPH/DIAG IV INF ADDON: CPT

## 2023-06-13 PROCEDURE — 2580000003 HC RX 258: Performed by: STUDENT IN AN ORGANIZED HEALTH CARE EDUCATION/TRAINING PROGRAM

## 2023-06-13 PROCEDURE — 84295 ASSAY OF SERUM SODIUM: CPT

## 2023-06-13 PROCEDURE — 82330 ASSAY OF CALCIUM: CPT

## 2023-06-13 PROCEDURE — 99285 EMERGENCY DEPT VISIT HI MDM: CPT

## 2023-06-13 PROCEDURE — 85610 PROTHROMBIN TIME: CPT

## 2023-06-13 PROCEDURE — 94640 AIRWAY INHALATION TREATMENT: CPT

## 2023-06-13 PROCEDURE — 2580000003 HC RX 258: Performed by: HOSPITALIST

## 2023-06-13 PROCEDURE — 6360000002 HC RX W HCPCS: Performed by: HOSPITALIST

## 2023-06-13 PROCEDURE — 82947 ASSAY GLUCOSE BLOOD QUANT: CPT

## 2023-06-13 PROCEDURE — 6360000002 HC RX W HCPCS: Performed by: STUDENT IN AN ORGANIZED HEALTH CARE EDUCATION/TRAINING PROGRAM

## 2023-06-13 PROCEDURE — 80053 COMPREHEN METABOLIC PANEL: CPT

## 2023-06-13 PROCEDURE — 93306 TTE W/DOPPLER COMPLETE: CPT | Performed by: INTERNAL MEDICINE

## 2023-06-13 PROCEDURE — 87040 BLOOD CULTURE FOR BACTERIA: CPT

## 2023-06-13 PROCEDURE — C8929 TTE W OR WO FOL WCON,DOPPLER: HCPCS

## 2023-06-13 PROCEDURE — 87636 SARSCOV2 & INF A&B AMP PRB: CPT

## 2023-06-13 RX ORDER — TRAZODONE HYDROCHLORIDE 100 MG/1
200 TABLET ORAL NIGHTLY
COMMUNITY

## 2023-06-13 RX ORDER — SODIUM CHLORIDE 0.9 % (FLUSH) 0.9 %
5-40 SYRINGE (ML) INJECTION EVERY 12 HOURS SCHEDULED
Status: DISCONTINUED | OUTPATIENT
Start: 2023-06-13 | End: 2023-06-21 | Stop reason: HOSPADM

## 2023-06-13 RX ORDER — BUSPIRONE HYDROCHLORIDE 15 MG/1
15 TABLET ORAL 2 TIMES DAILY
COMMUNITY

## 2023-06-13 RX ORDER — SODIUM CHLORIDE, SODIUM LACTATE, POTASSIUM CHLORIDE, AND CALCIUM CHLORIDE .6; .31; .03; .02 G/100ML; G/100ML; G/100ML; G/100ML
1000 INJECTION, SOLUTION INTRAVENOUS ONCE
Status: COMPLETED | OUTPATIENT
Start: 2023-06-13 | End: 2023-06-13

## 2023-06-13 RX ORDER — ONDANSETRON 2 MG/ML
4 INJECTION INTRAMUSCULAR; INTRAVENOUS EVERY 6 HOURS PRN
Status: DISCONTINUED | OUTPATIENT
Start: 2023-06-13 | End: 2023-06-21 | Stop reason: HOSPADM

## 2023-06-13 RX ORDER — ACETAMINOPHEN 650 MG/1
650 SUPPOSITORY RECTAL EVERY 6 HOURS PRN
Status: DISCONTINUED | OUTPATIENT
Start: 2023-06-13 | End: 2023-06-21 | Stop reason: HOSPADM

## 2023-06-13 RX ORDER — SODIUM CHLORIDE 0.9 % (FLUSH) 0.9 %
5-40 SYRINGE (ML) INJECTION PRN
Status: DISCONTINUED | OUTPATIENT
Start: 2023-06-13 | End: 2023-06-21 | Stop reason: HOSPADM

## 2023-06-13 RX ORDER — DEXTROSE MONOHYDRATE 100 MG/ML
INJECTION, SOLUTION INTRAVENOUS CONTINUOUS PRN
Status: DISCONTINUED | OUTPATIENT
Start: 2023-06-13 | End: 2023-06-21 | Stop reason: HOSPADM

## 2023-06-13 RX ORDER — BUSPIRONE HYDROCHLORIDE 5 MG/1
15 TABLET ORAL 2 TIMES DAILY
Status: DISCONTINUED | OUTPATIENT
Start: 2023-06-13 | End: 2023-06-21 | Stop reason: HOSPADM

## 2023-06-13 RX ORDER — POTASSIUM CHLORIDE 29.8 MG/ML
20 INJECTION INTRAVENOUS PRN
Status: DISCONTINUED | OUTPATIENT
Start: 2023-06-13 | End: 2023-06-21 | Stop reason: HOSPADM

## 2023-06-13 RX ORDER — IPRATROPIUM BROMIDE AND ALBUTEROL SULFATE 2.5; .5 MG/3ML; MG/3ML
1 SOLUTION RESPIRATORY (INHALATION) 3 TIMES DAILY
Status: DISCONTINUED | OUTPATIENT
Start: 2023-06-13 | End: 2023-06-13

## 2023-06-13 RX ORDER — SODIUM CHLORIDE 9 MG/ML
INJECTION, SOLUTION INTRAVENOUS PRN
Status: DISCONTINUED | OUTPATIENT
Start: 2023-06-13 | End: 2023-06-21 | Stop reason: HOSPADM

## 2023-06-13 RX ORDER — ONDANSETRON 4 MG/1
4 TABLET, ORALLY DISINTEGRATING ORAL EVERY 8 HOURS PRN
Status: DISCONTINUED | OUTPATIENT
Start: 2023-06-13 | End: 2023-06-21 | Stop reason: HOSPADM

## 2023-06-13 RX ORDER — MECOBALAMIN 5000 MCG
10 TABLET,DISINTEGRATING ORAL NIGHTLY
Status: DISCONTINUED | OUTPATIENT
Start: 2023-06-13 | End: 2023-06-21 | Stop reason: HOSPADM

## 2023-06-13 RX ORDER — IPRATROPIUM BROMIDE AND ALBUTEROL SULFATE 2.5; .5 MG/3ML; MG/3ML
1 SOLUTION RESPIRATORY (INHALATION) 3 TIMES DAILY
Status: DISCONTINUED | OUTPATIENT
Start: 2023-06-14 | End: 2023-06-21 | Stop reason: HOSPADM

## 2023-06-13 RX ORDER — POTASSIUM CHLORIDE 7.45 MG/ML
10 INJECTION INTRAVENOUS PRN
Status: DISCONTINUED | OUTPATIENT
Start: 2023-06-13 | End: 2023-06-21 | Stop reason: HOSPADM

## 2023-06-13 RX ORDER — NOREPINEPHRINE BITARTRATE 0.06 MG/ML
1-30 INJECTION, SOLUTION INTRAVENOUS CONTINUOUS
Status: DISCONTINUED | OUTPATIENT
Start: 2023-06-13 | End: 2023-06-15

## 2023-06-13 RX ORDER — SODIUM CHLORIDE, SODIUM LACTATE, POTASSIUM CHLORIDE, AND CALCIUM CHLORIDE .6; .31; .03; .02 G/100ML; G/100ML; G/100ML; G/100ML
30 INJECTION, SOLUTION INTRAVENOUS ONCE
Status: COMPLETED | OUTPATIENT
Start: 2023-06-13 | End: 2023-06-13

## 2023-06-13 RX ORDER — MAGNESIUM SULFATE IN WATER 40 MG/ML
2000 INJECTION, SOLUTION INTRAVENOUS PRN
Status: DISCONTINUED | OUTPATIENT
Start: 2023-06-13 | End: 2023-06-21 | Stop reason: HOSPADM

## 2023-06-13 RX ORDER — ARFORMOTEROL TARTRATE 15 UG/2ML
15 SOLUTION RESPIRATORY (INHALATION) 2 TIMES DAILY
Status: DISCONTINUED | OUTPATIENT
Start: 2023-06-13 | End: 2023-06-21 | Stop reason: HOSPADM

## 2023-06-13 RX ORDER — INSULIN LISPRO 100 [IU]/ML
0-8 INJECTION, SOLUTION INTRAVENOUS; SUBCUTANEOUS EVERY 4 HOURS
Status: DISCONTINUED | OUTPATIENT
Start: 2023-06-13 | End: 2023-06-18

## 2023-06-13 RX ORDER — ACETAMINOPHEN 325 MG/1
650 TABLET ORAL EVERY 6 HOURS PRN
Status: DISCONTINUED | OUTPATIENT
Start: 2023-06-13 | End: 2023-06-21 | Stop reason: HOSPADM

## 2023-06-13 RX ORDER — FLUTICASONE FUROATE, UMECLIDINIUM BROMIDE AND VILANTEROL TRIFENATATE 200; 62.5; 25 UG/1; UG/1; UG/1
1 POWDER RESPIRATORY (INHALATION) DAILY
COMMUNITY

## 2023-06-13 RX ORDER — POLYETHYLENE GLYCOL 3350 17 G/17G
17 POWDER, FOR SOLUTION ORAL DAILY PRN
Status: DISCONTINUED | OUTPATIENT
Start: 2023-06-13 | End: 2023-06-21 | Stop reason: HOSPADM

## 2023-06-13 RX ORDER — GABAPENTIN 300 MG/1
600 CAPSULE ORAL 2 TIMES DAILY
Status: DISCONTINUED | OUTPATIENT
Start: 2023-06-13 | End: 2023-06-21 | Stop reason: HOSPADM

## 2023-06-13 RX ORDER — ROPINIROLE 1 MG/1
1 TABLET, FILM COATED ORAL 3 TIMES DAILY
Status: DISCONTINUED | OUTPATIENT
Start: 2023-06-13 | End: 2023-06-21 | Stop reason: HOSPADM

## 2023-06-13 RX ORDER — BUDESONIDE 0.25 MG/2ML
0.25 INHALANT ORAL 2 TIMES DAILY
Status: DISCONTINUED | OUTPATIENT
Start: 2023-06-13 | End: 2023-06-21 | Stop reason: HOSPADM

## 2023-06-13 RX ORDER — ALBUMIN (HUMAN) 12.5 G/50ML
12.5 SOLUTION INTRAVENOUS EVERY 6 HOURS
Status: DISCONTINUED | OUTPATIENT
Start: 2023-06-13 | End: 2023-06-16

## 2023-06-13 RX ORDER — ENOXAPARIN SODIUM 150 MG/ML
1 INJECTION SUBCUTANEOUS 2 TIMES DAILY
Status: DISCONTINUED | OUTPATIENT
Start: 2023-06-13 | End: 2023-06-14

## 2023-06-13 RX ORDER — DEXAMETHASONE SODIUM PHOSPHATE 10 MG/ML
10 INJECTION, SOLUTION INTRAMUSCULAR; INTRAVENOUS
Status: COMPLETED | OUTPATIENT
Start: 2023-06-13 | End: 2023-06-13

## 2023-06-13 RX ORDER — SERTRALINE HYDROCHLORIDE 100 MG/1
100 TABLET, FILM COATED ORAL DAILY
COMMUNITY

## 2023-06-13 RX ADMIN — ENOXAPARIN SODIUM 105 MG: 120 INJECTION SUBCUTANEOUS at 21:37

## 2023-06-13 RX ADMIN — ARFORMOTEROL TARTRATE 15 MCG: 15 SOLUTION RESPIRATORY (INHALATION) at 19:16

## 2023-06-13 RX ADMIN — VANCOMYCIN HYDROCHLORIDE 2000 MG: 10 INJECTION, POWDER, LYOPHILIZED, FOR SOLUTION INTRAVENOUS at 12:35

## 2023-06-13 RX ADMIN — Medication 10 MG: at 21:55

## 2023-06-13 RX ADMIN — ALBUMIN (HUMAN) 12.5 G: 0.25 INJECTION, SOLUTION INTRAVENOUS at 17:40

## 2023-06-13 RX ADMIN — MAGNESIUM SULFATE HEPTAHYDRATE 2000 MG: 40 INJECTION, SOLUTION INTRAVENOUS at 19:24

## 2023-06-13 RX ADMIN — MEROPENEM 2000 MG: 1 INJECTION, POWDER, FOR SOLUTION INTRAVENOUS at 11:26

## 2023-06-13 RX ADMIN — GABAPENTIN 600 MG: 300 CAPSULE ORAL at 21:55

## 2023-06-13 RX ADMIN — MEROPENEM 1000 MG: 1 INJECTION, POWDER, FOR SOLUTION INTRAVENOUS at 23:24

## 2023-06-13 RX ADMIN — IPRATROPIUM BROMIDE AND ALBUTEROL SULFATE 1 DOSE: .5; 3 SOLUTION RESPIRATORY (INHALATION) at 17:04

## 2023-06-13 RX ADMIN — WATER 40 MG: 1 INJECTION INTRAMUSCULAR; INTRAVENOUS; SUBCUTANEOUS at 23:25

## 2023-06-13 RX ADMIN — ACETAMINOPHEN 650 MG: 325 TABLET ORAL at 21:55

## 2023-06-13 RX ADMIN — Medication 5 MCG/MIN: at 14:26

## 2023-06-13 RX ADMIN — SODIUM CHLORIDE, SODIUM LACTATE, POTASSIUM CHLORIDE, AND CALCIUM CHLORIDE 1000 ML: 600; 310; 30; 20 INJECTION, SOLUTION INTRAVENOUS at 11:24

## 2023-06-13 RX ADMIN — DEXAMETHASONE SODIUM PHOSPHATE 10 MG: 10 INJECTION, SOLUTION INTRAMUSCULAR; INTRAVENOUS at 11:08

## 2023-06-13 RX ADMIN — DOXYCYCLINE 100 MG: 100 INJECTION, POWDER, LYOPHILIZED, FOR SOLUTION INTRAVENOUS at 17:40

## 2023-06-13 RX ADMIN — ALBUMIN (HUMAN) 12.5 G: 0.25 INJECTION, SOLUTION INTRAVENOUS at 21:38

## 2023-06-13 RX ADMIN — SODIUM CHLORIDE, PRESERVATIVE FREE 10 ML: 5 INJECTION INTRAVENOUS at 21:55

## 2023-06-13 RX ADMIN — PERFLUTREN 1.5 ML: 6.52 INJECTION, SUSPENSION INTRAVENOUS at 21:11

## 2023-06-13 RX ADMIN — MEROPENEM 1000 MG: 1 INJECTION, POWDER, FOR SOLUTION INTRAVENOUS at 17:57

## 2023-06-13 RX ADMIN — WATER 40 MG: 1 INJECTION INTRAMUSCULAR; INTRAVENOUS; SUBCUTANEOUS at 17:41

## 2023-06-13 RX ADMIN — ROPINIROLE HYDROCHLORIDE 1 MG: 1 TABLET, FILM COATED ORAL at 21:55

## 2023-06-13 RX ADMIN — BUDESONIDE 250 MCG: 0.25 INHALANT RESPIRATORY (INHALATION) at 19:16

## 2023-06-13 RX ADMIN — SODIUM CHLORIDE, SODIUM LACTATE, POTASSIUM CHLORIDE, AND CALCIUM CHLORIDE 3171 ML: 600; 310; 30; 20 INJECTION, SOLUTION INTRAVENOUS at 15:00

## 2023-06-13 RX ADMIN — BUSPIRONE HYDROCHLORIDE 15 MG: 5 TABLET ORAL at 21:55

## 2023-06-13 ASSESSMENT — PAIN SCALES - GENERAL
PAINLEVEL_OUTOF10: 0

## 2023-06-13 ASSESSMENT — PAIN - FUNCTIONAL ASSESSMENT: PAIN_FUNCTIONAL_ASSESSMENT: 0-10

## 2023-06-14 ENCOUNTER — HOSPITAL ENCOUNTER (INPATIENT)
Facility: HOSPITAL | Age: 73
Discharge: HOME | DRG: 871 | End: 2023-06-17
Payer: MEDICARE

## 2023-06-14 ENCOUNTER — APPOINTMENT (OUTPATIENT)
Facility: HOSPITAL | Age: 73
DRG: 871 | End: 2023-06-14
Payer: MEDICARE

## 2023-06-14 PROBLEM — S42.302A FRACTURE OF LEFT HUMERUS: Status: RESOLVED | Noted: 2017-01-04 | Resolved: 2023-06-14

## 2023-06-14 LAB
ALBUMIN SERPL-MCNC: 2.9 G/DL (ref 3.4–5)
ALBUMIN/GLOB SERPL: 0.8 (ref 0.8–1.7)
ALP SERPL-CCNC: 64 U/L (ref 45–117)
ALT SERPL-CCNC: 30 U/L (ref 13–56)
ANION GAP SERPL CALC-SCNC: 5 MMOL/L (ref 3–18)
ARTERIAL PATENCY WRIST A: POSITIVE
AST SERPL-CCNC: 38 U/L (ref 10–38)
BACTERIA SPEC CULT: NORMAL
BASE DEFICIT BLD-SCNC: 0.2 MMOL/L
BASOPHILS # BLD: 0.1 K/UL (ref 0–0.1)
BASOPHILS NFR BLD: 0 % (ref 0–2)
BDY SITE: NORMAL
BILIRUB SERPL-MCNC: 0.4 MG/DL (ref 0.2–1)
BUN SERPL-MCNC: 16 MG/DL (ref 7–18)
BUN/CREAT SERPL: 20 (ref 12–20)
CALCIUM SERPL-MCNC: 8.9 MG/DL (ref 8.5–10.1)
CHLORIDE SERPL-SCNC: 112 MMOL/L (ref 100–111)
CO2 SERPL-SCNC: 26 MMOL/L (ref 21–32)
CREAT SERPL-MCNC: 0.82 MG/DL (ref 0.6–1.3)
DIFFERENTIAL METHOD BLD: ABNORMAL
EOSINOPHIL # BLD: 0 K/UL (ref 0–0.4)
EOSINOPHIL NFR BLD: 0 % (ref 0–5)
ERYTHROCYTE [DISTWIDTH] IN BLOOD BY AUTOMATED COUNT: 13.7 % (ref 11.6–14.5)
GAS FLOW.O2 O2 DELIVERY SYS: NORMAL
GAS FLOW.O2 SETTING OXYMISER: 25 BPM
GLOBULIN SER CALC-MCNC: 3.7 G/DL (ref 2–4)
GLUCOSE BLD STRIP.AUTO-MCNC: 111 MG/DL (ref 70–110)
GLUCOSE BLD STRIP.AUTO-MCNC: 124 MG/DL (ref 70–110)
GLUCOSE BLD STRIP.AUTO-MCNC: 132 MG/DL (ref 70–110)
GLUCOSE BLD STRIP.AUTO-MCNC: 154 MG/DL (ref 70–110)
GLUCOSE BLD STRIP.AUTO-MCNC: 164 MG/DL (ref 70–110)
GLUCOSE SERPL-MCNC: 154 MG/DL (ref 74–99)
HCO3 BLD-SCNC: 25.6 MMOL/L (ref 22–26)
HCT VFR BLD AUTO: 42.6 % (ref 35–45)
HGB BLD-MCNC: 13.5 G/DL (ref 12–16)
IMM GRANULOCYTES # BLD AUTO: 0.4 K/UL (ref 0–0.04)
IMM GRANULOCYTES NFR BLD AUTO: 1 % (ref 0–0.5)
LACTATE SERPL-SCNC: 2.2 MMOL/L (ref 0.4–2)
LYMPHOCYTES # BLD: 1.7 K/UL (ref 0.9–3.6)
LYMPHOCYTES NFR BLD: 7 % (ref 21–52)
MAGNESIUM SERPL-MCNC: 2.2 MG/DL (ref 1.6–2.6)
MCH RBC QN AUTO: 29.7 PG (ref 24–34)
MCHC RBC AUTO-ENTMCNC: 31.7 G/DL (ref 31–37)
MCV RBC AUTO: 93.8 FL (ref 78–100)
MONOCYTES # BLD: 1.8 K/UL (ref 0.05–1.2)
MONOCYTES NFR BLD: 7 % (ref 3–10)
NEUTS SEG # BLD: 20.8 K/UL (ref 1.8–8)
NEUTS SEG NFR BLD: 84 % (ref 40–73)
NRBC # BLD: 0 K/UL (ref 0–0.01)
NRBC BLD-RTO: 0 PER 100 WBC
NT PRO BNP: 2254 PG/ML (ref 0–900)
O2/TOTAL GAS SETTING VFR VENT: 50 %
PCO2 BLD: 45 MMHG (ref 35–45)
PH BLD: 7.36 (ref 7.35–7.45)
PLATELET # BLD AUTO: 217 K/UL (ref 135–420)
PMV BLD AUTO: 12.3 FL (ref 9.2–11.8)
PO2 BLD: 80 MMHG (ref 80–100)
POTASSIUM SERPL-SCNC: 3.8 MMOL/L (ref 3.5–5.5)
PROCALCITONIN SERPL-MCNC: 25.12 NG/ML
PROT SERPL-MCNC: 6.6 G/DL (ref 6.4–8.2)
RBC # BLD AUTO: 4.54 M/UL (ref 4.2–5.3)
SAO2 % BLD: 95.2 % (ref 92–97)
SERVICE CMNT-IMP: NORMAL
SERVICE CMNT-IMP: NORMAL
SODIUM SERPL-SCNC: 143 MMOL/L (ref 136–145)
SPECIMEN TYPE: NORMAL
TROPONIN I SERPL HS-MCNC: 21 NG/L (ref 0–54)
VENTILATION MODE VENT: NORMAL
WBC # BLD AUTO: 24.7 K/UL (ref 4.6–13.2)

## 2023-06-14 PROCEDURE — 2500000003 HC RX 250 WO HCPCS: Performed by: INTERNAL MEDICINE

## 2023-06-14 PROCEDURE — 2580000003 HC RX 258: Performed by: INTERNAL MEDICINE

## 2023-06-14 PROCEDURE — 82803 BLOOD GASES ANY COMBINATION: CPT

## 2023-06-14 PROCEDURE — 36600 WITHDRAWAL OF ARTERIAL BLOOD: CPT

## 2023-06-14 PROCEDURE — 6370000000 HC RX 637 (ALT 250 FOR IP): Performed by: INTERNAL MEDICINE

## 2023-06-14 PROCEDURE — 74018 RADEX ABDOMEN 1 VIEW: CPT

## 2023-06-14 PROCEDURE — C9113 INJ PANTOPRAZOLE SODIUM, VIA: HCPCS | Performed by: INTERNAL MEDICINE

## 2023-06-14 PROCEDURE — 84484 ASSAY OF TROPONIN QUANT: CPT

## 2023-06-14 PROCEDURE — 94660 CPAP INITIATION&MGMT: CPT

## 2023-06-14 PROCEDURE — 2000000000 HC ICU R&B

## 2023-06-14 PROCEDURE — 2700000000 HC OXYGEN THERAPY PER DAY

## 2023-06-14 PROCEDURE — 6360000002 HC RX W HCPCS: Performed by: HOSPITALIST

## 2023-06-14 PROCEDURE — 36415 COLL VENOUS BLD VENIPUNCTURE: CPT

## 2023-06-14 PROCEDURE — 94640 AIRWAY INHALATION TREATMENT: CPT

## 2023-06-14 PROCEDURE — 85025 COMPLETE CBC W/AUTO DIFF WBC: CPT

## 2023-06-14 PROCEDURE — 99223 1ST HOSP IP/OBS HIGH 75: CPT | Performed by: INTERNAL MEDICINE

## 2023-06-14 PROCEDURE — 84145 PROCALCITONIN (PCT): CPT

## 2023-06-14 PROCEDURE — 82962 GLUCOSE BLOOD TEST: CPT

## 2023-06-14 PROCEDURE — 83605 ASSAY OF LACTIC ACID: CPT

## 2023-06-14 PROCEDURE — 6360000002 HC RX W HCPCS: Performed by: INTERNAL MEDICINE

## 2023-06-14 PROCEDURE — P9047 ALBUMIN (HUMAN), 25%, 50ML: HCPCS | Performed by: INTERNAL MEDICINE

## 2023-06-14 PROCEDURE — 83880 ASSAY OF NATRIURETIC PEPTIDE: CPT

## 2023-06-14 PROCEDURE — 71045 X-RAY EXAM CHEST 1 VIEW: CPT

## 2023-06-14 PROCEDURE — 6370000000 HC RX 637 (ALT 250 FOR IP): Performed by: FAMILY MEDICINE

## 2023-06-14 PROCEDURE — 83735 ASSAY OF MAGNESIUM: CPT

## 2023-06-14 PROCEDURE — 80053 COMPREHEN METABOLIC PANEL: CPT

## 2023-06-14 PROCEDURE — 2580000003 HC RX 258: Performed by: HOSPITALIST

## 2023-06-14 PROCEDURE — A4216 STERILE WATER/SALINE, 10 ML: HCPCS | Performed by: INTERNAL MEDICINE

## 2023-06-14 RX ORDER — ENOXAPARIN SODIUM 150 MG/ML
1 INJECTION SUBCUTANEOUS 2 TIMES DAILY
Status: DISCONTINUED | OUTPATIENT
Start: 2023-06-14 | End: 2023-06-21 | Stop reason: HOSPADM

## 2023-06-14 RX ORDER — PANTOPRAZOLE SODIUM 40 MG/1
40 TABLET, DELAYED RELEASE ORAL
Status: DISCONTINUED | OUTPATIENT
Start: 2023-06-15 | End: 2023-06-14 | Stop reason: ALTCHOICE

## 2023-06-14 RX ADMIN — DOXYCYCLINE 100 MG: 100 INJECTION, POWDER, LYOPHILIZED, FOR SOLUTION INTRAVENOUS at 17:00

## 2023-06-14 RX ADMIN — ENOXAPARIN SODIUM 150 MG: 150 INJECTION SUBCUTANEOUS at 23:55

## 2023-06-14 RX ADMIN — DOXYCYCLINE 100 MG: 100 INJECTION, POWDER, LYOPHILIZED, FOR SOLUTION INTRAVENOUS at 03:41

## 2023-06-14 RX ADMIN — GABAPENTIN 600 MG: 300 CAPSULE ORAL at 20:58

## 2023-06-14 RX ADMIN — MEROPENEM 1000 MG: 1 INJECTION, POWDER, FOR SOLUTION INTRAVENOUS at 17:00

## 2023-06-14 RX ADMIN — ROPINIROLE HYDROCHLORIDE 1 MG: 1 TABLET, FILM COATED ORAL at 12:27

## 2023-06-14 RX ADMIN — GABAPENTIN 600 MG: 300 CAPSULE ORAL at 09:25

## 2023-06-14 RX ADMIN — VANCOMYCIN HYDROCHLORIDE 1000 MG: 1 INJECTION, POWDER, LYOPHILIZED, FOR SOLUTION INTRAVENOUS at 23:56

## 2023-06-14 RX ADMIN — MEROPENEM 1000 MG: 1 INJECTION, POWDER, FOR SOLUTION INTRAVENOUS at 23:55

## 2023-06-14 RX ADMIN — ROPINIROLE HYDROCHLORIDE 1 MG: 1 TABLET, FILM COATED ORAL at 14:30

## 2023-06-14 RX ADMIN — IPRATROPIUM BROMIDE AND ALBUTEROL SULFATE 1 DOSE: 2.5; .5 SOLUTION RESPIRATORY (INHALATION) at 07:20

## 2023-06-14 RX ADMIN — MEROPENEM 1000 MG: 1 INJECTION, POWDER, FOR SOLUTION INTRAVENOUS at 09:16

## 2023-06-14 RX ADMIN — ALBUMIN (HUMAN) 12.5 G: 0.25 INJECTION, SOLUTION INTRAVENOUS at 17:09

## 2023-06-14 RX ADMIN — SODIUM CHLORIDE 40 MG: 9 INJECTION INTRAMUSCULAR; INTRAVENOUS; SUBCUTANEOUS at 09:00

## 2023-06-14 RX ADMIN — ALBUMIN (HUMAN) 12.5 G: 0.25 INJECTION, SOLUTION INTRAVENOUS at 21:30

## 2023-06-14 RX ADMIN — ENOXAPARIN SODIUM 150 MG: 150 INJECTION SUBCUTANEOUS at 12:26

## 2023-06-14 RX ADMIN — ALBUMIN (HUMAN) 12.5 G: 0.25 INJECTION, SOLUTION INTRAVENOUS at 03:41

## 2023-06-14 RX ADMIN — ROPINIROLE HYDROCHLORIDE 1 MG: 1 TABLET, FILM COATED ORAL at 20:59

## 2023-06-14 RX ADMIN — IPRATROPIUM BROMIDE AND ALBUTEROL SULFATE 1 DOSE: 2.5; .5 SOLUTION RESPIRATORY (INHALATION) at 15:10

## 2023-06-14 RX ADMIN — VANCOMYCIN HYDROCHLORIDE 1000 MG: 1 INJECTION, POWDER, LYOPHILIZED, FOR SOLUTION INTRAVENOUS at 12:27

## 2023-06-14 RX ADMIN — ARFORMOTEROL TARTRATE 15 MCG: 15 SOLUTION RESPIRATORY (INHALATION) at 07:20

## 2023-06-14 RX ADMIN — METHYLPREDNISOLONE SODIUM SUCCINATE 20 MG: 40 INJECTION INTRAMUSCULAR; INTRAVENOUS at 21:02

## 2023-06-14 RX ADMIN — Medication 5 MCG/MIN: at 08:50

## 2023-06-14 RX ADMIN — ARFORMOTEROL TARTRATE 15 MCG: 15 SOLUTION RESPIRATORY (INHALATION) at 20:14

## 2023-06-14 RX ADMIN — SERTRALINE 100 MG: 50 TABLET, FILM COATED ORAL at 09:26

## 2023-06-14 RX ADMIN — Medication 10 MG: at 20:58

## 2023-06-14 RX ADMIN — BUSPIRONE HYDROCHLORIDE 15 MG: 5 TABLET ORAL at 09:25

## 2023-06-14 RX ADMIN — BUSPIRONE HYDROCHLORIDE 15 MG: 5 TABLET ORAL at 20:58

## 2023-06-14 RX ADMIN — ALBUMIN (HUMAN) 12.5 G: 0.25 INJECTION, SOLUTION INTRAVENOUS at 09:17

## 2023-06-14 RX ADMIN — WATER 40 MG: 1 INJECTION INTRAMUSCULAR; INTRAVENOUS; SUBCUTANEOUS at 09:23

## 2023-06-14 ASSESSMENT — PAIN SCALES - GENERAL
PAINLEVEL_OUTOF10: 0

## 2023-06-15 ENCOUNTER — APPOINTMENT (OUTPATIENT)
Facility: HOSPITAL | Age: 73
DRG: 871 | End: 2023-06-15
Payer: MEDICARE

## 2023-06-15 LAB
ALBUMIN SERPL-MCNC: 3 G/DL (ref 3.4–5)
ALBUMIN/GLOB SERPL: 0.8 (ref 0.8–1.7)
ALP SERPL-CCNC: 64 U/L (ref 45–117)
ALT SERPL-CCNC: 28 U/L (ref 13–56)
ANION GAP SERPL CALC-SCNC: 4 MMOL/L (ref 3–18)
AST SERPL-CCNC: 32 U/L (ref 10–38)
BASOPHILS # BLD: 0.1 K/UL (ref 0–0.1)
BASOPHILS NFR BLD: 0 % (ref 0–2)
BILIRUB SERPL-MCNC: 0.4 MG/DL (ref 0.2–1)
BUN SERPL-MCNC: 19 MG/DL (ref 7–18)
BUN/CREAT SERPL: 28 (ref 12–20)
CALCIUM SERPL-MCNC: 8.5 MG/DL (ref 8.5–10.1)
CHLORIDE SERPL-SCNC: 111 MMOL/L (ref 100–111)
CO2 SERPL-SCNC: 27 MMOL/L (ref 21–32)
CREAT SERPL-MCNC: 0.69 MG/DL (ref 0.6–1.3)
DIFFERENTIAL METHOD BLD: ABNORMAL
EOSINOPHIL # BLD: 0 K/UL (ref 0–0.4)
EOSINOPHIL NFR BLD: 0 % (ref 0–5)
ERYTHROCYTE [DISTWIDTH] IN BLOOD BY AUTOMATED COUNT: 13.8 % (ref 11.6–14.5)
GLOBULIN SER CALC-MCNC: 3.7 G/DL (ref 2–4)
GLUCOSE BLD STRIP.AUTO-MCNC: 113 MG/DL (ref 70–110)
GLUCOSE BLD STRIP.AUTO-MCNC: 117 MG/DL (ref 70–110)
GLUCOSE BLD STRIP.AUTO-MCNC: 120 MG/DL (ref 70–110)
GLUCOSE BLD STRIP.AUTO-MCNC: 122 MG/DL (ref 70–110)
GLUCOSE BLD STRIP.AUTO-MCNC: 123 MG/DL (ref 70–110)
GLUCOSE BLD STRIP.AUTO-MCNC: 126 MG/DL (ref 70–110)
GLUCOSE SERPL-MCNC: 131 MG/DL (ref 74–99)
HCT VFR BLD AUTO: 39 % (ref 35–45)
HGB BLD-MCNC: 12.1 G/DL (ref 12–16)
IMM GRANULOCYTES # BLD AUTO: 0.4 K/UL (ref 0–0.04)
IMM GRANULOCYTES NFR BLD AUTO: 2 % (ref 0–0.5)
LYMPHOCYTES # BLD: 1.4 K/UL (ref 0.9–3.6)
LYMPHOCYTES NFR BLD: 8 % (ref 21–52)
MAGNESIUM SERPL-MCNC: 2.3 MG/DL (ref 1.6–2.6)
MCH RBC QN AUTO: 29.4 PG (ref 24–34)
MCHC RBC AUTO-ENTMCNC: 31 G/DL (ref 31–37)
MCV RBC AUTO: 94.7 FL (ref 78–100)
MONOCYTES # BLD: 0.9 K/UL (ref 0.05–1.2)
MONOCYTES NFR BLD: 6 % (ref 3–10)
NEUTS SEG # BLD: 14 K/UL (ref 1.8–8)
NEUTS SEG NFR BLD: 84 % (ref 40–73)
NRBC # BLD: 0 K/UL (ref 0–0.01)
NRBC BLD-RTO: 0 PER 100 WBC
PLATELET # BLD AUTO: 167 K/UL (ref 135–420)
PMV BLD AUTO: 12.3 FL (ref 9.2–11.8)
POTASSIUM SERPL-SCNC: 4.3 MMOL/L (ref 3.5–5.5)
PROT SERPL-MCNC: 6.7 G/DL (ref 6.4–8.2)
RBC # BLD AUTO: 4.12 M/UL (ref 4.2–5.3)
SODIUM SERPL-SCNC: 142 MMOL/L (ref 136–145)
WBC # BLD AUTO: 16.7 K/UL (ref 4.6–13.2)

## 2023-06-15 PROCEDURE — 6370000000 HC RX 637 (ALT 250 FOR IP): Performed by: HOSPITALIST

## 2023-06-15 PROCEDURE — 6360000002 HC RX W HCPCS: Performed by: INTERNAL MEDICINE

## 2023-06-15 PROCEDURE — 97162 PT EVAL MOD COMPLEX 30 MIN: CPT

## 2023-06-15 PROCEDURE — 1100000003 HC PRIVATE W/ TELEMETRY

## 2023-06-15 PROCEDURE — 6360000002 HC RX W HCPCS: Performed by: HOSPITALIST

## 2023-06-15 PROCEDURE — 2580000003 HC RX 258: Performed by: INTERNAL MEDICINE

## 2023-06-15 PROCEDURE — P9047 ALBUMIN (HUMAN), 25%, 50ML: HCPCS | Performed by: INTERNAL MEDICINE

## 2023-06-15 PROCEDURE — 2580000003 HC RX 258: Performed by: HOSPITALIST

## 2023-06-15 PROCEDURE — 6370000000 HC RX 637 (ALT 250 FOR IP): Performed by: INTERNAL MEDICINE

## 2023-06-15 PROCEDURE — 83735 ASSAY OF MAGNESIUM: CPT

## 2023-06-15 PROCEDURE — 80053 COMPREHEN METABOLIC PANEL: CPT

## 2023-06-15 PROCEDURE — 6370000000 HC RX 637 (ALT 250 FOR IP): Performed by: FAMILY MEDICINE

## 2023-06-15 PROCEDURE — 97530 THERAPEUTIC ACTIVITIES: CPT

## 2023-06-15 PROCEDURE — 2500000003 HC RX 250 WO HCPCS: Performed by: INTERNAL MEDICINE

## 2023-06-15 PROCEDURE — 2700000000 HC OXYGEN THERAPY PER DAY

## 2023-06-15 PROCEDURE — 85025 COMPLETE CBC W/AUTO DIFF WBC: CPT

## 2023-06-15 PROCEDURE — 94640 AIRWAY INHALATION TREATMENT: CPT

## 2023-06-15 PROCEDURE — A4216 STERILE WATER/SALINE, 10 ML: HCPCS | Performed by: INTERNAL MEDICINE

## 2023-06-15 PROCEDURE — 82962 GLUCOSE BLOOD TEST: CPT

## 2023-06-15 PROCEDURE — 76700 US EXAM ABDOM COMPLETE: CPT

## 2023-06-15 PROCEDURE — C9113 INJ PANTOPRAZOLE SODIUM, VIA: HCPCS | Performed by: INTERNAL MEDICINE

## 2023-06-15 RX ORDER — PREDNISONE 10 MG/1
10 TABLET ORAL DAILY
Status: DISCONTINUED | OUTPATIENT
Start: 2023-06-15 | End: 2023-06-19

## 2023-06-15 RX ORDER — PANTOPRAZOLE SODIUM 40 MG/1
40 TABLET, DELAYED RELEASE ORAL
Status: DISCONTINUED | OUTPATIENT
Start: 2023-06-16 | End: 2023-06-21 | Stop reason: HOSPADM

## 2023-06-15 RX ADMIN — MEROPENEM 1000 MG: 1 INJECTION, POWDER, FOR SOLUTION INTRAVENOUS at 17:01

## 2023-06-15 RX ADMIN — ENOXAPARIN SODIUM 150 MG: 150 INJECTION SUBCUTANEOUS at 11:00

## 2023-06-15 RX ADMIN — BUSPIRONE HYDROCHLORIDE 15 MG: 5 TABLET ORAL at 08:15

## 2023-06-15 RX ADMIN — MEROPENEM 1000 MG: 1 INJECTION, POWDER, FOR SOLUTION INTRAVENOUS at 08:10

## 2023-06-15 RX ADMIN — BUDESONIDE 250 MCG: 0.25 INHALANT RESPIRATORY (INHALATION) at 07:25

## 2023-06-15 RX ADMIN — METHYLPREDNISOLONE SODIUM SUCCINATE 20 MG: 40 INJECTION INTRAMUSCULAR; INTRAVENOUS at 08:16

## 2023-06-15 RX ADMIN — GABAPENTIN 600 MG: 300 CAPSULE ORAL at 08:15

## 2023-06-15 RX ADMIN — IPRATROPIUM BROMIDE AND ALBUTEROL SULFATE 1 DOSE: 2.5; .5 SOLUTION RESPIRATORY (INHALATION) at 19:35

## 2023-06-15 RX ADMIN — DOXYCYCLINE 100 MG: 100 INJECTION, POWDER, LYOPHILIZED, FOR SOLUTION INTRAVENOUS at 04:00

## 2023-06-15 RX ADMIN — PREDNISONE 10 MG: 10 TABLET ORAL at 12:14

## 2023-06-15 RX ADMIN — ROPINIROLE HYDROCHLORIDE 1 MG: 1 TABLET, FILM COATED ORAL at 08:15

## 2023-06-15 RX ADMIN — SERTRALINE 100 MG: 50 TABLET, FILM COATED ORAL at 08:15

## 2023-06-15 RX ADMIN — ROPINIROLE HYDROCHLORIDE 1 MG: 1 TABLET, FILM COATED ORAL at 20:53

## 2023-06-15 RX ADMIN — ARFORMOTEROL TARTRATE 15 MCG: 15 SOLUTION RESPIRATORY (INHALATION) at 07:26

## 2023-06-15 RX ADMIN — ALBUMIN (HUMAN) 12.5 G: 0.25 INJECTION, SOLUTION INTRAVENOUS at 11:00

## 2023-06-15 RX ADMIN — IPRATROPIUM BROMIDE AND ALBUTEROL SULFATE 1 DOSE: 2.5; .5 SOLUTION RESPIRATORY (INHALATION) at 07:26

## 2023-06-15 RX ADMIN — GABAPENTIN 600 MG: 300 CAPSULE ORAL at 20:53

## 2023-06-15 RX ADMIN — BUDESONIDE 250 MCG: 0.25 INHALANT RESPIRATORY (INHALATION) at 19:35

## 2023-06-15 RX ADMIN — ALBUMIN (HUMAN) 12.5 G: 0.25 INJECTION, SOLUTION INTRAVENOUS at 16:42

## 2023-06-15 RX ADMIN — ACETAMINOPHEN 650 MG: 325 TABLET ORAL at 21:00

## 2023-06-15 RX ADMIN — Medication 10 MG: at 20:53

## 2023-06-15 RX ADMIN — PANTOPRAZOLE SODIUM 40 MG: 40 INJECTION, POWDER, FOR SOLUTION INTRAVENOUS at 08:17

## 2023-06-15 RX ADMIN — IPRATROPIUM BROMIDE AND ALBUTEROL SULFATE 1 DOSE: 2.5; .5 SOLUTION RESPIRATORY (INHALATION) at 14:02

## 2023-06-15 RX ADMIN — BUSPIRONE HYDROCHLORIDE 15 MG: 5 TABLET ORAL at 20:54

## 2023-06-15 RX ADMIN — ARFORMOTEROL TARTRATE 15 MCG: 15 SOLUTION RESPIRATORY (INHALATION) at 19:35

## 2023-06-15 RX ADMIN — DOXYCYCLINE 100 MG: 100 INJECTION, POWDER, LYOPHILIZED, FOR SOLUTION INTRAVENOUS at 16:53

## 2023-06-15 RX ADMIN — ROPINIROLE HYDROCHLORIDE 1 MG: 1 TABLET, FILM COATED ORAL at 14:43

## 2023-06-15 RX ADMIN — ACETAMINOPHEN 650 MG: 325 TABLET ORAL at 03:47

## 2023-06-15 RX ADMIN — ALBUMIN (HUMAN) 12.5 G: 0.25 INJECTION, SOLUTION INTRAVENOUS at 03:54

## 2023-06-15 RX ADMIN — ALBUMIN (HUMAN) 12.5 G: 0.25 INJECTION, SOLUTION INTRAVENOUS at 22:25

## 2023-06-15 RX ADMIN — VANCOMYCIN HYDROCHLORIDE 1000 MG: 1 INJECTION, POWDER, LYOPHILIZED, FOR SOLUTION INTRAVENOUS at 11:00

## 2023-06-16 ENCOUNTER — APPOINTMENT (OUTPATIENT)
Facility: HOSPITAL | Age: 73
DRG: 871 | End: 2023-06-16
Payer: MEDICARE

## 2023-06-16 LAB
ALBUMIN SERPL-MCNC: 3.4 G/DL (ref 3.4–5)
ALBUMIN/GLOB SERPL: 1.1 (ref 0.8–1.7)
ALP SERPL-CCNC: 75 U/L (ref 45–117)
ALT SERPL-CCNC: 35 U/L (ref 13–56)
ANION GAP SERPL CALC-SCNC: 3 MMOL/L (ref 3–18)
AST SERPL-CCNC: 34 U/L (ref 10–38)
BASOPHILS # BLD: 0 K/UL (ref 0–0.1)
BASOPHILS NFR BLD: 0 % (ref 0–2)
BILIRUB SERPL-MCNC: 0.5 MG/DL (ref 0.2–1)
BUN SERPL-MCNC: 22 MG/DL (ref 7–18)
BUN/CREAT SERPL: 35 (ref 12–20)
CALCIUM SERPL-MCNC: 8.8 MG/DL (ref 8.5–10.1)
CHLORIDE SERPL-SCNC: 111 MMOL/L (ref 100–111)
CO2 SERPL-SCNC: 29 MMOL/L (ref 21–32)
CREAT SERPL-MCNC: 0.63 MG/DL (ref 0.6–1.3)
DIFFERENTIAL METHOD BLD: ABNORMAL
EOSINOPHIL # BLD: 0 K/UL (ref 0–0.4)
EOSINOPHIL NFR BLD: 0 % (ref 0–5)
ERYTHROCYTE [DISTWIDTH] IN BLOOD BY AUTOMATED COUNT: 14 % (ref 11.6–14.5)
GLOBULIN SER CALC-MCNC: 3.1 G/DL (ref 2–4)
GLUCOSE BLD STRIP.AUTO-MCNC: 106 MG/DL (ref 70–110)
GLUCOSE BLD STRIP.AUTO-MCNC: 117 MG/DL (ref 70–110)
GLUCOSE BLD STRIP.AUTO-MCNC: 84 MG/DL (ref 70–110)
GLUCOSE BLD STRIP.AUTO-MCNC: 85 MG/DL (ref 70–110)
GLUCOSE BLD STRIP.AUTO-MCNC: 90 MG/DL (ref 70–110)
GLUCOSE SERPL-MCNC: 121 MG/DL (ref 74–99)
HCT VFR BLD AUTO: 41.9 % (ref 35–45)
HGB BLD-MCNC: 12.5 G/DL (ref 12–16)
IMM GRANULOCYTES # BLD AUTO: 0.2 K/UL (ref 0–0.04)
IMM GRANULOCYTES NFR BLD AUTO: 1 % (ref 0–0.5)
LYMPHOCYTES # BLD: 1.8 K/UL (ref 0.9–3.6)
LYMPHOCYTES NFR BLD: 12 % (ref 21–52)
MAGNESIUM SERPL-MCNC: 2.4 MG/DL (ref 1.6–2.6)
MCH RBC QN AUTO: 29.1 PG (ref 24–34)
MCHC RBC AUTO-ENTMCNC: 29.8 G/DL (ref 31–37)
MCV RBC AUTO: 97.7 FL (ref 78–100)
MONOCYTES # BLD: 0.7 K/UL (ref 0.05–1.2)
MONOCYTES NFR BLD: 5 % (ref 3–10)
NEUTS SEG # BLD: 12.2 K/UL (ref 1.8–8)
NEUTS SEG NFR BLD: 82 % (ref 40–73)
NRBC # BLD: 0 K/UL (ref 0–0.01)
NRBC BLD-RTO: 0 PER 100 WBC
PLATELET # BLD AUTO: 170 K/UL (ref 135–420)
PMV BLD AUTO: 13 FL (ref 9.2–11.8)
POTASSIUM SERPL-SCNC: 4.4 MMOL/L (ref 3.5–5.5)
PROT SERPL-MCNC: 6.5 G/DL (ref 6.4–8.2)
RBC # BLD AUTO: 4.29 M/UL (ref 4.2–5.3)
SODIUM SERPL-SCNC: 143 MMOL/L (ref 136–145)
VANCOMYCIN SERPL-MCNC: 23.3 UG/ML (ref 5–40)
WBC # BLD AUTO: 14.8 K/UL (ref 4.6–13.2)

## 2023-06-16 PROCEDURE — 1100000003 HC PRIVATE W/ TELEMETRY

## 2023-06-16 PROCEDURE — P9047 ALBUMIN (HUMAN), 25%, 50ML: HCPCS | Performed by: INTERNAL MEDICINE

## 2023-06-16 PROCEDURE — 87186 SC STD MICRODIL/AGAR DIL: CPT

## 2023-06-16 PROCEDURE — 2500000003 HC RX 250 WO HCPCS: Performed by: INTERNAL MEDICINE

## 2023-06-16 PROCEDURE — 6360000002 HC RX W HCPCS: Performed by: INTERNAL MEDICINE

## 2023-06-16 PROCEDURE — 82962 GLUCOSE BLOOD TEST: CPT

## 2023-06-16 PROCEDURE — 6370000000 HC RX 637 (ALT 250 FOR IP): Performed by: INTERNAL MEDICINE

## 2023-06-16 PROCEDURE — 6360000002 HC RX W HCPCS: Performed by: HOSPITALIST

## 2023-06-16 PROCEDURE — 87205 SMEAR GRAM STAIN: CPT

## 2023-06-16 PROCEDURE — 83735 ASSAY OF MAGNESIUM: CPT

## 2023-06-16 PROCEDURE — 2700000000 HC OXYGEN THERAPY PER DAY

## 2023-06-16 PROCEDURE — 6370000000 HC RX 637 (ALT 250 FOR IP): Performed by: HOSPITALIST

## 2023-06-16 PROCEDURE — 80053 COMPREHEN METABOLIC PANEL: CPT

## 2023-06-16 PROCEDURE — 2580000003 HC RX 258: Performed by: HOSPITALIST

## 2023-06-16 PROCEDURE — 71045 X-RAY EXAM CHEST 1 VIEW: CPT

## 2023-06-16 PROCEDURE — 97165 OT EVAL LOW COMPLEX 30 MIN: CPT

## 2023-06-16 PROCEDURE — 87070 CULTURE OTHR SPECIMN AEROBIC: CPT

## 2023-06-16 PROCEDURE — 80202 ASSAY OF VANCOMYCIN: CPT

## 2023-06-16 PROCEDURE — 85025 COMPLETE CBC W/AUTO DIFF WBC: CPT

## 2023-06-16 PROCEDURE — 97530 THERAPEUTIC ACTIVITIES: CPT

## 2023-06-16 PROCEDURE — 97110 THERAPEUTIC EXERCISES: CPT

## 2023-06-16 PROCEDURE — 6370000000 HC RX 637 (ALT 250 FOR IP): Performed by: FAMILY MEDICINE

## 2023-06-16 PROCEDURE — 94640 AIRWAY INHALATION TREATMENT: CPT

## 2023-06-16 PROCEDURE — 2580000003 HC RX 258: Performed by: INTERNAL MEDICINE

## 2023-06-16 PROCEDURE — 87077 CULTURE AEROBIC IDENTIFY: CPT

## 2023-06-16 PROCEDURE — 36415 COLL VENOUS BLD VENIPUNCTURE: CPT

## 2023-06-16 RX ORDER — FUROSEMIDE 10 MG/ML
20 INJECTION INTRAMUSCULAR; INTRAVENOUS 2 TIMES DAILY
Status: COMPLETED | OUTPATIENT
Start: 2023-06-16 | End: 2023-06-18

## 2023-06-16 RX ORDER — MONTELUKAST SODIUM 10 MG/1
10 TABLET ORAL NIGHTLY
COMMUNITY

## 2023-06-16 RX ORDER — CETIRIZINE HYDROCHLORIDE 10 MG/1
10 TABLET ORAL DAILY
Status: DISCONTINUED | OUTPATIENT
Start: 2023-06-16 | End: 2023-06-21 | Stop reason: HOSPADM

## 2023-06-16 RX ORDER — PRAVASTATIN SODIUM 20 MG
40 TABLET ORAL NIGHTLY
Status: DISCONTINUED | OUTPATIENT
Start: 2023-06-16 | End: 2023-06-21 | Stop reason: HOSPADM

## 2023-06-16 RX ORDER — FUROSEMIDE 20 MG/1
20 TABLET ORAL DAILY
Status: ON HOLD | COMMUNITY
Start: 2023-03-17 | End: 2023-06-21 | Stop reason: HOSPADM

## 2023-06-16 RX ADMIN — ENOXAPARIN SODIUM 150 MG: 150 INJECTION SUBCUTANEOUS at 02:01

## 2023-06-16 RX ADMIN — IPRATROPIUM BROMIDE AND ALBUTEROL SULFATE 1 DOSE: 2.5; .5 SOLUTION RESPIRATORY (INHALATION) at 20:09

## 2023-06-16 RX ADMIN — FUROSEMIDE 20 MG: 10 INJECTION, SOLUTION INTRAMUSCULAR; INTRAVENOUS at 14:42

## 2023-06-16 RX ADMIN — MEROPENEM 1000 MG: 1 INJECTION, POWDER, FOR SOLUTION INTRAVENOUS at 02:00

## 2023-06-16 RX ADMIN — DOXYCYCLINE 100 MG: 100 INJECTION, POWDER, LYOPHILIZED, FOR SOLUTION INTRAVENOUS at 04:35

## 2023-06-16 RX ADMIN — SODIUM CHLORIDE, PRESERVATIVE FREE 10 ML: 5 INJECTION INTRAVENOUS at 21:58

## 2023-06-16 RX ADMIN — ROPINIROLE HYDROCHLORIDE 1 MG: 1 TABLET, FILM COATED ORAL at 13:56

## 2023-06-16 RX ADMIN — PRAVASTATIN SODIUM 40 MG: 20 TABLET ORAL at 21:55

## 2023-06-16 RX ADMIN — GABAPENTIN 600 MG: 300 CAPSULE ORAL at 09:14

## 2023-06-16 RX ADMIN — ENOXAPARIN SODIUM 150 MG: 150 INJECTION SUBCUTANEOUS at 12:22

## 2023-06-16 RX ADMIN — MEROPENEM 1000 MG: 1 INJECTION, POWDER, FOR SOLUTION INTRAVENOUS at 09:36

## 2023-06-16 RX ADMIN — DOXYCYCLINE 100 MG: 100 INJECTION, POWDER, LYOPHILIZED, FOR SOLUTION INTRAVENOUS at 16:13

## 2023-06-16 RX ADMIN — ROPINIROLE HYDROCHLORIDE 1 MG: 1 TABLET, FILM COATED ORAL at 09:15

## 2023-06-16 RX ADMIN — CETIRIZINE HYDROCHLORIDE 10 MG: 10 TABLET, FILM COATED ORAL at 14:42

## 2023-06-16 RX ADMIN — BUDESONIDE 250 MCG: 0.25 INHALANT RESPIRATORY (INHALATION) at 06:57

## 2023-06-16 RX ADMIN — VANCOMYCIN HYDROCHLORIDE 1000 MG: 1 INJECTION, POWDER, LYOPHILIZED, FOR SOLUTION INTRAVENOUS at 02:01

## 2023-06-16 RX ADMIN — VANCOMYCIN HYDROCHLORIDE 1000 MG: 1 INJECTION, POWDER, LYOPHILIZED, FOR SOLUTION INTRAVENOUS at 12:22

## 2023-06-16 RX ADMIN — PREDNISONE 10 MG: 10 TABLET ORAL at 09:15

## 2023-06-16 RX ADMIN — MEROPENEM 1000 MG: 1 INJECTION, POWDER, FOR SOLUTION INTRAVENOUS at 17:33

## 2023-06-16 RX ADMIN — SERTRALINE 100 MG: 50 TABLET, FILM COATED ORAL at 09:15

## 2023-06-16 RX ADMIN — FUROSEMIDE 20 MG: 10 INJECTION, SOLUTION INTRAMUSCULAR; INTRAVENOUS at 17:33

## 2023-06-16 RX ADMIN — ARFORMOTEROL TARTRATE 15 MCG: 15 SOLUTION RESPIRATORY (INHALATION) at 06:57

## 2023-06-16 RX ADMIN — ALBUMIN (HUMAN) 12.5 G: 0.25 INJECTION, SOLUTION INTRAVENOUS at 04:35

## 2023-06-16 RX ADMIN — PANTOPRAZOLE SODIUM 40 MG: 40 TABLET, DELAYED RELEASE ORAL at 06:12

## 2023-06-16 RX ADMIN — ROPINIROLE HYDROCHLORIDE 1 MG: 1 TABLET, FILM COATED ORAL at 21:55

## 2023-06-16 RX ADMIN — SODIUM CHLORIDE, PRESERVATIVE FREE 10 ML: 5 INJECTION INTRAVENOUS at 09:43

## 2023-06-16 RX ADMIN — ACETAMINOPHEN 650 MG: 325 TABLET ORAL at 21:09

## 2023-06-16 RX ADMIN — Medication 10 MG: at 21:54

## 2023-06-16 RX ADMIN — BUSPIRONE HYDROCHLORIDE 15 MG: 5 TABLET ORAL at 09:15

## 2023-06-16 RX ADMIN — IPRATROPIUM BROMIDE AND ALBUTEROL SULFATE 1 DOSE: 2.5; .5 SOLUTION RESPIRATORY (INHALATION) at 06:57

## 2023-06-16 RX ADMIN — ARFORMOTEROL TARTRATE 15 MCG: 15 SOLUTION RESPIRATORY (INHALATION) at 20:09

## 2023-06-16 RX ADMIN — BUDESONIDE 250 MCG: 0.25 INHALANT RESPIRATORY (INHALATION) at 20:08

## 2023-06-16 RX ADMIN — IPRATROPIUM BROMIDE AND ALBUTEROL SULFATE 1 DOSE: 2.5; .5 SOLUTION RESPIRATORY (INHALATION) at 14:45

## 2023-06-16 RX ADMIN — ALBUMIN (HUMAN) 12.5 G: 0.25 INJECTION, SOLUTION INTRAVENOUS at 09:17

## 2023-06-16 RX ADMIN — GABAPENTIN 600 MG: 300 CAPSULE ORAL at 21:54

## 2023-06-16 RX ADMIN — BUSPIRONE HYDROCHLORIDE 15 MG: 5 TABLET ORAL at 21:55

## 2023-06-16 ASSESSMENT — PAIN DESCRIPTION - LOCATION
LOCATION: HEAD
LOCATION: HEAD

## 2023-06-16 ASSESSMENT — PAIN SCALES - GENERAL
PAINLEVEL_OUTOF10: 5
PAINLEVEL_OUTOF10: 5
PAINLEVEL_OUTOF10: 1

## 2023-06-17 LAB
GLUCOSE BLD STRIP.AUTO-MCNC: 100 MG/DL (ref 70–110)
GLUCOSE BLD STRIP.AUTO-MCNC: 110 MG/DL (ref 70–110)
GLUCOSE BLD STRIP.AUTO-MCNC: 148 MG/DL (ref 70–110)
GLUCOSE BLD STRIP.AUTO-MCNC: 78 MG/DL (ref 70–110)
GLUCOSE BLD STRIP.AUTO-MCNC: 86 MG/DL (ref 70–110)
GLUCOSE BLD STRIP.AUTO-MCNC: 90 MG/DL (ref 70–110)

## 2023-06-17 PROCEDURE — 6360000002 HC RX W HCPCS: Performed by: INTERNAL MEDICINE

## 2023-06-17 PROCEDURE — 94640 AIRWAY INHALATION TREATMENT: CPT

## 2023-06-17 PROCEDURE — 2580000003 HC RX 258: Performed by: INTERNAL MEDICINE

## 2023-06-17 PROCEDURE — 6360000002 HC RX W HCPCS: Performed by: HOSPITALIST

## 2023-06-17 PROCEDURE — 6370000000 HC RX 637 (ALT 250 FOR IP): Performed by: INTERNAL MEDICINE

## 2023-06-17 PROCEDURE — 93005 ELECTROCARDIOGRAM TRACING: CPT | Performed by: INTERNAL MEDICINE

## 2023-06-17 PROCEDURE — 82962 GLUCOSE BLOOD TEST: CPT

## 2023-06-17 PROCEDURE — 2700000000 HC OXYGEN THERAPY PER DAY

## 2023-06-17 PROCEDURE — 6370000000 HC RX 637 (ALT 250 FOR IP): Performed by: HOSPITALIST

## 2023-06-17 PROCEDURE — 97530 THERAPEUTIC ACTIVITIES: CPT

## 2023-06-17 PROCEDURE — 1100000003 HC PRIVATE W/ TELEMETRY

## 2023-06-17 PROCEDURE — 2580000003 HC RX 258: Performed by: HOSPITALIST

## 2023-06-17 PROCEDURE — 6370000000 HC RX 637 (ALT 250 FOR IP): Performed by: FAMILY MEDICINE

## 2023-06-17 PROCEDURE — 2500000003 HC RX 250 WO HCPCS: Performed by: INTERNAL MEDICINE

## 2023-06-17 RX ADMIN — IPRATROPIUM BROMIDE AND ALBUTEROL SULFATE 1 DOSE: 2.5; .5 SOLUTION RESPIRATORY (INHALATION) at 07:38

## 2023-06-17 RX ADMIN — ENOXAPARIN SODIUM 150 MG: 150 INJECTION SUBCUTANEOUS at 12:22

## 2023-06-17 RX ADMIN — VANCOMYCIN HYDROCHLORIDE 1000 MG: 1 INJECTION, POWDER, LYOPHILIZED, FOR SOLUTION INTRAVENOUS at 00:52

## 2023-06-17 RX ADMIN — ROPINIROLE HYDROCHLORIDE 1 MG: 1 TABLET, FILM COATED ORAL at 14:57

## 2023-06-17 RX ADMIN — GABAPENTIN 600 MG: 300 CAPSULE ORAL at 09:20

## 2023-06-17 RX ADMIN — CETIRIZINE HYDROCHLORIDE 10 MG: 10 TABLET, FILM COATED ORAL at 09:21

## 2023-06-17 RX ADMIN — BUDESONIDE 250 MCG: 0.25 INHALANT RESPIRATORY (INHALATION) at 19:42

## 2023-06-17 RX ADMIN — FUROSEMIDE 20 MG: 10 INJECTION, SOLUTION INTRAMUSCULAR; INTRAVENOUS at 09:22

## 2023-06-17 RX ADMIN — IPRATROPIUM BROMIDE AND ALBUTEROL SULFATE 1 DOSE: 2.5; .5 SOLUTION RESPIRATORY (INHALATION) at 19:42

## 2023-06-17 RX ADMIN — GABAPENTIN 600 MG: 300 CAPSULE ORAL at 23:02

## 2023-06-17 RX ADMIN — VANCOMYCIN HYDROCHLORIDE 1000 MG: 1 INJECTION, POWDER, LYOPHILIZED, FOR SOLUTION INTRAVENOUS at 12:22

## 2023-06-17 RX ADMIN — FUROSEMIDE 20 MG: 10 INJECTION, SOLUTION INTRAMUSCULAR; INTRAVENOUS at 18:06

## 2023-06-17 RX ADMIN — BUSPIRONE HYDROCHLORIDE 15 MG: 5 TABLET ORAL at 23:01

## 2023-06-17 RX ADMIN — SERTRALINE 100 MG: 50 TABLET, FILM COATED ORAL at 09:20

## 2023-06-17 RX ADMIN — ARFORMOTEROL TARTRATE 15 MCG: 15 SOLUTION RESPIRATORY (INHALATION) at 07:38

## 2023-06-17 RX ADMIN — SODIUM CHLORIDE, PRESERVATIVE FREE 10 ML: 5 INJECTION INTRAVENOUS at 23:04

## 2023-06-17 RX ADMIN — DOXYCYCLINE 100 MG: 100 INJECTION, POWDER, LYOPHILIZED, FOR SOLUTION INTRAVENOUS at 16:29

## 2023-06-17 RX ADMIN — PRAVASTATIN SODIUM 40 MG: 20 TABLET ORAL at 23:01

## 2023-06-17 RX ADMIN — ACETAMINOPHEN 650 MG: 325 TABLET ORAL at 15:03

## 2023-06-17 RX ADMIN — DOXYCYCLINE 100 MG: 100 INJECTION, POWDER, LYOPHILIZED, FOR SOLUTION INTRAVENOUS at 04:15

## 2023-06-17 RX ADMIN — ACETAMINOPHEN 650 MG: 325 TABLET ORAL at 09:21

## 2023-06-17 RX ADMIN — ENOXAPARIN SODIUM 150 MG: 150 INJECTION SUBCUTANEOUS at 01:02

## 2023-06-17 RX ADMIN — SODIUM CHLORIDE, PRESERVATIVE FREE 10 ML: 5 INJECTION INTRAVENOUS at 09:34

## 2023-06-17 RX ADMIN — ROPINIROLE HYDROCHLORIDE 1 MG: 1 TABLET, FILM COATED ORAL at 23:01

## 2023-06-17 RX ADMIN — IPRATROPIUM BROMIDE AND ALBUTEROL SULFATE 1 DOSE: 2.5; .5 SOLUTION RESPIRATORY (INHALATION) at 14:24

## 2023-06-17 RX ADMIN — BUSPIRONE HYDROCHLORIDE 15 MG: 5 TABLET ORAL at 10:56

## 2023-06-17 RX ADMIN — BUDESONIDE 250 MCG: 0.25 INHALANT RESPIRATORY (INHALATION) at 07:38

## 2023-06-17 RX ADMIN — Medication 10 MG: at 23:02

## 2023-06-17 RX ADMIN — MEROPENEM 1000 MG: 1 INJECTION, POWDER, FOR SOLUTION INTRAVENOUS at 16:29

## 2023-06-17 RX ADMIN — PREDNISONE 10 MG: 10 TABLET ORAL at 09:21

## 2023-06-17 RX ADMIN — MEROPENEM 1000 MG: 1 INJECTION, POWDER, FOR SOLUTION INTRAVENOUS at 00:42

## 2023-06-17 RX ADMIN — MEROPENEM 1000 MG: 1 INJECTION, POWDER, FOR SOLUTION INTRAVENOUS at 09:26

## 2023-06-17 RX ADMIN — ROPINIROLE HYDROCHLORIDE 1 MG: 1 TABLET, FILM COATED ORAL at 09:20

## 2023-06-17 RX ADMIN — ARFORMOTEROL TARTRATE 15 MCG: 15 SOLUTION RESPIRATORY (INHALATION) at 19:42

## 2023-06-17 RX ADMIN — ACETAMINOPHEN 650 MG: 325 TABLET ORAL at 22:19

## 2023-06-17 RX ADMIN — PANTOPRAZOLE SODIUM 40 MG: 40 TABLET, DELAYED RELEASE ORAL at 07:59

## 2023-06-17 ASSESSMENT — PAIN DESCRIPTION - LOCATION
LOCATION: LEG
LOCATION: HEAD
LOCATION: HEAD

## 2023-06-17 ASSESSMENT — PAIN SCALES - GENERAL
PAINLEVEL_OUTOF10: 6
PAINLEVEL_OUTOF10: 1
PAINLEVEL_OUTOF10: 8

## 2023-06-17 ASSESSMENT — PAIN DESCRIPTION - DESCRIPTORS
DESCRIPTORS: CRAMPING
DESCRIPTORS: ACHING

## 2023-06-17 ASSESSMENT — PAIN DESCRIPTION - ORIENTATION: ORIENTATION: RIGHT;LEFT

## 2023-06-18 LAB
ANION GAP SERPL CALC-SCNC: 5 MMOL/L (ref 3–18)
BUN SERPL-MCNC: 19 MG/DL (ref 7–18)
BUN/CREAT SERPL: 30 (ref 12–20)
CALCIUM SERPL-MCNC: 9.1 MG/DL (ref 8.5–10.1)
CHLORIDE SERPL-SCNC: 102 MMOL/L (ref 100–111)
CO2 SERPL-SCNC: 37 MMOL/L (ref 21–32)
CREAT SERPL-MCNC: 0.64 MG/DL (ref 0.6–1.3)
GLUCOSE BLD STRIP.AUTO-MCNC: 105 MG/DL (ref 70–110)
GLUCOSE BLD STRIP.AUTO-MCNC: 106 MG/DL (ref 70–110)
GLUCOSE BLD STRIP.AUTO-MCNC: 112 MG/DL (ref 70–110)
GLUCOSE BLD STRIP.AUTO-MCNC: 113 MG/DL (ref 70–110)
GLUCOSE BLD STRIP.AUTO-MCNC: 131 MG/DL (ref 70–110)
GLUCOSE BLD STRIP.AUTO-MCNC: 97 MG/DL (ref 70–110)
GLUCOSE SERPL-MCNC: 103 MG/DL (ref 74–99)
POTASSIUM SERPL-SCNC: 3.8 MMOL/L (ref 3.5–5.5)
SODIUM SERPL-SCNC: 144 MMOL/L (ref 136–145)

## 2023-06-18 PROCEDURE — 1100000003 HC PRIVATE W/ TELEMETRY

## 2023-06-18 PROCEDURE — 6370000000 HC RX 637 (ALT 250 FOR IP): Performed by: HOSPITALIST

## 2023-06-18 PROCEDURE — 2500000003 HC RX 250 WO HCPCS: Performed by: INTERNAL MEDICINE

## 2023-06-18 PROCEDURE — 6360000002 HC RX W HCPCS: Performed by: INTERNAL MEDICINE

## 2023-06-18 PROCEDURE — 97530 THERAPEUTIC ACTIVITIES: CPT

## 2023-06-18 PROCEDURE — 6370000000 HC RX 637 (ALT 250 FOR IP): Performed by: FAMILY MEDICINE

## 2023-06-18 PROCEDURE — 82962 GLUCOSE BLOOD TEST: CPT

## 2023-06-18 PROCEDURE — 94640 AIRWAY INHALATION TREATMENT: CPT

## 2023-06-18 PROCEDURE — 80048 BASIC METABOLIC PNL TOTAL CA: CPT

## 2023-06-18 PROCEDURE — 6360000002 HC RX W HCPCS: Performed by: HOSPITALIST

## 2023-06-18 PROCEDURE — 6370000000 HC RX 637 (ALT 250 FOR IP): Performed by: INTERNAL MEDICINE

## 2023-06-18 PROCEDURE — 2580000003 HC RX 258: Performed by: INTERNAL MEDICINE

## 2023-06-18 PROCEDURE — 2580000003 HC RX 258: Performed by: HOSPITALIST

## 2023-06-18 PROCEDURE — 36415 COLL VENOUS BLD VENIPUNCTURE: CPT

## 2023-06-18 PROCEDURE — 97116 GAIT TRAINING THERAPY: CPT

## 2023-06-18 RX ORDER — INSULIN LISPRO 100 [IU]/ML
0-8 INJECTION, SOLUTION INTRAVENOUS; SUBCUTANEOUS
Status: DISCONTINUED | OUTPATIENT
Start: 2023-06-18 | End: 2023-06-21 | Stop reason: HOSPADM

## 2023-06-18 RX ADMIN — ENOXAPARIN SODIUM 150 MG: 150 INJECTION SUBCUTANEOUS at 12:28

## 2023-06-18 RX ADMIN — ROPINIROLE HYDROCHLORIDE 1 MG: 1 TABLET, FILM COATED ORAL at 22:49

## 2023-06-18 RX ADMIN — IPRATROPIUM BROMIDE AND ALBUTEROL SULFATE 1 DOSE: 2.5; .5 SOLUTION RESPIRATORY (INHALATION) at 18:57

## 2023-06-18 RX ADMIN — VANCOMYCIN HYDROCHLORIDE 1000 MG: 1 INJECTION, POWDER, LYOPHILIZED, FOR SOLUTION INTRAVENOUS at 00:32

## 2023-06-18 RX ADMIN — DOXYCYCLINE 100 MG: 100 INJECTION, POWDER, LYOPHILIZED, FOR SOLUTION INTRAVENOUS at 04:56

## 2023-06-18 RX ADMIN — GABAPENTIN 600 MG: 300 CAPSULE ORAL at 22:49

## 2023-06-18 RX ADMIN — BUSPIRONE HYDROCHLORIDE 15 MG: 5 TABLET ORAL at 22:49

## 2023-06-18 RX ADMIN — SODIUM CHLORIDE, PRESERVATIVE FREE 10 ML: 5 INJECTION INTRAVENOUS at 08:43

## 2023-06-18 RX ADMIN — FUROSEMIDE 20 MG: 10 INJECTION, SOLUTION INTRAMUSCULAR; INTRAVENOUS at 08:42

## 2023-06-18 RX ADMIN — SODIUM CHLORIDE, PRESERVATIVE FREE 10 ML: 5 INJECTION INTRAVENOUS at 22:51

## 2023-06-18 RX ADMIN — PRAVASTATIN SODIUM 40 MG: 20 TABLET ORAL at 22:49

## 2023-06-18 RX ADMIN — FUROSEMIDE 20 MG: 10 INJECTION, SOLUTION INTRAMUSCULAR; INTRAVENOUS at 17:44

## 2023-06-18 RX ADMIN — MEROPENEM 1000 MG: 1 INJECTION, POWDER, FOR SOLUTION INTRAVENOUS at 08:41

## 2023-06-18 RX ADMIN — ENOXAPARIN SODIUM 150 MG: 150 INJECTION SUBCUTANEOUS at 00:31

## 2023-06-18 RX ADMIN — ROPINIROLE HYDROCHLORIDE 1 MG: 1 TABLET, FILM COATED ORAL at 08:42

## 2023-06-18 RX ADMIN — IPRATROPIUM BROMIDE AND ALBUTEROL SULFATE 1 DOSE: 2.5; .5 SOLUTION RESPIRATORY (INHALATION) at 14:19

## 2023-06-18 RX ADMIN — BUSPIRONE HYDROCHLORIDE 15 MG: 5 TABLET ORAL at 08:42

## 2023-06-18 RX ADMIN — ACETAMINOPHEN 650 MG: 325 TABLET ORAL at 10:51

## 2023-06-18 RX ADMIN — PREDNISONE 10 MG: 10 TABLET ORAL at 08:42

## 2023-06-18 RX ADMIN — ARFORMOTEROL TARTRATE 15 MCG: 15 SOLUTION RESPIRATORY (INHALATION) at 18:57

## 2023-06-18 RX ADMIN — MEROPENEM 1000 MG: 1 INJECTION, POWDER, FOR SOLUTION INTRAVENOUS at 17:43

## 2023-06-18 RX ADMIN — PANTOPRAZOLE SODIUM 40 MG: 40 TABLET, DELAYED RELEASE ORAL at 07:32

## 2023-06-18 RX ADMIN — BUDESONIDE 250 MCG: 0.25 INHALANT RESPIRATORY (INHALATION) at 18:57

## 2023-06-18 RX ADMIN — MEROPENEM 1000 MG: 1 INJECTION, POWDER, FOR SOLUTION INTRAVENOUS at 01:46

## 2023-06-18 RX ADMIN — GABAPENTIN 600 MG: 300 CAPSULE ORAL at 08:42

## 2023-06-18 RX ADMIN — SERTRALINE 100 MG: 50 TABLET, FILM COATED ORAL at 08:42

## 2023-06-18 RX ADMIN — ROPINIROLE HYDROCHLORIDE 1 MG: 1 TABLET, FILM COATED ORAL at 12:28

## 2023-06-18 RX ADMIN — CETIRIZINE HYDROCHLORIDE 10 MG: 10 TABLET, FILM COATED ORAL at 08:42

## 2023-06-18 RX ADMIN — VANCOMYCIN HYDROCHLORIDE 1000 MG: 1 INJECTION, POWDER, LYOPHILIZED, FOR SOLUTION INTRAVENOUS at 12:28

## 2023-06-18 RX ADMIN — Medication 10 MG: at 22:48

## 2023-06-18 RX ADMIN — ACETAMINOPHEN 650 MG: 325 TABLET ORAL at 21:38

## 2023-06-18 ASSESSMENT — PAIN DESCRIPTION - ORIENTATION: ORIENTATION: LEFT;RIGHT

## 2023-06-18 ASSESSMENT — PAIN SCALES - GENERAL
PAINLEVEL_OUTOF10: 6
PAINLEVEL_OUTOF10: 6

## 2023-06-18 ASSESSMENT — PAIN DESCRIPTION - DESCRIPTORS: DESCRIPTORS: SORE

## 2023-06-18 ASSESSMENT — PAIN DESCRIPTION - LOCATION: LOCATION: LEG;ARM

## 2023-06-19 ENCOUNTER — APPOINTMENT (OUTPATIENT)
Facility: HOSPITAL | Age: 73
DRG: 871 | End: 2023-06-19
Payer: MEDICARE

## 2023-06-19 LAB
ANION GAP SERPL CALC-SCNC: 4 MMOL/L (ref 3–18)
BUN SERPL-MCNC: 18 MG/DL (ref 7–18)
BUN/CREAT SERPL: 47 (ref 12–20)
CALCIUM SERPL-MCNC: 8.8 MG/DL (ref 8.5–10.1)
CHLORIDE SERPL-SCNC: 100 MMOL/L (ref 100–111)
CO2 SERPL-SCNC: 38 MMOL/L (ref 21–32)
CREAT SERPL-MCNC: 0.38 MG/DL (ref 0.6–1.3)
GLUCOSE BLD STRIP.AUTO-MCNC: 102 MG/DL (ref 70–110)
GLUCOSE BLD STRIP.AUTO-MCNC: 110 MG/DL (ref 70–110)
GLUCOSE BLD STRIP.AUTO-MCNC: 120 MG/DL (ref 70–110)
GLUCOSE SERPL-MCNC: 103 MG/DL (ref 74–99)
POTASSIUM SERPL-SCNC: 3.3 MMOL/L (ref 3.5–5.5)
SODIUM SERPL-SCNC: 142 MMOL/L (ref 136–145)

## 2023-06-19 PROCEDURE — 6370000000 HC RX 637 (ALT 250 FOR IP): Performed by: INTERNAL MEDICINE

## 2023-06-19 PROCEDURE — 82962 GLUCOSE BLOOD TEST: CPT

## 2023-06-19 PROCEDURE — 97110 THERAPEUTIC EXERCISES: CPT

## 2023-06-19 PROCEDURE — 94640 AIRWAY INHALATION TREATMENT: CPT

## 2023-06-19 PROCEDURE — 2580000003 HC RX 258: Performed by: HOSPITALIST

## 2023-06-19 PROCEDURE — 6370000000 HC RX 637 (ALT 250 FOR IP): Performed by: HOSPITALIST

## 2023-06-19 PROCEDURE — 6370000000 HC RX 637 (ALT 250 FOR IP): Performed by: FAMILY MEDICINE

## 2023-06-19 PROCEDURE — 6360000002 HC RX W HCPCS: Performed by: HOSPITALIST

## 2023-06-19 PROCEDURE — 2700000000 HC OXYGEN THERAPY PER DAY

## 2023-06-19 PROCEDURE — 6360000002 HC RX W HCPCS: Performed by: INTERNAL MEDICINE

## 2023-06-19 PROCEDURE — 1100000003 HC PRIVATE W/ TELEMETRY

## 2023-06-19 PROCEDURE — 97535 SELF CARE MNGMENT TRAINING: CPT

## 2023-06-19 PROCEDURE — 97116 GAIT TRAINING THERAPY: CPT

## 2023-06-19 PROCEDURE — 80048 BASIC METABOLIC PNL TOTAL CA: CPT

## 2023-06-19 PROCEDURE — 71046 X-RAY EXAM CHEST 2 VIEWS: CPT

## 2023-06-19 RX ORDER — POTASSIUM CHLORIDE 20 MEQ/1
40 TABLET, EXTENDED RELEASE ORAL ONCE
Status: COMPLETED | OUTPATIENT
Start: 2023-06-19 | End: 2023-06-19

## 2023-06-19 RX ORDER — PREDNISONE 10 MG/1
10 TABLET ORAL DAILY
Status: COMPLETED | OUTPATIENT
Start: 2023-06-20 | End: 2023-06-20

## 2023-06-19 RX ADMIN — VANCOMYCIN HYDROCHLORIDE 1000 MG: 1 INJECTION, POWDER, LYOPHILIZED, FOR SOLUTION INTRAVENOUS at 11:50

## 2023-06-19 RX ADMIN — ROPINIROLE HYDROCHLORIDE 1 MG: 1 TABLET, FILM COATED ORAL at 20:45

## 2023-06-19 RX ADMIN — SERTRALINE 100 MG: 50 TABLET, FILM COATED ORAL at 09:30

## 2023-06-19 RX ADMIN — SODIUM CHLORIDE, PRESERVATIVE FREE 10 ML: 5 INJECTION INTRAVENOUS at 20:46

## 2023-06-19 RX ADMIN — ENOXAPARIN SODIUM 150 MG: 150 INJECTION SUBCUTANEOUS at 00:25

## 2023-06-19 RX ADMIN — BUDESONIDE 250 MCG: 0.25 INHALANT RESPIRATORY (INHALATION) at 19:55

## 2023-06-19 RX ADMIN — ACETAMINOPHEN 650 MG: 325 TABLET ORAL at 20:42

## 2023-06-19 RX ADMIN — POTASSIUM CHLORIDE 40 MEQ: 1500 TABLET, EXTENDED RELEASE ORAL at 09:30

## 2023-06-19 RX ADMIN — MEROPENEM 1000 MG: 1 INJECTION, POWDER, FOR SOLUTION INTRAVENOUS at 01:28

## 2023-06-19 RX ADMIN — ROPINIROLE HYDROCHLORIDE 1 MG: 1 TABLET, FILM COATED ORAL at 14:04

## 2023-06-19 RX ADMIN — PRAVASTATIN SODIUM 40 MG: 20 TABLET ORAL at 20:43

## 2023-06-19 RX ADMIN — PREDNISONE 10 MG: 10 TABLET ORAL at 09:30

## 2023-06-19 RX ADMIN — BUDESONIDE 250 MCG: 0.25 INHALANT RESPIRATORY (INHALATION) at 07:22

## 2023-06-19 RX ADMIN — GABAPENTIN 600 MG: 300 CAPSULE ORAL at 09:30

## 2023-06-19 RX ADMIN — VANCOMYCIN HYDROCHLORIDE 1000 MG: 1 INJECTION, POWDER, LYOPHILIZED, FOR SOLUTION INTRAVENOUS at 00:22

## 2023-06-19 RX ADMIN — BUSPIRONE HYDROCHLORIDE 15 MG: 5 TABLET ORAL at 09:30

## 2023-06-19 RX ADMIN — IPRATROPIUM BROMIDE AND ALBUTEROL SULFATE 1 DOSE: 2.5; .5 SOLUTION RESPIRATORY (INHALATION) at 14:46

## 2023-06-19 RX ADMIN — IPRATROPIUM BROMIDE AND ALBUTEROL SULFATE 1 DOSE: 2.5; .5 SOLUTION RESPIRATORY (INHALATION) at 19:54

## 2023-06-19 RX ADMIN — BUSPIRONE HYDROCHLORIDE 15 MG: 5 TABLET ORAL at 20:44

## 2023-06-19 RX ADMIN — ENOXAPARIN SODIUM 150 MG: 150 INJECTION SUBCUTANEOUS at 11:50

## 2023-06-19 RX ADMIN — ROPINIROLE HYDROCHLORIDE 1 MG: 1 TABLET, FILM COATED ORAL at 09:30

## 2023-06-19 RX ADMIN — GABAPENTIN 600 MG: 300 CAPSULE ORAL at 20:45

## 2023-06-19 RX ADMIN — IPRATROPIUM BROMIDE AND ALBUTEROL SULFATE 1 DOSE: 2.5; .5 SOLUTION RESPIRATORY (INHALATION) at 07:20

## 2023-06-19 RX ADMIN — CETIRIZINE HYDROCHLORIDE 10 MG: 10 TABLET, FILM COATED ORAL at 09:30

## 2023-06-19 RX ADMIN — Medication 10 MG: at 20:44

## 2023-06-19 RX ADMIN — MEROPENEM 1000 MG: 1 INJECTION, POWDER, FOR SOLUTION INTRAVENOUS at 09:28

## 2023-06-19 RX ADMIN — ARFORMOTEROL TARTRATE 15 MCG: 15 SOLUTION RESPIRATORY (INHALATION) at 07:21

## 2023-06-19 RX ADMIN — MEROPENEM 1000 MG: 1 INJECTION, POWDER, FOR SOLUTION INTRAVENOUS at 16:41

## 2023-06-19 RX ADMIN — ARFORMOTEROL TARTRATE 15 MCG: 15 SOLUTION RESPIRATORY (INHALATION) at 19:55

## 2023-06-19 RX ADMIN — SODIUM CHLORIDE, PRESERVATIVE FREE 10 ML: 5 INJECTION INTRAVENOUS at 10:22

## 2023-06-19 ASSESSMENT — PAIN SCALES - GENERAL: PAINLEVEL_OUTOF10: 2

## 2023-06-19 ASSESSMENT — PAIN - FUNCTIONAL ASSESSMENT: PAIN_FUNCTIONAL_ASSESSMENT: ACTIVITIES ARE NOT PREVENTED

## 2023-06-20 LAB
BACTERIA SPEC CULT: NORMAL
BACTERIA SPEC CULT: NORMAL
GLUCOSE BLD STRIP.AUTO-MCNC: 107 MG/DL (ref 70–110)
GLUCOSE BLD STRIP.AUTO-MCNC: 120 MG/DL (ref 70–110)
GLUCOSE BLD STRIP.AUTO-MCNC: 158 MG/DL (ref 70–110)
GLUCOSE BLD STRIP.AUTO-MCNC: 83 MG/DL (ref 70–110)
GLUCOSE BLD STRIP.AUTO-MCNC: 95 MG/DL (ref 70–110)
SERVICE CMNT-IMP: NORMAL
SERVICE CMNT-IMP: NORMAL

## 2023-06-20 PROCEDURE — 94640 AIRWAY INHALATION TREATMENT: CPT

## 2023-06-20 PROCEDURE — 82962 GLUCOSE BLOOD TEST: CPT

## 2023-06-20 PROCEDURE — 6370000000 HC RX 637 (ALT 250 FOR IP): Performed by: HOSPITALIST

## 2023-06-20 PROCEDURE — 6360000002 HC RX W HCPCS: Performed by: INTERNAL MEDICINE

## 2023-06-20 PROCEDURE — 6370000000 HC RX 637 (ALT 250 FOR IP): Performed by: INTERNAL MEDICINE

## 2023-06-20 PROCEDURE — 2700000000 HC OXYGEN THERAPY PER DAY

## 2023-06-20 PROCEDURE — 1100000003 HC PRIVATE W/ TELEMETRY

## 2023-06-20 PROCEDURE — 2580000003 HC RX 258: Performed by: HOSPITALIST

## 2023-06-20 PROCEDURE — 6360000002 HC RX W HCPCS: Performed by: HOSPITALIST

## 2023-06-20 PROCEDURE — 6370000000 HC RX 637 (ALT 250 FOR IP): Performed by: FAMILY MEDICINE

## 2023-06-20 PROCEDURE — 97535 SELF CARE MNGMENT TRAINING: CPT

## 2023-06-20 RX ADMIN — MEROPENEM 1000 MG: 1 INJECTION, POWDER, FOR SOLUTION INTRAVENOUS at 09:59

## 2023-06-20 RX ADMIN — SODIUM CHLORIDE, PRESERVATIVE FREE 10 ML: 5 INJECTION INTRAVENOUS at 10:01

## 2023-06-20 RX ADMIN — Medication 10 MG: at 21:30

## 2023-06-20 RX ADMIN — ACETAMINOPHEN 650 MG: 325 TABLET ORAL at 21:30

## 2023-06-20 RX ADMIN — ACETAMINOPHEN 650 MG: 325 TABLET ORAL at 13:32

## 2023-06-20 RX ADMIN — IPRATROPIUM BROMIDE AND ALBUTEROL SULFATE 1 DOSE: 2.5; .5 SOLUTION RESPIRATORY (INHALATION) at 20:01

## 2023-06-20 RX ADMIN — MEROPENEM 1000 MG: 1 INJECTION, POWDER, FOR SOLUTION INTRAVENOUS at 00:51

## 2023-06-20 RX ADMIN — GABAPENTIN 600 MG: 300 CAPSULE ORAL at 09:59

## 2023-06-20 RX ADMIN — ENOXAPARIN SODIUM 150 MG: 150 INJECTION SUBCUTANEOUS at 00:00

## 2023-06-20 RX ADMIN — IPRATROPIUM BROMIDE AND ALBUTEROL SULFATE 1 DOSE: 2.5; .5 SOLUTION RESPIRATORY (INHALATION) at 15:02

## 2023-06-20 RX ADMIN — CETIRIZINE HYDROCHLORIDE 10 MG: 10 TABLET, FILM COATED ORAL at 10:00

## 2023-06-20 RX ADMIN — ARFORMOTEROL TARTRATE 15 MCG: 15 SOLUTION RESPIRATORY (INHALATION) at 20:01

## 2023-06-20 RX ADMIN — ENOXAPARIN SODIUM 150 MG: 150 INJECTION SUBCUTANEOUS at 12:37

## 2023-06-20 RX ADMIN — ROPINIROLE HYDROCHLORIDE 1 MG: 1 TABLET, FILM COATED ORAL at 09:59

## 2023-06-20 RX ADMIN — ROPINIROLE HYDROCHLORIDE 1 MG: 1 TABLET, FILM COATED ORAL at 21:30

## 2023-06-20 RX ADMIN — PREDNISONE 10 MG: 10 TABLET ORAL at 09:59

## 2023-06-20 RX ADMIN — SODIUM CHLORIDE, PRESERVATIVE FREE 10 ML: 5 INJECTION INTRAVENOUS at 21:39

## 2023-06-20 RX ADMIN — ARFORMOTEROL TARTRATE 15 MCG: 15 SOLUTION RESPIRATORY (INHALATION) at 07:46

## 2023-06-20 RX ADMIN — IPRATROPIUM BROMIDE AND ALBUTEROL SULFATE 1 DOSE: 2.5; .5 SOLUTION RESPIRATORY (INHALATION) at 07:46

## 2023-06-20 RX ADMIN — BUSPIRONE HYDROCHLORIDE 15 MG: 5 TABLET ORAL at 09:59

## 2023-06-20 RX ADMIN — ROPINIROLE HYDROCHLORIDE 1 MG: 1 TABLET, FILM COATED ORAL at 13:32

## 2023-06-20 RX ADMIN — GABAPENTIN 600 MG: 300 CAPSULE ORAL at 21:30

## 2023-06-20 RX ADMIN — BUDESONIDE 250 MCG: 0.25 INHALANT RESPIRATORY (INHALATION) at 07:46

## 2023-06-20 RX ADMIN — PANTOPRAZOLE SODIUM 40 MG: 40 TABLET, DELAYED RELEASE ORAL at 06:49

## 2023-06-20 RX ADMIN — SERTRALINE 100 MG: 50 TABLET, FILM COATED ORAL at 10:00

## 2023-06-20 RX ADMIN — BUDESONIDE 250 MCG: 0.25 INHALANT RESPIRATORY (INHALATION) at 20:01

## 2023-06-20 ASSESSMENT — PAIN SCALES - GENERAL
PAINLEVEL_OUTOF10: 5
PAINLEVEL_OUTOF10: 2
PAINLEVEL_OUTOF10: 6
PAINLEVEL_OUTOF10: 8

## 2023-06-20 ASSESSMENT — PAIN - FUNCTIONAL ASSESSMENT: PAIN_FUNCTIONAL_ASSESSMENT: ACTIVITIES ARE NOT PREVENTED

## 2023-06-20 ASSESSMENT — PAIN DESCRIPTION - ORIENTATION
ORIENTATION: LEFT
ORIENTATION: LEFT

## 2023-06-20 ASSESSMENT — PAIN DESCRIPTION - LOCATION
LOCATION: NECK
LOCATION: SHOULDER

## 2023-06-20 ASSESSMENT — PAIN DESCRIPTION - DESCRIPTORS
DESCRIPTORS: ACHING
DESCRIPTORS: ACHING

## 2023-06-21 VITALS
BODY MASS INDEX: 51.91 KG/M2 | HEART RATE: 84 BPM | WEIGHT: 293 LBS | RESPIRATION RATE: 18 BRPM | HEIGHT: 63 IN | TEMPERATURE: 97.5 F | DIASTOLIC BLOOD PRESSURE: 66 MMHG | SYSTOLIC BLOOD PRESSURE: 148 MMHG | OXYGEN SATURATION: 96 %

## 2023-06-21 LAB
BACTERIA SPEC CULT: ABNORMAL
GLUCOSE BLD STRIP.AUTO-MCNC: 144 MG/DL (ref 70–110)
GLUCOSE BLD STRIP.AUTO-MCNC: 93 MG/DL (ref 70–110)
GLUCOSE BLD STRIP.AUTO-MCNC: 98 MG/DL (ref 70–110)
GRAM STN SPEC: ABNORMAL
SERVICE CMNT-IMP: ABNORMAL

## 2023-06-21 PROCEDURE — 6360000002 HC RX W HCPCS: Performed by: INTERNAL MEDICINE

## 2023-06-21 PROCEDURE — 6370000000 HC RX 637 (ALT 250 FOR IP): Performed by: FAMILY MEDICINE

## 2023-06-21 PROCEDURE — 94640 AIRWAY INHALATION TREATMENT: CPT

## 2023-06-21 PROCEDURE — 97116 GAIT TRAINING THERAPY: CPT

## 2023-06-21 PROCEDURE — 82962 GLUCOSE BLOOD TEST: CPT

## 2023-06-21 PROCEDURE — 6370000000 HC RX 637 (ALT 250 FOR IP): Performed by: INTERNAL MEDICINE

## 2023-06-21 PROCEDURE — 6370000000 HC RX 637 (ALT 250 FOR IP): Performed by: HOSPITALIST

## 2023-06-21 PROCEDURE — 2580000003 HC RX 258: Performed by: HOSPITALIST

## 2023-06-21 PROCEDURE — 2700000000 HC OXYGEN THERAPY PER DAY

## 2023-06-21 RX ADMIN — NYSTATIN 500000 UNITS: 100000 SUSPENSION ORAL at 13:16

## 2023-06-21 RX ADMIN — ARFORMOTEROL TARTRATE 15 MCG: 15 SOLUTION RESPIRATORY (INHALATION) at 07:52

## 2023-06-21 RX ADMIN — SODIUM CHLORIDE, PRESERVATIVE FREE 10 ML: 5 INJECTION INTRAVENOUS at 07:43

## 2023-06-21 RX ADMIN — CETIRIZINE HYDROCHLORIDE 10 MG: 10 TABLET, FILM COATED ORAL at 07:32

## 2023-06-21 RX ADMIN — IPRATROPIUM BROMIDE AND ALBUTEROL SULFATE 1 DOSE: 2.5; .5 SOLUTION RESPIRATORY (INHALATION) at 07:52

## 2023-06-21 RX ADMIN — GABAPENTIN 600 MG: 300 CAPSULE ORAL at 07:31

## 2023-06-21 RX ADMIN — ACETAMINOPHEN 650 MG: 325 TABLET ORAL at 15:50

## 2023-06-21 RX ADMIN — ENOXAPARIN SODIUM 150 MG: 150 INJECTION SUBCUTANEOUS at 01:58

## 2023-06-21 RX ADMIN — ROPINIROLE HYDROCHLORIDE 1 MG: 1 TABLET, FILM COATED ORAL at 07:31

## 2023-06-21 RX ADMIN — BUSPIRONE HYDROCHLORIDE 15 MG: 5 TABLET ORAL at 07:31

## 2023-06-21 RX ADMIN — BUSPIRONE HYDROCHLORIDE 15 MG: 5 TABLET ORAL at 01:58

## 2023-06-21 RX ADMIN — ROPINIROLE HYDROCHLORIDE 1 MG: 1 TABLET, FILM COATED ORAL at 13:15

## 2023-06-21 RX ADMIN — IPRATROPIUM BROMIDE AND ALBUTEROL SULFATE 1 DOSE: 2.5; .5 SOLUTION RESPIRATORY (INHALATION) at 14:03

## 2023-06-21 RX ADMIN — NYSTATIN 500000 UNITS: 100000 SUSPENSION ORAL at 17:04

## 2023-06-21 RX ADMIN — PRAVASTATIN SODIUM 40 MG: 20 TABLET ORAL at 01:58

## 2023-06-21 RX ADMIN — ACETAMINOPHEN 650 MG: 325 TABLET ORAL at 07:30

## 2023-06-21 RX ADMIN — SERTRALINE 100 MG: 50 TABLET, FILM COATED ORAL at 07:31

## 2023-06-21 RX ADMIN — ENOXAPARIN SODIUM 150 MG: 150 INJECTION SUBCUTANEOUS at 13:15

## 2023-06-21 RX ADMIN — BUDESONIDE 250 MCG: 0.25 INHALANT RESPIRATORY (INHALATION) at 07:52

## 2023-06-21 RX ADMIN — PANTOPRAZOLE SODIUM 40 MG: 40 TABLET, DELAYED RELEASE ORAL at 07:32

## 2023-06-21 ASSESSMENT — PAIN DESCRIPTION - DESCRIPTORS
DESCRIPTORS: ACHING
DESCRIPTORS: ACHING

## 2023-06-21 ASSESSMENT — PAIN SCALES - GENERAL
PAINLEVEL_OUTOF10: 8
PAINLEVEL_OUTOF10: 7
PAINLEVEL_OUTOF10: 0
PAINLEVEL_OUTOF10: 0

## 2023-06-21 ASSESSMENT — PAIN DESCRIPTION - ORIENTATION
ORIENTATION: LEFT
ORIENTATION: RIGHT

## 2023-06-21 ASSESSMENT — PAIN DESCRIPTION - LOCATION
LOCATION: SHOULDER
LOCATION: SHOULDER

## 2023-06-21 NOTE — DISCHARGE SUMMARY
PROVENTIL;VENTOLIN;PROAIR     busPIRone 15 MG tablet  Commonly known as: BUSPAR     enoxaparin 60 MG/0.6ML  Commonly known as: LOVENOX     fexofenadine 180 MG tablet  Commonly known as: ALLEGRA     fluticasone 50 MCG/ACT nasal spray  Commonly known as: FLONASE     guaiFENesin 600 MG extended release tablet  Commonly known as: MUCINEX     lisinopril 2.5 MG tablet  Commonly known as: PRINIVIL;ZESTRIL     montelukast 10 MG tablet  Commonly known as: SINGULAIR     omeprazole 20 MG tablet  Commonly known as: PRILOSEC OTC     Oyster Shell Calcium/D 500-5 MG-MCG Tabs     pravastatin 40 MG tablet  Commonly known as: PRAVACHOL     Risedronate Sodium 150 MG Tabs     rOPINIRole 1 MG tablet  Commonly known as: REQUIP     sertraline 100 MG tablet  Commonly known as: ZOLOFT     Trelegy Ellipta 200-62.5-25 MCG/ACT Aepb inhaler  Generic drug: fluticasone-umeclidin-vilant            STOP taking these medications      budesonide-formoterol 160-4.5 MCG/ACT Aero  Commonly known as: SYMBICORT     furosemide 20 MG tablet  Commonly known as: LASIX     gabapentin 100 MG capsule  Commonly known as: NEURONTIN     glucosamine-chondroitin 500-400 MG Caps               Where to Get Your Medications        These medications were sent to [address] [place] [address] [phone]  [address] [place] [address]      Phone: [phone]   nystatin [id number] UNIT/ML suspension         Procedures : central line placement     Consults: Pulmonary/Critical Care      PHYSICAL EXAM   Visit Vitals  BP (!) 148/66   Pulse 74   Temp 97.5 °F (36.4 °C) (Oral)   Resp 20   Ht 5' 3\" (1.6 m)   Wt (!) 330 lb 8 oz (149.9 kg)   SpO2 95%   BMI 58.55 kg/m²     General: Awake, cooperative, no acute distress    HEENT: NC, Atraumatic. PERRLA, EOMI. Anicteric sclerae. Lungs:  CTA Bilaterally. No Wheezing/Rhonchi/Rales.   Heart:  Regular  rhythm,  No murmur, No Rubs, No Gallops  Abdomen: Soft, Non distended, Non

## 2023-06-21 NOTE — PROGRESS NOTES
[phone] [facility] opale
[address] [facility] Pharmacokinetic Monitoring Service - Vancomycin    Consulting Provider: Dr. Gregory Quinn   Indication: CAP - 7 day Tx  Target Concentration: Goal AUC/TRAY 400-600 mg*hr/L  Day of Therapy: 7/7  Additional Antimicrobials: Meropenem    Pertinent Laboratory Values: Wt Readings from Last 1 Encounters:   06/19/23 (!) 330 lb 8 oz (149.9 kg)     Temp Readings from Last 1 Encounters:   06/19/23 98.5 °F (36.9 °C) (Oral)     Estimated Creatinine Clearance: 199 mL/min (A) (based on SCr of 0.38 mg/dL (L)). Recent Labs     06/18/23  0416 06/19/23  0532   CREATININE 0.64 0.38*       Recent vancomycin administrations                     vancomycin (VANCOCIN) 1,000 mg in sodium chloride 0.9 % 250 mL (vial-mate) IVPB (mg) 1,000 mg New Bag 06/19/23 0022     1,000 mg New Bag 06/18/23 1228    vancomycin (VANCOCIN) 1,000 mg in sodium chloride 0.9 % 250 mL (vial-mate) IVPB (mg) 1,000 mg New Bag 06/18/23 0032     1,000 mg New Bag 06/17/23 1222     1,000 mg New Bag  0052     1,000 mg New Bag 06/16/23 1222                      Plan:  Current dosing regimen is therapeutic  Today is day 7/7 of Vancomycin consult. Pharmacy to sign off at this time. Please re-consult pharmacy to dose Vancomycin should there be a need to continue dosing.     Thank you for the consult,  Santana Bob, [facility], Pharm D  6/19/2023 9:17 AM
Auth approved. [address] [facility] in [place], [place]  Address: [facility] [address], [place], [address] [address] [facility]    Phone: [phone] transport time. Caregiver in agreement with plan to bring belongings to SNF. Personal care to fax [facility] to [place].
Auth still pending for [facility].
DC summary to be placed and transport confirmed for 1700. SNF aware.
GENESIS reached out to  with assist with insurance auth. Patient medically stable for DC awaiting auth. GENESIS reached out to facility an spoke with admissions regarding referral as well. GENESIS obtained number [phone] to [facility] with insurance assist through facility. GENESIS attempted to call number times, no answer, unable to leave message. Patient will need medical transport upon DC.
Hospitalist Progress Note-critical care note     Patient: Brennon Rubio MRN: [id number]  CSN: [id number]    YOB: 1950  Age: 81 y.o. Sex: female    DOA: 6/13/2023 LOS:  LOS: 8 days            Chief complaint: septic shock respiratory failure     Assessment/Plan         Active Hospital Problems    Diagnosis Date Noted    Acute respiratory failure with hypoxia and hypercapnia (HCC) [J96.01, J96.02] 06/13/2023    Pneumonia [J18.9] 06/13/2023    Shock ([address] [R57.9] 06/13/2023    Septic shock ([address] [A41.9, R65.21] 06/13/2023    Community acquired pneumonia of right middle lobe of lung [J18.9] 06/13/2023    BMI 40.0-44.9, adult ([address] [Z68.41] 06/13/2023    Asthma [J45.909] 06/13/2023    Controlled type 2 diabetes mellitus, without long-term current use of insulin ([address] [E11.9] 06/13/2023    Cataract [H26.9] 08/18/2021    Neuropathic pain [M79.2] 01/13/2017    Obesity hypoventilation syndrome ([address] [E66.2] 01/13/2017    S/P gastric bypass [Z98.84]         Pt was admitted for acute respiratory failure and sepsis shock and pna, now ready to be dc to rehab     Acute respirator failure with hypoxia  Off bipap on admission continue weaning off oxygen as tolerated   Covid 19 negative   Can not fit ct  scan due to body habitus   Respiratory cx candida-oral nystatin . ? ecoli rare pseudo completed abx  tx          Asthma ,maybe exacerbation  Breathing tx and steroid on po steroid -completed      Pna continue merrem and vanc -completed 7 days abx           Septic shock like due to pneumonia,  resolved         Factor V Leyden mutation  Lovenox at home for DVT prevention, PVL negative for DVT  Follow H&H, plts. INR. On lovenox full dose       Restless leg syndrome continue home medication        Diabetes type 2, not on medication at home, sliding scale patient is on steroid.     Bmi 59  Need lifestyle modification       Subjective I feel fine, I am ready to go to rehab   Disposition :rehab -hope tomorrow       Review
Hospitalist Progress Note-critical care note     Patient: Christopher Rodriguez MRN: [id number]  CSN: [id number]    YOB: 1950  Age: 81 y.o. Sex: female    DOA: 6/13/2023 LOS:  LOS: 7 days            Chief complaint: septic shock respiratory failure     Assessment/Plan         Active Hospital Problems    Diagnosis Date Noted    Acute respiratory failure with hypoxia and hypercapnia (HCC) [J96.01, J96.02] 06/13/2023    Pneumonia [J18.9] 06/13/2023    Shock ([address] [R57.9] 06/13/2023    Septic shock ([address] [A41.9, R65.21] 06/13/2023    Community acquired pneumonia of right middle lobe of lung [J18.9] 06/13/2023    BMI 40.0-44.9, adult ([address] [Z68.41] 06/13/2023    Asthma [J45.909] 06/13/2023    Controlled type 2 diabetes mellitus, without long-term current use of insulin ([address] [E11.9] 06/13/2023    Cataract [H26.9] 08/18/2021    Neuropathic pain [M79.2] 01/13/2017    Obesity hypoventilation syndrome ([address] [E66.2] 01/13/2017    S/P gastric bypass [Z98.84]           Acute respirator failure with hypoxia  Off bipap on admission continue weaning off oxygen as tolerated   Covid 19 negative   Can not fit ct  scan due to body habitus          Asthma ,maybe exacerbation  Breathing tx and steroid on po steroid -completed      Pna continue merrem and vanc -completed 7 days abx   Respiratory cx         Septic shock like due to pneumonia,  resolved         Factor V Leyden mutation  Lovenox at home for DVT prevention, PVL negative for DVT  Follow H&H, plts. INR. On lovenox full dose     Leukocytosis like due to steroid , improving         Restless leg syndrome continue home medication        Diabetes type 2, not on medication at home, sliding scale patient is on steroid. Bmi 59  Need lifestyle modification       Subjective I am ready to leave   Disposition :rehab -hope tomorrow     Remove central line   Review of systems:    General: No fevers or chills. Cardiovascular: No chest pain or pressure. No palpitations.
Hospitalist Progress Note-critical care note     Patient: Jun Bolanos MRN: [id number]  CSN: [id number]    YOB: 1950  Age: 81 y.o. Sex: female    DOA: 6/13/2023 LOS:  LOS: 6 days            Chief complaint: septic shock respiratory failure     Assessment/Plan         Active Hospital Problems    Diagnosis Date Noted    Acute respiratory failure with hypoxia and hypercapnia (HCC) [J96.01, J96.02] 06/13/2023    Pneumonia [J18.9] 06/13/2023    Shock ([address] [R57.9] 06/13/2023    Septic shock ([address] [A41.9, R65.21] 06/13/2023    Community acquired pneumonia of right middle lobe of lung [J18.9] 06/13/2023    BMI 40.0-44.9, adult [facility]) [Z68.41] 06/13/2023    Asthma [J45.909] 06/13/2023    Controlled type 2 diabetes mellitus, without long-term current use of insulin ([address] [E11.9] 06/13/2023    Cataract [H26.9] 08/18/2021    Neuropathic pain [M79.2] 01/13/2017    Obesity hypoventilation syndrome ([address] [E66.2] 01/13/2017    S/P gastric bypass [Z98.84]           Acute respirator failure with hypoxia  Off bipap still on high flow  o2 at 10 L -on 4 L AM  and continue weaning as tolerated talked with rn   Covid 19 negative   Can not fit ct  scan due to body habitus          Asthma ,maybe exacerbation improving   Breathing tx and steroid on po steroid -last dose will be tomorrow      Pna continue merrem and vanc   Respiratory cx         Septic shock like due to pneumonia,  resolved         Factor V Leyden mutation  Lovenox at home for DVT prevention, PVL negative for DVT  Follow H&H, plts. INR. On lovenox full dose     Leukocytosis like due to steroid , improving today         Restless leg syndrome continue home medication        Diabetes type 2, not on medication at home, sliding scale patient is on steroid.     Bmi 59  Need lifestyle modification       Subjective I feel fine, breathing back to base line, will my insurance pay for the rehab ?    K replacement   Disposition :rehab tomorrow   Review of
Medicare pt has received, reviewed, and signed 2nd IM letter informing them of their right to appeal the discharge. Signed copy has been placed on pt bedside chart.
Physical Therapy Goals:  Initiated 6/19/2023 to be met within 7-10 days. Short Term Goals  Time Frame for Short Term Goals: 7 days  Short Term Goal 1: Patient will perform supine to/from sit with SBA  Short Term Goal 2: Patient will perform sit to/from stand with CGA  Short Term Goal 3: Patient will ambulate 30 ft with RW and CGA  PHYSICAL THERAPY TREATMENT    Patient: Mino Gilbert (89 y.o. female)  Date: 6/19/2023  Diagnosis: Septicemia ([address] [A41.9]  Septic shock ([address] [A41.9, R65.21]  Acute respiratory failure with hypoxia and hypercapnia ([address] [J96.01, J96.02]  Pneumonia of right lung due to infectious organism, unspecified part of lung [J18.9]  Community acquired pneumonia of right middle lobe of lung [J18.9] Septic shock ([address]  Precautions: Fall Risk, Modified Diet    ASSESSMENT:  Patient motivated to participate with PT. Pt ambulated 20 ft with RW and CGA. After sitting rest break and participation in seated there ex pt able to perform second round of 20 ft with RW and CGA. Pt with fatigue. Pt left with all needs met and call bell within reach. Will continue to progress mobility as pt tolerates. Progression toward goals:   []      Improving appropriately and progressing toward goals  []      Improving slowly and progressing toward goals  []      Not making progress toward goals and plan of care will be adjusted     PLAN:  Patient continues to benefit from skilled intervention to address the above impairments. Continue treatment per established plan of care. Further Equipment Recommendations for Discharge: No  Discharge Recommendation: [address]: 15/20    This [facility] score should be considered in conjunction with interdisciplinary team recommendations to determine the most appropriate discharge setting. Patient's social support, diagnosis, medical stability, and prior level of function should also be taken into consideration.      SUBJECTIVE:   Patient stated
Physical Therapy Goals:  Initiated 6/21/2023 to be met within 7-10 days. Short Term Goals  Time Frame for Short Term Goals: 7 days  Short Term Goal 1: Patient will perform supine to/from sit with SBA  Short Term Goal 2: Patient will perform sit to/from stand with CGA  Short Term Goal 3: Patient will ambulate 30 ft with RW and CGA    []  Patient has met MD erica mitchell for d/c home   []  Recommend  with 24 hour adult care   [x]  Benefit from additional acute PT session to address:  rehab placement      PHYSICAL THERAPY TREATMENT    Patient: Breanne Suárez (81 y.o. female)  Date: 6/21/2023  Diagnosis: Septicemia [address] [A41.9]  Septic shock ([facility] [A41.9, R65.21]  Acute respiratory failure with hypoxia and hypercapnia ([facility] [J96.01, J96.02]  Pneumonia of right lung due to infectious organism, unspecified part of lung [J18.9]  Community acquired pneumonia of right middle lobe of lung [J18.9] Septic shock ([address]      Precautions: Fall Risk, Modified Diet,  ,  ,  ,  ,  ,  ,    PLOF:     ASSESSMENT:  Assessment  Assessment: Pt in bed upon arrival.  Heavy use of bed rails to sit up EOB with HOB elevated. Sit to stand performed with bed in lowest position and CGA. Ambulated 20ft x2 with bariatric RW, wide SHANNON, decreased stride, no LOB. Seated rest break inbetween trials. SpO2 ranged between 91-93% with supplemental O2 at rest and during activity. Pt left sitting in recliner. Activity Tolerance: Patient limited by endurance  Discharge Recommendations: Subacute/Skilled Nursing Facility    Progression toward goals:   []      Improving appropriately and progressing toward goals  [x]      Improving slowly and progressing toward goals  []      Not making progress toward goals and plan of care will be adjusted     PLAN:  Patient continues to benefit from skilled intervention to address the above impairments. Continue treatment per established plan of care.     Further Equipment Recommendations for Discharge:
Physical Therapy Treatment Attempt    Chart reviewed. Attempted Physical Therapy Treatment, however, patient unable to be seen due to:  []  Nausea/vomiting  []  Eating  []  Pain  []  Patient too lethargic  []  Off Unit for testing/procedure  []  Dialysis treatment in progress   []  Patient refused, despite encouragement and education. [x]  Other: Patient visibly fatigued upon entering. Pt states she has been up out of bed multiple times and worked with therapy. Reviewed supine there ex which patient reports she has been performing on her own. Will follow up tomorrow to continue mobility progress. Will follow up as patient's schedule allows.    Ryann Lemus, PT, DPT
SW spoke with UR regarding insurance assist. UR does not have insurance number to reach out to pertaining to SNF.
Teodora started at [facility]. GENESIS attempted to reach out to personal care agency regarding [facility] needed, GENESIS LM for agency to return call. Patient asking about how she will her her belongings. Patient will require transport upon DC. SW to follow.
Updated the Medical Advanced Directive. Confirmed with Chaplain Ayers.
associated with a discharge to the patient's home setting. Based on an AM-PAC score of 16/24 and their current ADL deficits; it is recommended that the patient have 3-5 sessions per week of Occupational Therapy at d/c to increase the patient's independence.
brushing teeth? [] 1 [] 2 [x] 3 [] 4   6. Eating meals? [] 1 [] 2 [] 3 [x] 4          Current research shows that an AM-PAC score of 17 or less is not associated with a discharge to the patient's home setting. Based on an AM-PAC score of 17/24 and their current ADL deficits; it is recommended that the patient have 3-5 sessions per week of Occupational Therapy at d/c to increase the patient's independence.

## 2023-06-22 LAB
EKG ATRIAL RATE: 83 BPM
EKG DIAGNOSIS: NORMAL
EKG P AXIS: 56 DEGREES
EKG P-R INTERVAL: 194 MS
EKG Q-T INTERVAL: 376 MS
EKG QRS DURATION: 102 MS
EKG QTC CALCULATION (BAZETT): 441 MS
EKG R AXIS: 12 DEGREES
EKG T AXIS: 62 DEGREES
EKG VENTRICULAR RATE: 83 BPM

## (undated) DEVICE — NEEDLE HYPO 21GA L1.5IN INTRAMUSCULAR S STL LATCH BVL UP

## (undated) DEVICE — Device

## (undated) DEVICE — MAYO STAND COVER: Brand: CONVERTORS

## (undated) DEVICE — BLADE SAW W098XL295IN THK005IN CUT THK0058IN REPL SAG W

## (undated) DEVICE — GLOVE ORANGE PI 7 1/2   MSG9075

## (undated) DEVICE — 3M(TM) TEGADERM(TM) TRANSPARENT FILM DRESSING FRAME STYLE 9505W: Brand: 3M™ TEGADERM™

## (undated) DEVICE — PLUS HANDPIECE WITH SPRAY TIP: Brand: SURGILAV

## (undated) DEVICE — SOLUTION IV 500ML 0.9% SOD CHL FLX CONT

## (undated) DEVICE — BIT DRL KIT SHLDR 2MM/3.2MM -- DISP EQUINOXE

## (undated) DEVICE — (D)SYR 10ML 1/5ML GRAD NSAF -- PKGING CHANGE USE ITEM 338027

## (undated) DEVICE — SUTURE STRATAFIX SZ 3-0 L30CM NONABSORBABLE UD L19MM FS-2 SXMP2B408

## (undated) DEVICE — SUT VCRL + 2-0 36IN CT1 UD --

## (undated) DEVICE — DEVON™ KNEE AND BODY STRAP 60" X 3" (1.5 M X 7.6 CM): Brand: DEVON

## (undated) DEVICE — SOLUTION IRRIGATION BAL SALT SOLUTION 500 ML STRL BSS

## (undated) DEVICE — STRAP,POSITIONING,KNEE/BODY,FOAM,4X60": Brand: MEDLINE

## (undated) DEVICE — TOWEL SURG W16XL26IN BLU NONFENESTRATED DLX ST 2 PER PK

## (undated) DEVICE — REM POLYHESIVE ADULT PATIENT RETURN ELECTRODE: Brand: VALLEYLAB

## (undated) DEVICE — KIT PT CARE SCHLEIN ULT SHLDR POS

## (undated) DEVICE — SYSTEM FLD MGMT DIA0.9MM 45DEG ULT BAL VISN ACT INTREPID

## (undated) DEVICE — IMPLANTABLE DEVICE: Type: IMPLANTABLE DEVICE | Site: SHOULDER | Status: NON-FUNCTIONAL

## (undated) DEVICE — GARMENT,MEDLINE,DVT,INT,CALF,MED, GEN2: Brand: MEDLINE

## (undated) DEVICE — DRESSING FOAM SELF ADH 20X10 CM ABSORBENT MEPILEX BORDER

## (undated) DEVICE — CANN VISCOFLOW FRM 27G 22MM --

## (undated) DEVICE — KIT BNE CEM PREP PLUG BRSH CURET SPNG W/ RESTRIC FOR FEM

## (undated) DEVICE — (D)PREP SKN CHLRAPRP APPL 26ML -- CONVERT TO ITEM 371833

## (undated) DEVICE — SOLUTION IRRIGATION H2O 0797305] ICU MEDICAL INC]

## (undated) DEVICE — SOLUTION LACTATED RINGERS INJECTION USP

## (undated) DEVICE — FEMORAL CANAL TIP

## (undated) DEVICE — PACK PROCEDURE SURG TOT SHLDR CUST

## (undated) DEVICE — CLEARCUT® SLIT KNIFE INTREPID MICRO-COAXIAL SYSTEM 2.4 SB: Brand: CLEARCUT®; INTREPID

## (undated) DEVICE — 3 BONE CEMENT MIXER: Brand: MIXEVAC

## (undated) DEVICE — 3M™ STERI-DRAPE™ U-DRAPE 1015: Brand: STERI-DRAPE™

## (undated) DEVICE — CYTOTOME IRRIG 25GA L16MM ANT CAP BENT

## (undated) DEVICE — SINGLE PORT MANIFOLD: Brand: NEPTUNE 2

## (undated) DEVICE — NEEDLE HYPO 18GA L1IN PNK POLYPR HUB S STL REG BVL STR W/O

## (undated) DEVICE — SUTURE VCRL 0 L27IN ABSRB OS-6 BRAID COAT UD J534H

## (undated) DEVICE — SUTURE FIBERWIRE SZ 2 L38IN WHT BLK L26.5MM 1/2 CIR TAPR AR7205T

## (undated) DEVICE — SOL IRR STRL H2O 1500ML BTL --